# Patient Record
Sex: MALE | Race: OTHER | HISPANIC OR LATINO | URBAN - METROPOLITAN AREA
[De-identification: names, ages, dates, MRNs, and addresses within clinical notes are randomized per-mention and may not be internally consistent; named-entity substitution may affect disease eponyms.]

---

## 2023-12-12 ENCOUNTER — INPATIENT (INPATIENT)
Facility: HOSPITAL | Age: 53
LOS: 9 days | Discharge: HOME | DRG: 287 | End: 2023-12-22
Attending: HOSPITALIST | Admitting: STUDENT IN AN ORGANIZED HEALTH CARE EDUCATION/TRAINING PROGRAM
Payer: MEDICAID

## 2023-12-12 VITALS
OXYGEN SATURATION: 98 % | WEIGHT: 175.27 LBS | HEIGHT: 60 IN | RESPIRATION RATE: 18 BRPM | HEART RATE: 106 BPM | SYSTOLIC BLOOD PRESSURE: 128 MMHG | DIASTOLIC BLOOD PRESSURE: 80 MMHG | TEMPERATURE: 98 F

## 2023-12-12 DIAGNOSIS — I50.9 HEART FAILURE, UNSPECIFIED: ICD-10-CM

## 2023-12-12 LAB
ALBUMIN SERPL ELPH-MCNC: 3.7 G/DL — SIGNIFICANT CHANGE UP (ref 3.3–5.2)
ALBUMIN SERPL ELPH-MCNC: 3.7 G/DL — SIGNIFICANT CHANGE UP (ref 3.3–5.2)
ALP SERPL-CCNC: 106 U/L — SIGNIFICANT CHANGE UP (ref 40–120)
ALP SERPL-CCNC: 106 U/L — SIGNIFICANT CHANGE UP (ref 40–120)
ALT FLD-CCNC: 21 U/L — SIGNIFICANT CHANGE UP
ALT FLD-CCNC: 21 U/L — SIGNIFICANT CHANGE UP
ANION GAP SERPL CALC-SCNC: 13 MMOL/L — SIGNIFICANT CHANGE UP (ref 5–17)
ANION GAP SERPL CALC-SCNC: 13 MMOL/L — SIGNIFICANT CHANGE UP (ref 5–17)
AST SERPL-CCNC: 27 U/L — SIGNIFICANT CHANGE UP
AST SERPL-CCNC: 27 U/L — SIGNIFICANT CHANGE UP
BASOPHILS # BLD AUTO: 0.04 K/UL — SIGNIFICANT CHANGE UP (ref 0–0.2)
BASOPHILS # BLD AUTO: 0.04 K/UL — SIGNIFICANT CHANGE UP (ref 0–0.2)
BASOPHILS NFR BLD AUTO: 0.8 % — SIGNIFICANT CHANGE UP (ref 0–2)
BASOPHILS NFR BLD AUTO: 0.8 % — SIGNIFICANT CHANGE UP (ref 0–2)
BILIRUB SERPL-MCNC: 0.6 MG/DL — SIGNIFICANT CHANGE UP (ref 0.4–2)
BILIRUB SERPL-MCNC: 0.6 MG/DL — SIGNIFICANT CHANGE UP (ref 0.4–2)
BUN SERPL-MCNC: 14.2 MG/DL — SIGNIFICANT CHANGE UP (ref 8–20)
BUN SERPL-MCNC: 14.2 MG/DL — SIGNIFICANT CHANGE UP (ref 8–20)
CALCIUM SERPL-MCNC: 8.5 MG/DL — SIGNIFICANT CHANGE UP (ref 8.4–10.5)
CALCIUM SERPL-MCNC: 8.5 MG/DL — SIGNIFICANT CHANGE UP (ref 8.4–10.5)
CHLORIDE SERPL-SCNC: 103 MMOL/L — SIGNIFICANT CHANGE UP (ref 96–108)
CHLORIDE SERPL-SCNC: 103 MMOL/L — SIGNIFICANT CHANGE UP (ref 96–108)
CO2 SERPL-SCNC: 24 MMOL/L — SIGNIFICANT CHANGE UP (ref 22–29)
CO2 SERPL-SCNC: 24 MMOL/L — SIGNIFICANT CHANGE UP (ref 22–29)
CREAT SERPL-MCNC: 0.89 MG/DL — SIGNIFICANT CHANGE UP (ref 0.5–1.3)
CREAT SERPL-MCNC: 0.89 MG/DL — SIGNIFICANT CHANGE UP (ref 0.5–1.3)
EGFR: 102 ML/MIN/1.73M2 — SIGNIFICANT CHANGE UP
EGFR: 102 ML/MIN/1.73M2 — SIGNIFICANT CHANGE UP
EOSINOPHIL # BLD AUTO: 0.04 K/UL — SIGNIFICANT CHANGE UP (ref 0–0.5)
EOSINOPHIL # BLD AUTO: 0.04 K/UL — SIGNIFICANT CHANGE UP (ref 0–0.5)
EOSINOPHIL NFR BLD AUTO: 0.8 % — SIGNIFICANT CHANGE UP (ref 0–6)
EOSINOPHIL NFR BLD AUTO: 0.8 % — SIGNIFICANT CHANGE UP (ref 0–6)
GLUCOSE SERPL-MCNC: 115 MG/DL — HIGH (ref 70–99)
GLUCOSE SERPL-MCNC: 115 MG/DL — HIGH (ref 70–99)
HCT VFR BLD CALC: 42 % — SIGNIFICANT CHANGE UP (ref 39–50)
HCT VFR BLD CALC: 42 % — SIGNIFICANT CHANGE UP (ref 39–50)
HGB BLD-MCNC: 13.9 G/DL — SIGNIFICANT CHANGE UP (ref 13–17)
HGB BLD-MCNC: 13.9 G/DL — SIGNIFICANT CHANGE UP (ref 13–17)
IMM GRANULOCYTES NFR BLD AUTO: 0.2 % — SIGNIFICANT CHANGE UP (ref 0–0.9)
IMM GRANULOCYTES NFR BLD AUTO: 0.2 % — SIGNIFICANT CHANGE UP (ref 0–0.9)
LYMPHOCYTES # BLD AUTO: 0.98 K/UL — LOW (ref 1–3.3)
LYMPHOCYTES # BLD AUTO: 0.98 K/UL — LOW (ref 1–3.3)
LYMPHOCYTES # BLD AUTO: 18.4 % — SIGNIFICANT CHANGE UP (ref 13–44)
LYMPHOCYTES # BLD AUTO: 18.4 % — SIGNIFICANT CHANGE UP (ref 13–44)
MAGNESIUM SERPL-MCNC: 1.8 MG/DL — SIGNIFICANT CHANGE UP (ref 1.6–2.6)
MAGNESIUM SERPL-MCNC: 1.8 MG/DL — SIGNIFICANT CHANGE UP (ref 1.6–2.6)
MCHC RBC-ENTMCNC: 31.2 PG — SIGNIFICANT CHANGE UP (ref 27–34)
MCHC RBC-ENTMCNC: 31.2 PG — SIGNIFICANT CHANGE UP (ref 27–34)
MCHC RBC-ENTMCNC: 33.1 GM/DL — SIGNIFICANT CHANGE UP (ref 32–36)
MCHC RBC-ENTMCNC: 33.1 GM/DL — SIGNIFICANT CHANGE UP (ref 32–36)
MCV RBC AUTO: 94.4 FL — SIGNIFICANT CHANGE UP (ref 80–100)
MCV RBC AUTO: 94.4 FL — SIGNIFICANT CHANGE UP (ref 80–100)
MONOCYTES # BLD AUTO: 0.51 K/UL — SIGNIFICANT CHANGE UP (ref 0–0.9)
MONOCYTES # BLD AUTO: 0.51 K/UL — SIGNIFICANT CHANGE UP (ref 0–0.9)
MONOCYTES NFR BLD AUTO: 9.6 % — SIGNIFICANT CHANGE UP (ref 2–14)
MONOCYTES NFR BLD AUTO: 9.6 % — SIGNIFICANT CHANGE UP (ref 2–14)
NEUTROPHILS # BLD AUTO: 3.75 K/UL — SIGNIFICANT CHANGE UP (ref 1.8–7.4)
NEUTROPHILS # BLD AUTO: 3.75 K/UL — SIGNIFICANT CHANGE UP (ref 1.8–7.4)
NEUTROPHILS NFR BLD AUTO: 70.2 % — SIGNIFICANT CHANGE UP (ref 43–77)
NEUTROPHILS NFR BLD AUTO: 70.2 % — SIGNIFICANT CHANGE UP (ref 43–77)
NT-PROBNP SERPL-SCNC: 7690 PG/ML — HIGH (ref 0–300)
NT-PROBNP SERPL-SCNC: 7690 PG/ML — HIGH (ref 0–300)
PLATELET # BLD AUTO: 181 K/UL — SIGNIFICANT CHANGE UP (ref 150–400)
PLATELET # BLD AUTO: 181 K/UL — SIGNIFICANT CHANGE UP (ref 150–400)
POTASSIUM SERPL-MCNC: 4.8 MMOL/L — SIGNIFICANT CHANGE UP (ref 3.5–5.3)
POTASSIUM SERPL-MCNC: 4.8 MMOL/L — SIGNIFICANT CHANGE UP (ref 3.5–5.3)
POTASSIUM SERPL-SCNC: 4.8 MMOL/L — SIGNIFICANT CHANGE UP (ref 3.5–5.3)
POTASSIUM SERPL-SCNC: 4.8 MMOL/L — SIGNIFICANT CHANGE UP (ref 3.5–5.3)
PROT SERPL-MCNC: 6.2 G/DL — LOW (ref 6.6–8.7)
PROT SERPL-MCNC: 6.2 G/DL — LOW (ref 6.6–8.7)
RAPID RVP RESULT: DETECTED
RAPID RVP RESULT: DETECTED
RBC # BLD: 4.45 M/UL — SIGNIFICANT CHANGE UP (ref 4.2–5.8)
RBC # BLD: 4.45 M/UL — SIGNIFICANT CHANGE UP (ref 4.2–5.8)
RBC # FLD: 13.3 % — SIGNIFICANT CHANGE UP (ref 10.3–14.5)
RBC # FLD: 13.3 % — SIGNIFICANT CHANGE UP (ref 10.3–14.5)
RSV RNA SPEC QL NAA+PROBE: DETECTED
RSV RNA SPEC QL NAA+PROBE: DETECTED
SARS-COV-2 RNA SPEC QL NAA+PROBE: SIGNIFICANT CHANGE UP
SARS-COV-2 RNA SPEC QL NAA+PROBE: SIGNIFICANT CHANGE UP
SODIUM SERPL-SCNC: 139 MMOL/L — SIGNIFICANT CHANGE UP (ref 135–145)
SODIUM SERPL-SCNC: 139 MMOL/L — SIGNIFICANT CHANGE UP (ref 135–145)
TROPONIN T, HIGH SENSITIVITY RESULT: 37 NG/L — SIGNIFICANT CHANGE UP (ref 0–51)
TROPONIN T, HIGH SENSITIVITY RESULT: 37 NG/L — SIGNIFICANT CHANGE UP (ref 0–51)
TROPONIN T, HIGH SENSITIVITY RESULT: 38 NG/L — SIGNIFICANT CHANGE UP (ref 0–51)
TROPONIN T, HIGH SENSITIVITY RESULT: 38 NG/L — SIGNIFICANT CHANGE UP (ref 0–51)
WBC # BLD: 5.33 K/UL — SIGNIFICANT CHANGE UP (ref 3.8–10.5)
WBC # BLD: 5.33 K/UL — SIGNIFICANT CHANGE UP (ref 3.8–10.5)
WBC # FLD AUTO: 5.33 K/UL — SIGNIFICANT CHANGE UP (ref 3.8–10.5)
WBC # FLD AUTO: 5.33 K/UL — SIGNIFICANT CHANGE UP (ref 3.8–10.5)

## 2023-12-12 PROCEDURE — 99285 EMERGENCY DEPT VISIT HI MDM: CPT

## 2023-12-12 PROCEDURE — 99223 1ST HOSP IP/OBS HIGH 75: CPT

## 2023-12-12 PROCEDURE — 93010 ELECTROCARDIOGRAM REPORT: CPT

## 2023-12-12 PROCEDURE — 71045 X-RAY EXAM CHEST 1 VIEW: CPT | Mod: 26

## 2023-12-12 RX ORDER — FOLIC ACID 0.8 MG
1 TABLET ORAL DAILY
Refills: 0 | Status: DISCONTINUED | OUTPATIENT
Start: 2023-12-12 | End: 2023-12-18

## 2023-12-12 RX ORDER — LANOLIN ALCOHOL/MO/W.PET/CERES
3 CREAM (GRAM) TOPICAL AT BEDTIME
Refills: 0 | Status: DISCONTINUED | OUTPATIENT
Start: 2023-12-12 | End: 2023-12-22

## 2023-12-12 RX ORDER — FUROSEMIDE 40 MG
40 TABLET ORAL ONCE
Refills: 0 | Status: COMPLETED | OUTPATIENT
Start: 2023-12-12 | End: 2023-12-12

## 2023-12-12 RX ORDER — ENOXAPARIN SODIUM 100 MG/ML
40 INJECTION SUBCUTANEOUS EVERY 12 HOURS
Refills: 0 | Status: DISCONTINUED | OUTPATIENT
Start: 2023-12-12 | End: 2023-12-22

## 2023-12-12 RX ORDER — THIAMINE MONONITRATE (VIT B1) 100 MG
100 TABLET ORAL ONCE
Refills: 0 | Status: COMPLETED | OUTPATIENT
Start: 2023-12-12 | End: 2023-12-13

## 2023-12-12 RX ORDER — ACETAMINOPHEN 500 MG
650 TABLET ORAL EVERY 6 HOURS
Refills: 0 | Status: DISCONTINUED | OUTPATIENT
Start: 2023-12-12 | End: 2023-12-22

## 2023-12-12 RX ORDER — ONDANSETRON 8 MG/1
4 TABLET, FILM COATED ORAL EVERY 8 HOURS
Refills: 0 | Status: DISCONTINUED | OUTPATIENT
Start: 2023-12-12 | End: 2023-12-22

## 2023-12-12 RX ADMIN — Medication 40 MILLIGRAM(S): at 19:23

## 2023-12-12 NOTE — H&P ADULT - ATTENDING COMMENTS
53/M, alcohol use disorder (2-3 rum shots)/ night, last drink 12/10, no other significant pmhx presented to the ED for complaints of dyspnea on exertion which began approximately 1 week ago. BNP elevated, but no JVD, no b/l LE edema, no crackles noted on exam. RSV positive. Admitted for exertional dyspnea 2/2 to RSV versus new onset CHF. ECHO ordered, telemetry monitoring. Pt also chronic ETOH user. Started on CIWA with taper and symptomatic benzo administration.

## 2023-12-12 NOTE — ED ADULT NURSE NOTE - OBJECTIVE STATEMENT
Aox4. Pt presenting to Emergency Department complaining of SOB w/ excretion x 8 days. Pt reports coming to Emergency Department today due to increased SOB at work. Pt observed to be tachypneic. Pt placed on cardiac monitor in sinus tach w/ PVCs 101 HR and  99% RA. Respirations even and unlabored. Skin warm to touch.

## 2023-12-12 NOTE — ED ADULT TRIAGE NOTE - CHIEF COMPLAINT QUOTE
pt states " he has difficulty breathing", pt was at work  A&Ox3, resp wnl, pt having tremors, states he drinks on weekends but drank last night

## 2023-12-12 NOTE — ED PROVIDER NOTE - OBJECTIVE STATEMENT
52 y/o male no relevant PMH Alcohol abuse comes to ED c/o  BARCENAS started 8 days ago, he drinks about 3 rum shots every day with dinner. 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 Pt denies fever, chest pain, nausea, vomits, abdominal pain, headache, UTI symptoms or leg edema. Has dry cough for 2 days. Crackles b/l lower lung bases.

## 2023-12-12 NOTE — CONSULT NOTE ADULT - SUBJECTIVE AND OBJECTIVE BOX
[facility]                                              CARDIOLOGY AT [place] [address]                                             Telephone: [phone]. Fax:[phone]      CARDIOLOGY CONSULTATION NOTE                                                                                             History obtained by: Patient and medical record  Community Cardiologist: No   obtained:  No   Reason for Consultation: CHF    Chief complaint:   Patient is a 53y old  Male who presents with a chief complaint of BARCENAS.    HPI: 52 y/o obese male with PMHx of alcohol abuse (2 shots of tequila daily) presented to ED c/o  BARCENAS and PND x  8 days ago. 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 Pt also co cough since yesterday. Pt denies fever, chest pain, nausea, vomits, abdominal pain, headache, or UTI.     CARDIAC TESTING     PAST MEDICAL HISTORY  Alcohol abuse    PAST SURGICAL HISTORY  Denies    SOCIAL HISTORY: + 2 shots of tequila daily. + former smoker (quit 12 years ago). Denies drugs    FAMILY HISTORY:  Denies    Family History of Cardiovascular Disease:  No   Coronary Artery Disease in first degree relative: No   Sudden Cardiac Death in First degree relative: No     HOME MEDICATIONS:   None    CURRENT OTHER MEDICATIONS:   acetaminophen     Tablet .. 650 milliGRAM(s) Oral every 6 hours PRN Temp greater or equal to 38C (100.4F), Mild Pain (1 - 3)  chlordiazePOXIDE   Oral   chlordiazePOXIDE 50 milliGRAM(s) Oral every 6 hours, Stop order after: 4 Doses  LORazepam   Injectable 2 milliGRAM(s) IV Push every 1 hour PRN Symptom-triggered: each CIWA -Ar score 8 or GREATER  melatonin 3 milliGRAM(s) Oral at bedtime PRN Insomnia  ondansetron Injectable 4 milliGRAM(s) IV Push every 8 hours PRN Nausea and/or Vomiting  aluminum hydroxide/magnesium hydroxide/simethicone Suspension 30 milliLiter(s) Oral every 4 hours PRN Dyspepsia  enoxaparin Injectable 40 milliGRAM(s) SubCutaneous every 12 hours  folic acid 1 milliGRAM(s) Oral daily  multivitamin 1 Tablet(s) Oral daily  thiamine Injectable 100 milliGRAM(s) IntraMuscular once, Stop order after: 1 Doses    ALLERGIES:   No Known Allergies    REVIEW OF SYMPTOMS:   CONSTITUTIONAL: No fever, no chills, no weight loss, no weight gain, no fatigue   ENMT:  No vertigo; No sinus or throat pain  NECK: No pain or stiffness  CARDIOVASCULAR: No chest pain, + BARCENAS, no syncope/presyncope, no fatigue, no palpitations, no dizziness, no Orthopnea, + Paroxsymal nocturnal dyspnea  RESPIRATORY: + BARCENAS, + cough  : No dysuria, no hematuria   GI: no nausea, no diarrhea, no constipation, no abdominal pain  NEURO: No headache, no slurred speech   MUSCULOSKELETAL: No joint pain or swelling; No muscle, back, or extremity pain  PSYCH: No agitation, no anxiety.    ALL OTHER REVIEW OF SYSTEMS ARE NEGATIVE.    VITAL SIGNS:   T(C): 36.6 (12-12-23 @ 14:13), Max: 36.6 (12-12-23 @ 14:13)  T(F): 97.9 (12-12-23 @ 14:13), Max: 97.9 (12-12-23 @ 14:13)  HR: 106 (12-12-23 @ 14:13) (106 - 106)  BP: 128/80 (12-12-23 @ 14:13) (128/80 - 128/80)  RR: 18 (12-12-23 @ 14:13) (18 - 18)  SpO2: 98% (12-12-23 @ 14:13) (98% - 98%)    PHYSICAL EXAM:   Constitutional: Comfortable . No acute distress.   HEENT: Atraumatic and normocephalic , neck is supple . no JVD.   CNS: A&Ox3. No focal deficits.   Respiratory:  unlabored. + wheezing b/l, No rhonchi. No crackles   Cardiovascular: RRR normal s1 s2. No murmur. No rubs or gallop.  Gastrointestinal: Soft, non-tender. +Bowel sounds.   Extremities: 2+ Peripheral Pulses, + 1 pitting edema sp Lasix x 1  Psychiatric: Calm . no agitation.   Skin: Warm and dry    LABS:                        13.9   5.33  )-----------( 181      ( 12 Dec 2023 17:19 )             42.0     12-12    139  |  103  |  14.2  ----------------------------<  115<H>  4.8   |  24.0  |  0.89    Ca    8.5      12 Dec 2023 17:19  Mg     1.8     12-12    TPro  6.2<L>  /  Alb  3.7  /  TBili  0.6  /  DBili  x   /  AST  27  /  ALT  21  /  AlkPhos  106  12-12      ECG: Sinus tachycardia with PVCs. RBBB. EKG poor quality. New stat EKG ordered   Prior ECG: No     RADIOLOGY & ADDITIONAL STUDIES:       Preliminary evaluation, please await official recommendations     Assessment and recommendations are final when note is signed by the attending.                                      [facility]                                              CARDIOLOGY AT [place] [address]                                             Telephone: [phone]. Fax:[phone]      CARDIOLOGY CONSULTATION NOTE                                                                                             History obtained by: Patient and medical record  Community Cardiologist: No   obtained:  No   Reason for Consultation: CHF    Chief complaint:   Patient is a 53y old  Male who presents with a chief complaint of BARCENAS.    HPI: 54 y/o obese male with PMHx of alcohol abuse (2 shots of tequila daily) presented to ED c/o  BARCENAS and PND x  8 days ago. 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 Pt also co cough since yesterday. Pt denies fever, chest pain, nausea, vomits, abdominal pain, headache, or UTI.     CARDIAC TESTING     PAST MEDICAL HISTORY  Alcohol abuse    PAST SURGICAL HISTORY  Denies    SOCIAL HISTORY: + 2 shots of tequila daily. + former smoker (quit 12 years ago). Denies drugs    FAMILY HISTORY:  Denies    Family History of Cardiovascular Disease:  No   Coronary Artery Disease in first degree relative: No   Sudden Cardiac Death in First degree relative: No     HOME MEDICATIONS:   None    CURRENT OTHER MEDICATIONS:   acetaminophen     Tablet .. 650 milliGRAM(s) Oral every 6 hours PRN Temp greater or equal to 38C (100.4F), Mild Pain (1 - 3)  chlordiazePOXIDE   Oral   chlordiazePOXIDE 50 milliGRAM(s) Oral every 6 hours, Stop order after: 4 Doses  LORazepam   Injectable 2 milliGRAM(s) IV Push every 1 hour PRN Symptom-triggered: each CIWA -Ar score 8 or GREATER  melatonin 3 milliGRAM(s) Oral at bedtime PRN Insomnia  ondansetron Injectable 4 milliGRAM(s) IV Push every 8 hours PRN Nausea and/or Vomiting  aluminum hydroxide/magnesium hydroxide/simethicone Suspension 30 milliLiter(s) Oral every 4 hours PRN Dyspepsia  enoxaparin Injectable 40 milliGRAM(s) SubCutaneous every 12 hours  folic acid 1 milliGRAM(s) Oral daily  multivitamin 1 Tablet(s) Oral daily  thiamine Injectable 100 milliGRAM(s) IntraMuscular once, Stop order after: 1 Doses    ALLERGIES:   No Known Allergies    REVIEW OF SYMPTOMS:   CONSTITUTIONAL: No fever, no chills, no weight loss, no weight gain, no fatigue   ENMT:  No vertigo; No sinus or throat pain  NECK: No pain or stiffness  CARDIOVASCULAR: No chest pain, + BARCENAS, no syncope/presyncope, no fatigue, no palpitations, no dizziness, no Orthopnea, + Paroxsymal nocturnal dyspnea  RESPIRATORY: + BARCENAS, + cough  : No dysuria, no hematuria   GI: no nausea, no diarrhea, no constipation, no abdominal pain  NEURO: No headache, no slurred speech   MUSCULOSKELETAL: No joint pain or swelling; No muscle, back, or extremity pain  PSYCH: No agitation, no anxiety.    ALL OTHER REVIEW OF SYSTEMS ARE NEGATIVE.    VITAL SIGNS:   T(C): 36.6 (12-12-23 @ 14:13), Max: 36.6 (12-12-23 @ 14:13)  T(F): 97.9 (12-12-23 @ 14:13), Max: 97.9 (12-12-23 @ 14:13)  HR: 106 (12-12-23 @ 14:13) (106 - 106)  BP: 128/80 (12-12-23 @ 14:13) (128/80 - 128/80)  RR: 18 (12-12-23 @ 14:13) (18 - 18)  SpO2: 98% (12-12-23 @ 14:13) (98% - 98%)    PHYSICAL EXAM:   Constitutional: Comfortable . No acute distress.   HEENT: Atraumatic and normocephalic , neck is supple . no JVD.   CNS: A&Ox3. No focal deficits.   Respiratory:  unlabored. + wheezing b/l, No rhonchi. No crackles   Cardiovascular: RRR normal s1 s2. No murmur. No rubs or gallop.  Gastrointestinal: Soft, non-tender. +Bowel sounds.   Extremities: 2+ Peripheral Pulses, + 1 pitting edema sp Lasix x 1  Psychiatric: Calm . no agitation.   Skin: Warm and dry    LABS:                        13.9   5.33  )-----------( 181      ( 12 Dec 2023 17:19 )             42.0     12-12    139  |  103  |  14.2  ----------------------------<  115<H>  4.8   |  24.0  |  0.89    Ca    8.5      12 Dec 2023 17:19  Mg     1.8     12-12    TPro  6.2<L>  /  Alb  3.7  /  TBili  0.6  /  DBili  x   /  AST  27  /  ALT  21  /  AlkPhos  106  12-12      ECG: Sinus tachycardia with PVCs. RBBB. EKG poor quality. New stat EKG ordered   Prior ECG: No     RADIOLOGY & ADDITIONAL STUDIES:       Preliminary evaluation, please await official recommendations     Assessment and recommendations are final when note is signed by the attending.

## 2023-12-12 NOTE — H&P ADULT - NSHPPHYSICALEXAM_GEN_ALL_CORE
T(C): 36.9 (12-12-23 @ 23:52), Max: 36.9 (12-12-23 @ 23:52)  HR: 86 (12-12-23 @ 23:52) (86 - 106)  BP: 119/84 (12-12-23 @ 23:52) (119/84 - 128/80)  RR: 18 (12-12-23 @ 23:52) (18 - 18)  SpO2: 94% (12-12-23 @ 23:52) (94% - 98%)    CONSTITUTIONAL: No apparent distress  EYES: PERRLA and symmetric,  No conjunctival or scleral injection, non-icteric  ENMT: Oral mucosa with moist membranes. Normal dentition; no pharyngeal injection or exudates  NECK: Supple, symmetric and without tracheal deviation   RESP: No respiratory distress, no use of accessory muscles; CTA b/l rhonchi  CV: RRR, +S1S2, no JVD; no peripheral edema  GI: Soft, NT, ND, no rebound, no guarding; no palpable masses  MSK: Normal gait  SKIN: Healed scar over L. thigh  NEURO: CN II-XII intact; normal power, sensation intact in upper and lower extremities b/l to light touch   PSYCH: Appropriate insight/judgment; A+O x 3, mood and affect appropriate, recent/remote memory intact

## 2023-12-12 NOTE — H&P ADULT - HISTORY OF PRESENT ILLNESS
53/M, alcohol use disorder 53/M, alcohol use disorder (2-3 rum shots)/ night, last drink 12/10, no other significant pmhx presented to the ED for complaints of dyspnea on exertion which began approximately 1 week ago. Reports that he started noticing some dyspnea around a week back which progressively worsened in the past 2-3 days and was associated with cough. Reports some vague pain in the LUQ below his ribs without any hx of inciting injury or relation to food intake which began appx the same time. Pain is sporadic and is apparently better today. On direct questioning denies orthopnea, chest pain, fever or chills, leg swelling or dizziness.  53/M, alcohol use disorder (2-3 rum shots)/ night, last drink 12/10, no other significant pmhx presented to the ED for complaints of dyspnea on exertion which began approximately 1 week ago. Reports that he started noticing some dyspnea around a week back which progressively worsened in the past 2-3 days and was associated with cough. Reports some vague pain in the RUQ below his ribs without any hx of inciting injury or relation to food intake which began appx the same time. Pain is sporadic and is apparently better today. On direct questioning denies orthopnea, chest pain, fever or chills, leg swelling or dizziness.

## 2023-12-12 NOTE — ED PROVIDER NOTE - PROGRESS NOTE DETAILS
BNP increased, started pt on Lasix 40mg -> admit for new onset CHF, cardiology consult pending, Echo pending.

## 2023-12-12 NOTE — ED PROVIDER NOTE - NS ED ROS FT
Gen: No fever,tired  ENT: No congestion, no rhinorrhea  Resp: dry cough, SOB  Cardiovascular: No chest pain, no palpitation. + BARCENAS  Gastrointestinal: No nausea, no vomiting, no diarrhea  :  No change in urine output; no dysuria, no hematuria  MS: No joint or muscle pain  Skin: No rashes  Neuro: No headache; no abnormal movements  Remainder negative, except as per the HPI

## 2023-12-12 NOTE — ED PROVIDER NOTE - CLINICAL SUMMARY MEDICAL DECISION MAKING FREE TEXT BOX
54 y/o male no relevant PMH Alcohol abuse comes to ED c/o  BARCENAS started 8 days ago, he drinks about 3 rum shots every day with dinner. 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 Pt denies fever, chest pain, nausea, vomits, abdominal pain, headache, UTI symptoms or leg edema. Has dry cough for 2 days.     Crackles b/l lower lung bases.     A/P   1. LAbs  2. Echo  3. Trop + EKG  4, Xray  5. Cardiology consult and tx pending results 52 y/o male no relevant PMH Alcohol abuse comes to ED c/o  BARCENAS started 8 days ago, he drinks about 3 rum shots every day with dinner. 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 Pt denies fever, chest pain, nausea, vomits, abdominal pain, headache, UTI symptoms or leg edema. Has dry cough for 2 days.     Crackles b/l lower lung bases.     A/P   1. LAbs -> elevated bnp -> lasix  2. Echo  3. Trop + EKG  4, Xray  5. Cardiology consult and tx pending results    Dispo admit 54 y/o male no relevant PMH Alcohol abuse comes to ED c/o  BARCENAS started 8 days ago, he drinks about 3 rum shots every day with dinner. 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 Pt denies fever, chest pain, nausea, vomits, abdominal pain, headache, UTI symptoms or leg edema. Has dry cough for 2 days.     Crackles b/l lower lung bases.     A/P   1. LAbs -> elevated bnp -> lasix  2. Echo  3. Trop + EKG  4, Xray  5. Cardiology consult and tx pending results    Dispo admit

## 2023-12-12 NOTE — CONSULT NOTE ADULT - PROBLEM SELECTOR RECOMMENDATION 9
Pt obese (BMI 47) with PMHx of alcohol abuse (2 shots of tequila daily) presented to ED c/o  BARCENAS and PND x  8 days ago.   Pt volume overload on exam with elevated p-BNP  (7690)  ECG: Sinus tachycardia with PVCs. RBBB. EKG poor quality. New stat EKG ordered. No prior EKG to compare   Trop x 2 negative (38-37)  Telemetry  Trend p-BNP   Start Lasix 40 mg IVP BID. Monitor kidney fx  O2 NC PRN   Maintain K+>4 and mag >2  Trend trops x 3. Serial EKGs  Obtain A1C, lipid panel fasting, TFTs, sed rate to ro comorbidities   DASH diet  Daily weight monitor. Strict I&Os Monitor  Obtain TTE in AM to assess functional and structural status  Elevate swollen leg  Venous Doppler lower extremities r/o DVT  Avoid alcohol   Encourage daily exercise as tolerated and optimal weight management    Further recommendations base on above findings New onset CHF  Pt obese (BMI 47) with PMHx of alcohol abuse (2 shots of tequila daily) presented to ED c/o  BARCENAS and PND x 8 days.  Pt volume overload on exam with elevated p-BNP  (7690)  ECG: Sinus tachycardia with PVCs. RBBB. EKG poor quality. New stat EKG ordered. No prior EKG to compare   Trop x 2 negative (38-37)  Telemetry  Trend p-BNP   Start Lasix 40 mg IVP BID. Monitor kidney fx  O2 NC PRN   Maintain K+>4 and mag >2  Trend trops x 3. Serial EKGs  Obtain A1C, lipid panel fasting, TFTs, sed rate to ro comorbidities   DASH diet. Daily weight monitor. Strict I&Os Monitor  Obtain TTE in AM to assess functional and structural status  Elevate swollen leg  Venous Doppler lower extremities r/o DVT  Avoid alcohol   Encourage daily exercise as tolerated and optimal weight management    Further recommendations base on above findings New onset CHF  Pt obese (BMI 47) with PMHx of alcohol abuse (2 shots of tequila daily) presented to ED c/o  BARCENAS and PND x 8 days.  Pt volume overload on exam with elevated p-BNP  (7690)  ECG: Sinus tachycardia with PVCs. RBBB. EKG poor quality. New stat EKG ordered. No prior EKG to compare   Trop x 2 negative (38-37)  Telemetry  Trend p-BNP   Start Lasix 40 mg IVP BID. Monitor kidney fx  O2 NC PRN   Maintain K+>4 and mag >2  Trend trops x 3. Serial EKGs  Obtain A1C, lipid panel fasting, TFTs, sed rate to ro comorbidities   DASH diet. Daily weight monitor. Strict I&Os Monitor  Obtain TTE in AM to assess functional and structural status  Elevate swollen leg  Venous Doppler lower extremities r/o DVT  Avoid alcohol   Encourage daily exercise as tolerated and optimal weight management    Further recommendations base on above findings. Case discussed with Dr Díaz

## 2023-12-12 NOTE — H&P ADULT - NSHPLABSRESULTS_GEN_ALL_CORE
Troponin T, High Sensitivity (12.12.23 @ 19:22) Respiratory Viral Panel with COVID-19 by BILL (12.12.23 @ 19:22)   Rapid RVP Result: Detected  SARS-CoV-2: NotDetec: This Respiratory Panel uses polymerase chain reaction (PCR) to detect for   adenovirus; coronavirus (HKU1, NL63, 229E, OC43); human metapneumovirus   (hMPV); human enterovirus/rhinovirus (Entero/RV); influenza A; influenza   A/H1; influenza A/H3; influenza A/H1-2009; influenza B; parainfluenza   viruses 1, 2, 3, 4; respiratory syncytial virus; Mycoplasma pneumoniae;   Chlamydophila pneumoniae; and SARS-CoV-2.  Resp Syncytial Virus (RapRVP): DetectedPro-Brain Natriuretic Peptide (12.12.23 @ 17:19)   Pro-Brain Natriuretic Peptide: 7690: NT-proBNP Interpretive comments: Complete Blood Count + Automated Diff (12.12.23 @ 17:19)   WBC Count: 5.33 K/uL  RBC Count: 4.45 M/uL  Hemoglobin: 13.9 g/dL  Hematocrit: 42.0 %  Mean Cell Volume: 94.4 fl  Mean Cell Hemoglobin: 31.2 pg  Mean Cell Hemoglobin Conc: 33.1 gm/dL  Red Cell Distrib Width: 13.3 %  Platelet Count - Automated: 181 K/uL  Comprehensive Metabolic Panel (12.12.23 @ 17:19)   Sodium: 139 mmol/L  Potassium: 4.8 mmol/L  Chloride: 103: Chloride reference range changed from ..10/26/2022 mmol/L  Carbon Dioxide: 24.0 mmol/L  Anion Gap: 13 mmol/L  Blood Urea Nitrogen: 14.2 mg/dL  Creatinine: 0.89 mg/dL  Glucose: 115 mg/dL  Calcium: 8.5 mg/dL  Protein Total: 6.2 g/dL  Albumin: 3.7 g/dL  Bilirubin Total: 0.6 mg/dL  Alkaline Phosphatase: 106 U/L  Aspartate Aminotransferase (AST/SGOT): 27 U/L  Alanine Aminotransferase (ALT/SGPT): 21 U/L

## 2023-12-12 NOTE — H&P ADULT - ASSESSMENT
53/M, alcohol use disorder (2-3 rum shots)/ night, last drink 12/10, no other significant pmhx presented to the ED for complaints of dyspnea on exertion which began approximately 1 week ago. RVP- RSV +    Acute Onset Dyspnea on Exertion  - No respiratory distress, saturating well on RA   - Viral LRTI vs Acute onset CHF   - RSV +ve on RVP  - Pro BNP in 7000's, no orthopnea, pedal edema or other clinical features   - S/p 40mg IVP Lasix in ED   - Continue  - Cxray with some questionable congestion, final read awaited  - CBC, CMP, Trop T WNL   - TTE ordered  - Cardiology consulted     Alcohol use disorder  - Last day 12/10  - CIWA protocol initiated   - On Librium taper  - Resources offered for detox    LUQ pain  - Sporadic in nature  - CMP WNL   - USG LUQ     VTE prophylaxis- Lovenox  Diet- Regular     53/M, alcohol use disorder (2-3 rum shots)/ night, last drink 12/10, no other significant pmhx presented to the ED for complaints of dyspnea on exertion which began approximately 1 week ago. RVP- RSV +    Acute Onset Dyspnea on Exertion  - No respiratory distress, saturating well on RA   - Viral LRTI vs Acute onset CHF   - RSV +ve on RVP  - Pro BNP in 7000's, no orthopnea, pedal edema or other clinical features   - S/p 40mg IVP Lasix in ED   - Continue  - Cxray with some questionable congestion, final read awaited  - CBC, CMP, Trop T WNL   - TTE ordered  - Cardiology consulted     Alcohol use disorder  - Last day 12/10  - CIWA protocol initiated   - On Librium taper  - IM thiamine 100mg x1  - Folic acid, MVI   - Resources offered for detox    LUQ pain  - Sporadic in nature  - CMP WNL   - USG LUQ     VTE prophylaxis- Lovenox  Diet- Regular     53/M, alcohol use disorder (2-3 rum shots)/ night, last drink 12/10, no other significant pmhx presented to the ED for complaints of dyspnea on exertion which began approximately 1 week ago. RVP- RSV +    Acute Onset Dyspnea on Exertion  - No respiratory distress, saturating well on RA   - Viral LRTI vs Acute onset CHF   - RSV +ve on RVP  - Pro BNP in 7000's, no orthopnea, pedal edema or other clinical features    - S/p 40mg IVP Lasix x1 in ED   - Therapy with Lasix held as pt appears to be euvolemic  - Cxray with some questionable congestion, final read awaited  - CBC, CMP, Trop T WNL   - TTE ordered  - Cardiology consulted   - Follow AM labs    Alcohol use disorder  - Last day of alcohol use 12/10  - CIWA protocol initiated   - On Librium taper, with Ativan PRN  - IM thiamine 100mg x1  - IV Thiamine 100mg q24x 3 days ordered  - Folic acid, MVI   - Resources offered for detox    RUQ pain  - Sporadic in nature  - CMP WNL   - USG RUQ to r/o fatty liver given alcohol use and overweight status    VTE prophylaxis- Lovenox  Diet- Regular

## 2023-12-12 NOTE — ED PROVIDER NOTE - ATTENDING CONTRIBUTION TO CARE
I personally saw the patient with the resident, and completed the key components of the history and physical exam. I then discussed the management plan with the resident.    52 y/o M with no PMH, but daily EtOH use presents for BARCENAS x 8 days. Denies chest pain, nausea, vomiting.    PE - mild respiratory distress, tachypneic, bibasilar rales, RRR, abd soft NT/ND, 2+ LE edema, 2+ symmetrical pulses    Patient with new onset CHF - will diurese, labs, CXR, cardiology consultation, requires admission.

## 2023-12-12 NOTE — H&P ADULT - NSHPREVIEWOFSYSTEMS_GEN_ALL_CORE
REVIEW OF SYSTEMS  CONSTITUTIONAL: No weakness  EYES/ENT: No visual changes;  No throat pain   NECK: No pain or stiffness  RESPIRATORY: +Cough, wheezing; +Shortness of breath  CARDIOVASCULAR: No chest pain or palpitations  GASTROINTESTINAL: +LUQ abdominal  pain. No nausea, vomiting, or hematemesis, diarrhea or constipation  GENITOURINARY: No dysuria, frequency or hematuria  NEUROLOGICAL: No stroke like symptoms  SKIN: No rashes REVIEW OF SYSTEMS  CONSTITUTIONAL: No weakness  EYES/ENT: No visual changes;  No throat pain   NECK: No pain or stiffness  RESPIRATORY: +Cough, wheezing; +Shortness of breath  CARDIOVASCULAR: No chest pain or palpitations  GASTROINTESTINAL: +RUQ abdominal  pain. No nausea, vomiting, or hematemesis, diarrhea or constipation  GENITOURINARY: No dysuria, frequency or hematuria  NEUROLOGICAL: No stroke like symptoms  SKIN: No rashes

## 2023-12-12 NOTE — CONSULT NOTE ADULT - NS ATTEND AMEND GEN_ALL_CORE FT
patient seen and examined on 12/13/23, [place]  [id number]    53M Azeri-speaking history significant for chronic alcohol abuse >30yrs, last drank 2 days ago, works as , only family is a sister here, presents with worsening dyspnea/orthopnea for few days, lab work significant for pBNP 7600s and +RVP, EKG with RBBB + LAFB, Troponin negative, admitted 12/12/23 for ADHF with IV Lasix 40mg BID and on CIWA protocol for alcohol withdrawal  -patient AAOx3 conversive with  phone, exam warm, SBP 90s, no strict I/O recorded, POCUS Echo done by myself with dilated cardiomyopathy with LV EF 10-15% with RV dysfunction, no pericardial effusion but R-pleural effusion noted   -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 tomorrow, but chronic alcohol abuse, lack of social support and health insurance status limiting advanced therapies options  -plan for R/LHC on Friday if no acute alcohol withdrawal  -need workup to exclude cirrhosis, abdominal US pending         Dallas Díaz DO, [facility]  Faculty Non-Invasive Cardiologist  [phone] patient seen and examined on 12/13/23, [place]  [id number]    53M Wolof-speaking history significant for chronic alcohol abuse >30yrs, last drank 2 days ago, works as , only family is a sister here, presents with worsening dyspnea/orthopnea for few days, lab work significant for pBNP 7600s and +RVP, EKG with RBBB + LAFB, Troponin negative, admitted 12/12/23 for ADHF with IV Lasix 40mg BID and on CIWA protocol for alcohol withdrawal  -patient AAOx3 conversive with  phone, exam warm, SBP 90s, no strict I/O recorded, POCUS Echo done by myself with dilated cardiomyopathy with LV EF 10-15% with RV dysfunction, no pericardial effusion but R-pleural effusion noted   -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 tomorrow, but chronic alcohol abuse, lack of social support and health insurance status limiting advanced therapies options  -plan for R/LHC on Friday if no acute alcohol withdrawal  -need workup to exclude cirrhosis, abdominal US pending         Dallas Díaz DO, [facility]  Faculty Non-Invasive Cardiologist  [phone]

## 2023-12-12 NOTE — ED PROVIDER NOTE - PHYSICAL EXAMINATION
Gen: well appearing, + acute distress  Head: normocephalic, atraumatic  EENT: EOMI, PERRLA, moist mucous membranes  Lung: + increased work of breathing, b/l lung base crackles   CV: regular rate, regular rhythm, normal s1/s2, 2+ radial pulses bilaterally  Abd: soft, non-tender, non-distended, no rebound tenderness or guarding; no CVA tenderness  MSK: No edema, no visible deformities, full range of motion in all 4 extremities  Neuro: Awake, alert, no focal neurologic deficits  Skin: No obvious rash, no jaundice  Psych: normal affect, normal speech

## 2023-12-13 DIAGNOSIS — Z98.890 OTHER SPECIFIED POSTPROCEDURAL STATES: Chronic | ICD-10-CM

## 2023-12-13 LAB
A1C WITH ESTIMATED AVERAGE GLUCOSE RESULT: 6.4 % — HIGH (ref 4–5.6)
A1C WITH ESTIMATED AVERAGE GLUCOSE RESULT: 6.4 % — HIGH (ref 4–5.6)
ALBUMIN SERPL ELPH-MCNC: 3.4 G/DL — SIGNIFICANT CHANGE UP (ref 3.3–5.2)
ALBUMIN SERPL ELPH-MCNC: 3.4 G/DL — SIGNIFICANT CHANGE UP (ref 3.3–5.2)
ALP SERPL-CCNC: 93 U/L — SIGNIFICANT CHANGE UP (ref 40–120)
ALP SERPL-CCNC: 93 U/L — SIGNIFICANT CHANGE UP (ref 40–120)
ALT FLD-CCNC: 19 U/L — SIGNIFICANT CHANGE UP
ALT FLD-CCNC: 19 U/L — SIGNIFICANT CHANGE UP
ANION GAP SERPL CALC-SCNC: 12 MMOL/L — SIGNIFICANT CHANGE UP (ref 5–17)
ANION GAP SERPL CALC-SCNC: 12 MMOL/L — SIGNIFICANT CHANGE UP (ref 5–17)
APTT BLD: 30.4 SEC — SIGNIFICANT CHANGE UP (ref 24.5–35.6)
APTT BLD: 30.4 SEC — SIGNIFICANT CHANGE UP (ref 24.5–35.6)
AST SERPL-CCNC: 23 U/L — SIGNIFICANT CHANGE UP
AST SERPL-CCNC: 23 U/L — SIGNIFICANT CHANGE UP
BASOPHILS # BLD AUTO: 0.04 K/UL — SIGNIFICANT CHANGE UP (ref 0–0.2)
BASOPHILS # BLD AUTO: 0.04 K/UL — SIGNIFICANT CHANGE UP (ref 0–0.2)
BASOPHILS NFR BLD AUTO: 0.8 % — SIGNIFICANT CHANGE UP (ref 0–2)
BASOPHILS NFR BLD AUTO: 0.8 % — SIGNIFICANT CHANGE UP (ref 0–2)
BILIRUB SERPL-MCNC: 1.2 MG/DL — SIGNIFICANT CHANGE UP (ref 0.4–2)
BILIRUB SERPL-MCNC: 1.2 MG/DL — SIGNIFICANT CHANGE UP (ref 0.4–2)
BUN SERPL-MCNC: 16 MG/DL — SIGNIFICANT CHANGE UP (ref 8–20)
BUN SERPL-MCNC: 16 MG/DL — SIGNIFICANT CHANGE UP (ref 8–20)
CALCIUM SERPL-MCNC: 8.7 MG/DL — SIGNIFICANT CHANGE UP (ref 8.4–10.5)
CALCIUM SERPL-MCNC: 8.7 MG/DL — SIGNIFICANT CHANGE UP (ref 8.4–10.5)
CHLORIDE SERPL-SCNC: 99 MMOL/L — SIGNIFICANT CHANGE UP (ref 96–108)
CHLORIDE SERPL-SCNC: 99 MMOL/L — SIGNIFICANT CHANGE UP (ref 96–108)
CHOLEST SERPL-MCNC: 89 MG/DL — SIGNIFICANT CHANGE UP
CHOLEST SERPL-MCNC: 89 MG/DL — SIGNIFICANT CHANGE UP
CO2 SERPL-SCNC: 27 MMOL/L — SIGNIFICANT CHANGE UP (ref 22–29)
CO2 SERPL-SCNC: 27 MMOL/L — SIGNIFICANT CHANGE UP (ref 22–29)
CREAT SERPL-MCNC: 0.94 MG/DL — SIGNIFICANT CHANGE UP (ref 0.5–1.3)
CREAT SERPL-MCNC: 0.94 MG/DL — SIGNIFICANT CHANGE UP (ref 0.5–1.3)
EGFR: 96 ML/MIN/1.73M2 — SIGNIFICANT CHANGE UP
EGFR: 96 ML/MIN/1.73M2 — SIGNIFICANT CHANGE UP
EOSINOPHIL # BLD AUTO: 0.03 K/UL — SIGNIFICANT CHANGE UP (ref 0–0.5)
EOSINOPHIL # BLD AUTO: 0.03 K/UL — SIGNIFICANT CHANGE UP (ref 0–0.5)
EOSINOPHIL NFR BLD AUTO: 0.6 % — SIGNIFICANT CHANGE UP (ref 0–6)
EOSINOPHIL NFR BLD AUTO: 0.6 % — SIGNIFICANT CHANGE UP (ref 0–6)
ERYTHROCYTE [SEDIMENTATION RATE] IN BLOOD: 3 MM/HR — SIGNIFICANT CHANGE UP (ref 0–15)
ERYTHROCYTE [SEDIMENTATION RATE] IN BLOOD: 3 MM/HR — SIGNIFICANT CHANGE UP (ref 0–15)
ESTIMATED AVERAGE GLUCOSE: 137 MG/DL — HIGH (ref 68–114)
ESTIMATED AVERAGE GLUCOSE: 137 MG/DL — HIGH (ref 68–114)
GLUCOSE SERPL-MCNC: 101 MG/DL — HIGH (ref 70–99)
GLUCOSE SERPL-MCNC: 101 MG/DL — HIGH (ref 70–99)
HCT VFR BLD CALC: 40.9 % — SIGNIFICANT CHANGE UP (ref 39–50)
HCT VFR BLD CALC: 40.9 % — SIGNIFICANT CHANGE UP (ref 39–50)
HDLC SERPL-MCNC: 39 MG/DL — LOW
HDLC SERPL-MCNC: 39 MG/DL — LOW
HGB BLD-MCNC: 13.9 G/DL — SIGNIFICANT CHANGE UP (ref 13–17)
HGB BLD-MCNC: 13.9 G/DL — SIGNIFICANT CHANGE UP (ref 13–17)
IMM GRANULOCYTES NFR BLD AUTO: 0.2 % — SIGNIFICANT CHANGE UP (ref 0–0.9)
IMM GRANULOCYTES NFR BLD AUTO: 0.2 % — SIGNIFICANT CHANGE UP (ref 0–0.9)
INR BLD: 1.2 RATIO — HIGH (ref 0.85–1.18)
INR BLD: 1.2 RATIO — HIGH (ref 0.85–1.18)
LIPID PNL WITH DIRECT LDL SERPL: 36 MG/DL — SIGNIFICANT CHANGE UP
LIPID PNL WITH DIRECT LDL SERPL: 36 MG/DL — SIGNIFICANT CHANGE UP
LYMPHOCYTES # BLD AUTO: 1.37 K/UL — SIGNIFICANT CHANGE UP (ref 1–3.3)
LYMPHOCYTES # BLD AUTO: 1.37 K/UL — SIGNIFICANT CHANGE UP (ref 1–3.3)
LYMPHOCYTES # BLD AUTO: 26 % — SIGNIFICANT CHANGE UP (ref 13–44)
LYMPHOCYTES # BLD AUTO: 26 % — SIGNIFICANT CHANGE UP (ref 13–44)
MCHC RBC-ENTMCNC: 31.9 PG — SIGNIFICANT CHANGE UP (ref 27–34)
MCHC RBC-ENTMCNC: 31.9 PG — SIGNIFICANT CHANGE UP (ref 27–34)
MCHC RBC-ENTMCNC: 34 GM/DL — SIGNIFICANT CHANGE UP (ref 32–36)
MCHC RBC-ENTMCNC: 34 GM/DL — SIGNIFICANT CHANGE UP (ref 32–36)
MCV RBC AUTO: 93.8 FL — SIGNIFICANT CHANGE UP (ref 80–100)
MCV RBC AUTO: 93.8 FL — SIGNIFICANT CHANGE UP (ref 80–100)
MONOCYTES # BLD AUTO: 0.59 K/UL — SIGNIFICANT CHANGE UP (ref 0–0.9)
MONOCYTES # BLD AUTO: 0.59 K/UL — SIGNIFICANT CHANGE UP (ref 0–0.9)
MONOCYTES NFR BLD AUTO: 11.2 % — SIGNIFICANT CHANGE UP (ref 2–14)
MONOCYTES NFR BLD AUTO: 11.2 % — SIGNIFICANT CHANGE UP (ref 2–14)
NEUTROPHILS # BLD AUTO: 3.23 K/UL — SIGNIFICANT CHANGE UP (ref 1.8–7.4)
NEUTROPHILS # BLD AUTO: 3.23 K/UL — SIGNIFICANT CHANGE UP (ref 1.8–7.4)
NEUTROPHILS NFR BLD AUTO: 61.2 % — SIGNIFICANT CHANGE UP (ref 43–77)
NEUTROPHILS NFR BLD AUTO: 61.2 % — SIGNIFICANT CHANGE UP (ref 43–77)
NON HDL CHOLESTEROL: 50 MG/DL — SIGNIFICANT CHANGE UP
NON HDL CHOLESTEROL: 50 MG/DL — SIGNIFICANT CHANGE UP
PLATELET # BLD AUTO: 167 K/UL — SIGNIFICANT CHANGE UP (ref 150–400)
PLATELET # BLD AUTO: 167 K/UL — SIGNIFICANT CHANGE UP (ref 150–400)
POTASSIUM SERPL-MCNC: 4.5 MMOL/L — SIGNIFICANT CHANGE UP (ref 3.5–5.3)
POTASSIUM SERPL-MCNC: 4.5 MMOL/L — SIGNIFICANT CHANGE UP (ref 3.5–5.3)
POTASSIUM SERPL-SCNC: 4.5 MMOL/L — SIGNIFICANT CHANGE UP (ref 3.5–5.3)
POTASSIUM SERPL-SCNC: 4.5 MMOL/L — SIGNIFICANT CHANGE UP (ref 3.5–5.3)
PROCALCITONIN SERPL-MCNC: 0.09 NG/ML — SIGNIFICANT CHANGE UP (ref 0.02–0.1)
PROCALCITONIN SERPL-MCNC: 0.09 NG/ML — SIGNIFICANT CHANGE UP (ref 0.02–0.1)
PROT SERPL-MCNC: 5.9 G/DL — LOW (ref 6.6–8.7)
PROT SERPL-MCNC: 5.9 G/DL — LOW (ref 6.6–8.7)
PROTHROM AB SERPL-ACNC: 13.2 SEC — HIGH (ref 9.5–13)
PROTHROM AB SERPL-ACNC: 13.2 SEC — HIGH (ref 9.5–13)
RBC # BLD: 4.36 M/UL — SIGNIFICANT CHANGE UP (ref 4.2–5.8)
RBC # BLD: 4.36 M/UL — SIGNIFICANT CHANGE UP (ref 4.2–5.8)
RBC # FLD: 13.2 % — SIGNIFICANT CHANGE UP (ref 10.3–14.5)
RBC # FLD: 13.2 % — SIGNIFICANT CHANGE UP (ref 10.3–14.5)
SODIUM SERPL-SCNC: 138 MMOL/L — SIGNIFICANT CHANGE UP (ref 135–145)
SODIUM SERPL-SCNC: 138 MMOL/L — SIGNIFICANT CHANGE UP (ref 135–145)
TRIGL SERPL-MCNC: 68 MG/DL — SIGNIFICANT CHANGE UP
TRIGL SERPL-MCNC: 68 MG/DL — SIGNIFICANT CHANGE UP
TROPONIN T, HIGH SENSITIVITY RESULT: 37 NG/L — SIGNIFICANT CHANGE UP (ref 0–51)
TROPONIN T, HIGH SENSITIVITY RESULT: 37 NG/L — SIGNIFICANT CHANGE UP (ref 0–51)
TSH SERPL-MCNC: 0.56 UIU/ML — SIGNIFICANT CHANGE UP (ref 0.27–4.2)
TSH SERPL-MCNC: 0.56 UIU/ML — SIGNIFICANT CHANGE UP (ref 0.27–4.2)
WBC # BLD: 5.27 K/UL — SIGNIFICANT CHANGE UP (ref 3.8–10.5)
WBC # BLD: 5.27 K/UL — SIGNIFICANT CHANGE UP (ref 3.8–10.5)
WBC # FLD AUTO: 5.27 K/UL — SIGNIFICANT CHANGE UP (ref 3.8–10.5)
WBC # FLD AUTO: 5.27 K/UL — SIGNIFICANT CHANGE UP (ref 3.8–10.5)

## 2023-12-13 PROCEDURE — 76705 ECHO EXAM OF ABDOMEN: CPT | Mod: 26

## 2023-12-13 PROCEDURE — 99232 SBSQ HOSP IP/OBS MODERATE 35: CPT

## 2023-12-13 PROCEDURE — 99255 IP/OBS CONSLTJ NEW/EST HI 80: CPT

## 2023-12-13 PROCEDURE — 93010 ELECTROCARDIOGRAM REPORT: CPT

## 2023-12-13 PROCEDURE — 93321 DOPPLER ECHO F-UP/LMTD STD: CPT | Mod: 26

## 2023-12-13 PROCEDURE — 93308 TTE F-UP OR LMTD: CPT | Mod: 26

## 2023-12-13 RX ORDER — THIAMINE MONONITRATE (VIT B1) 100 MG
100 TABLET ORAL DAILY
Refills: 0 | Status: COMPLETED | OUTPATIENT
Start: 2023-12-13 | End: 2023-12-15

## 2023-12-13 RX ORDER — IPRATROPIUM/ALBUTEROL SULFATE 18-103MCG
3 AEROSOL WITH ADAPTER (GRAM) INHALATION EVERY 6 HOURS
Refills: 0 | Status: DISCONTINUED | OUTPATIENT
Start: 2023-12-13 | End: 2023-12-13

## 2023-12-13 RX ORDER — ALBUTEROL 90 UG/1
2 AEROSOL, METERED ORAL EVERY 6 HOURS
Refills: 0 | Status: DISCONTINUED | OUTPATIENT
Start: 2023-12-13 | End: 2023-12-22

## 2023-12-13 RX ORDER — GUAIFENESIN/DEXTROMETHORPHAN 600MG-30MG
20 TABLET, EXTENDED RELEASE 12 HR ORAL
Refills: 0 | Status: DISCONTINUED | OUTPATIENT
Start: 2023-12-13 | End: 2023-12-22

## 2023-12-13 RX ORDER — FUROSEMIDE 40 MG
40 TABLET ORAL DAILY
Refills: 0 | Status: DISCONTINUED | OUTPATIENT
Start: 2023-12-13 | End: 2023-12-13

## 2023-12-13 RX ORDER — FUROSEMIDE 40 MG
40 TABLET ORAL
Refills: 0 | Status: DISCONTINUED | OUTPATIENT
Start: 2023-12-13 | End: 2023-12-14

## 2023-12-13 RX ADMIN — ENOXAPARIN SODIUM 40 MILLIGRAM(S): 100 INJECTION SUBCUTANEOUS at 05:27

## 2023-12-13 RX ADMIN — Medication 50 MILLIGRAM(S): at 22:35

## 2023-12-13 RX ADMIN — Medication 1 TABLET(S): at 12:35

## 2023-12-13 RX ADMIN — Medication 3 MILLILITER(S): at 14:22

## 2023-12-13 RX ADMIN — Medication 50 MILLIGRAM(S): at 07:06

## 2023-12-13 RX ADMIN — Medication 1 MILLIGRAM(S): at 12:35

## 2023-12-13 RX ADMIN — Medication 100 MILLIGRAM(S): at 05:27

## 2023-12-13 RX ADMIN — Medication 50 MILLIGRAM(S): at 12:35

## 2023-12-13 RX ADMIN — Medication 50 MILLIGRAM(S): at 01:25

## 2023-12-13 RX ADMIN — Medication 100 MILLIGRAM(S): at 12:35

## 2023-12-13 NOTE — ED ADULT NURSE REASSESSMENT NOTE - NS ED NURSE REASSESS COMMENT FT1
Patient A&Ox4, denies sob/chest pain, in NAD.  Pt returned from echo/MRI, placed back on cardiac monitor as ordered.  CIWA reassessment is ongoing, scoring as charted. Safety maintained with bed locked, in lowest position.
Patient A&Ox4, resting on stretcher, in NAD.  Pt admitted to medicine, awaiting room placement.  Safety maintained with bed locked in lowest position.
Pt a& ox4;ambulating to bathroom with steady gait; breathing equal and unlabored. pt on cardiac monitor reading 94 bpm in sinus rhythm. Pt  at 100% on room air. pt bed is locked and in lowest position.
Pt a&o x4; breathing equal and unlabored resting comfortably in stretcher. pt awaiting for bed in CDU. pt aware of plan of care. pt on cardiac  monitor reading 86 bpm in bundle branch block; MD made aware and states the new EKG is similar to previous one; . pt bed is locked and in lowest position.
Received patient resting in stretcher in NAD,  A&Ox4, denies sob/chest pain.  Pt is admitted to medicine with RSV,  awaiting room placement.
Report received from RN; pt is a&o x4 and ambulating with steady gait. pt on cardiac monitor reading 88 bpm in sinus rhythm and  at 100% on room air. pt breathing equal and unlabored; bed is locked and in lowest position.
assumed care of patient from S.R. at 1930. patient resting in stretcher without complaint. no s/s of acute distress noted. patient breathing unlabored and equal. patient safety maintained.
report given to [employer] PILAR Mckenzie. patient a/ox3 sitting in stretcher on continuous cardiac monitor NSR. patient vitals recorded in EMR. patient showing no acute signs of distress. patient makes no complaints at this time. IV intact and working well. patient safety maintained.
patient resting in stretcher on continuous cardiac monitor. patient shows no acute signs of distress. patient makes no complaints at this time. safety maintained.

## 2023-12-13 NOTE — PROGRESS NOTE ADULT - SUBJECTIVE AND OBJECTIVE BOX
Bandar Chan M.D.    Patient is a 53y old  Male who presents with a chief complaint of Dyspnea on exertion (12 Dec 2023 23:38)      SUBJECTIVE / OVERNIGHT EVENTS: admitted overnight.     Patient denies chest pain, SOB, abd pain, N/V, fever, chills, dysuria or any other complaints. All remainder ROS negative.     MEDICATIONS  (STANDING):  albuterol/ipratropium for Nebulization 3 milliLiter(s) Nebulizer every 6 hours  chlordiazePOXIDE   Oral   chlordiazePOXIDE 50 milliGRAM(s) Oral every 8 hours  enoxaparin Injectable 40 milliGRAM(s) SubCutaneous every 12 hours  folic acid 1 milliGRAM(s) Oral daily  multivitamin 1 Tablet(s) Oral daily  thiamine Injectable 100 milliGRAM(s) IV Push daily    MEDICATIONS  (PRN):  acetaminophen     Tablet .. 650 milliGRAM(s) Oral every 6 hours PRN Temp greater or equal to 38C (100.4F), Mild Pain (1 - 3)  aluminum hydroxide/magnesium hydroxide/simethicone Suspension 30 milliLiter(s) Oral every 4 hours PRN Dyspepsia  guaifenesin/dextromethorphan Oral Liquid 20 milliLiter(s) Oral four times a day PRN Cough  LORazepam   Injectable 2 milliGRAM(s) IV Push every 1 hour PRN Symptom-triggered: each CIWA -Ar score 8 or GREATER  melatonin 3 milliGRAM(s) Oral at bedtime PRN Insomnia  ondansetron Injectable 4 milliGRAM(s) IV Push every 8 hours PRN Nausea and/or Vomiting      I&O's Summary      PHYSICAL EXAM:  Vital Signs Last 24 Hrs  T(C): 36.7 (13 Dec 2023 11:41), Max: 36.9 (12 Dec 2023 23:52)  T(F): 98 (13 Dec 2023 11:41), Max: 98.5 (12 Dec 2023 23:52)  HR: 93 (13 Dec 2023 11:41) (86 - 106)  BP: 113/82 (13 Dec 2023 11:41) (107/75 - 128/80)  BP(mean): --  RR: 18 (13 Dec 2023 11:41) (18 - 18)  SpO2: 94% (13 Dec 2023 11:41) (94% - 98%)    Parameters below as of 13 Dec 2023 11:41  Patient On (Oxygen Delivery Method): room air      CONSTITUTIONAL: NAD, well-groomed  RESPIRATORY: Normal respiratory effort; b/l expiratory wheezes noted  CARDIOVASCULAR: Regular rate and rhythm; No lower extremity edema  ABDOMEN: Nontender to palpation, normoactive bowel sounds  PSYCH: A+O x3; affect appropriate  NEUROLOGY: CN 2-12 are intact and symmetric; no gross sensory deficits;   SKIN: No rashes; no palpable lesions    LABS:                        13.9   5.27  )-----------( 167      ( 13 Dec 2023 07:18 )             40.9     12-13    138  |  99  |  16.0  ----------------------------<  101<H>  4.5   |  27.0  |  0.94    Ca    8.7      13 Dec 2023 07:18  Mg     1.8     12-12    TPro  5.9<L>  /  Alb  3.4  /  TBili  1.2  /  DBili  x   /  AST  23  /  ALT  19  /  AlkPhos  93  12-13    PT/INR - ( 13 Dec 2023 07:18 )   PT: 13.2 sec;   INR: 1.20 ratio         PTT - ( 13 Dec 2023 07:18 )  PTT:30.4 sec      Urinalysis Basic - ( 13 Dec 2023 07:18 )    Color: x / Appearance: x / SG: x / pH: x  Gluc: 101 mg/dL / Ketone: x  / Bili: x / Urobili: x   Blood: x / Protein: x / Nitrite: x   Leuk Esterase: x / RBC: x / WBC x   Sq Epi: x / Non Sq Epi: x / Bacteria: x        CAPILLARY BLOOD GLUCOSE          RADIOLOGY & ADDITIONAL TESTS:  Results Reviewed:   Imaging Personally Reviewed:  Electrocardiogram Personally Reviewed: Bandar hCan M.D.    Patient is a 53y old  Male who presents with a chief complaint of Dyspnea on exertion (12 Dec 2023 23:38)      SUBJECTIVE / OVERNIGHT EVENTS: admitted overnight.     Patient denies chest pain, SOB, abd pain, N/V, fever, chills, dysuria or any other complaints. All remainder ROS negative.     MEDICATIONS  (STANDING):  albuterol/ipratropium for Nebulization 3 milliLiter(s) Nebulizer every 6 hours  chlordiazePOXIDE   Oral   chlordiazePOXIDE 50 milliGRAM(s) Oral every 8 hours  enoxaparin Injectable 40 milliGRAM(s) SubCutaneous every 12 hours  folic acid 1 milliGRAM(s) Oral daily  multivitamin 1 Tablet(s) Oral daily  thiamine Injectable 100 milliGRAM(s) IV Push daily    MEDICATIONS  (PRN):  acetaminophen     Tablet .. 650 milliGRAM(s) Oral every 6 hours PRN Temp greater or equal to 38C (100.4F), Mild Pain (1 - 3)  aluminum hydroxide/magnesium hydroxide/simethicone Suspension 30 milliLiter(s) Oral every 4 hours PRN Dyspepsia  guaifenesin/dextromethorphan Oral Liquid 20 milliLiter(s) Oral four times a day PRN Cough  LORazepam   Injectable 2 milliGRAM(s) IV Push every 1 hour PRN Symptom-triggered: each CIWA -Ar score 8 or GREATER  melatonin 3 milliGRAM(s) Oral at bedtime PRN Insomnia  ondansetron Injectable 4 milliGRAM(s) IV Push every 8 hours PRN Nausea and/or Vomiting      I&O's Summary      PHYSICAL EXAM:  Vital Signs Last 24 Hrs  T(C): 36.7 (13 Dec 2023 11:41), Max: 36.9 (12 Dec 2023 23:52)  T(F): 98 (13 Dec 2023 11:41), Max: 98.5 (12 Dec 2023 23:52)  HR: 93 (13 Dec 2023 11:41) (86 - 106)  BP: 113/82 (13 Dec 2023 11:41) (107/75 - 128/80)  BP(mean): --  RR: 18 (13 Dec 2023 11:41) (18 - 18)  SpO2: 94% (13 Dec 2023 11:41) (94% - 98%)    Parameters below as of 13 Dec 2023 11:41  Patient On (Oxygen Delivery Method): room air      CONSTITUTIONAL: NAD, well-groomed  RESPIRATORY: Normal respiratory effort; b/l expiratory wheezes noted  CARDIOVASCULAR: Regular rate and rhythm; No lower extremity edema  ABDOMEN: Nontender to palpation, normoactive bowel sounds  PSYCH: A+O x3; affect appropriate  NEUROLOGY: CN 2-12 are intact and symmetric; no gross sensory deficits;   SKIN: No rashes; no palpable lesions    LABS:                        13.9   5.27  )-----------( 167      ( 13 Dec 2023 07:18 )             40.9     12-13    138  |  99  |  16.0  ----------------------------<  101<H>  4.5   |  27.0  |  0.94    Ca    8.7      13 Dec 2023 07:18  Mg     1.8     12-12    TPro  5.9<L>  /  Alb  3.4  /  TBili  1.2  /  DBili  x   /  AST  23  /  ALT  19  /  AlkPhos  93  12-13    PT/INR - ( 13 Dec 2023 07:18 )   PT: 13.2 sec;   INR: 1.20 ratio         PTT - ( 13 Dec 2023 07:18 )  PTT:30.4 sec      Urinalysis Basic - ( 13 Dec 2023 07:18 )    Color: x / Appearance: x / SG: x / pH: x  Gluc: 101 mg/dL / Ketone: x  / Bili: x / Urobili: x   Blood: x / Protein: x / Nitrite: x   Leuk Esterase: x / RBC: x / WBC x   Sq Epi: x / Non Sq Epi: x / Bacteria: x        CAPILLARY BLOOD GLUCOSE          RADIOLOGY & ADDITIONAL TESTS:  Results Reviewed:   Imaging Personally Reviewed:  Electrocardiogram Personally Reviewed:

## 2023-12-13 NOTE — PROGRESS NOTE ADULT - ASSESSMENT
53/M, alcohol use disorder (2-3 rum shots)/ night, last drink 12/10, no other significant pmhx presented to the ED for complaints of dyspnea on exertion which began approximately 1 week ago. RVP- RSV +    Acute Onset Dyspnea on Exertion  - No respiratory distress, saturating well on RA   - Viral LRTI vs Acute onset CHF   - RSV +ve on RVP  - Pro BNP in 7000's, no orthopnea, pedal edema or other clinical features    - S/p 40mg IVP Lasix x1 in ED, cxr mildly congested  - start lasix QD, likely change to PO in 1-2 days  - TTE pending  - Cardiology consulted, nayla recs  - h/o smoking, and wheezes on exam, Duoneb added  - LE doppler pending    Alcohol use disorder  - Last day of alcohol use 12/10  - CIWA protocol initiated   - On Librium taper, with Ativan PRN  - IM thiamine 100mg x1  - IV Thiamine 100mg q24x 3 days ordered  - Folic acid, MVI   - Resources offered for detox    RUQ pain  - Sporadic in nature  - CMP WNL   - USG RUQ to r/o fatty liver given alcohol use and overweight status    VTE prophylaxis- Lovenox  Diet- Regular     53/M, alcohol use disorder (2-3 rum shots)/ night, last drink 12/10, no other significant pmhx presented to the ED for complaints of dyspnea on exertion which began approximately 1 week ago. RVP- RSV +    Acute Onset Dyspnea on Exertion  - No respiratory distress, saturating well on RA   - Viral LRTI vs Acute onset CHF   - RSV +ve on RVP  - Pro BNP in 7000's  - S/p 40mg IVP Lasix x1 in ED, cxr mildly congested  - start lasix IV, diuresis per cards  -TTE pending  - Cardiology consulted, nayla recs  - h/o smoking, and wheezes on exam, Duoneb added  - LE doppler pending    Alcohol use disorder  - Last day of alcohol use 12/10  - CIWA protocol initiated   - On Librium taper, with Ativan PRN  - IM thiamine 100mg x1  - IV Thiamine 100mg q24x 3 days ordered  - Folic acid, MVI   - Resources offered for detox    RUQ pain  - Sporadic in nature  - CMP WNL   - USG RUQ to r/o fatty liver given alcohol use and overweight status    VTE prophylaxis- Lovenox  Diet- Regular

## 2023-12-14 DIAGNOSIS — I47.29 OTHER VENTRICULAR TACHYCARDIA: ICD-10-CM

## 2023-12-14 DIAGNOSIS — I50.21 ACUTE SYSTOLIC (CONGESTIVE) HEART FAILURE: ICD-10-CM

## 2023-12-14 DIAGNOSIS — F10.10 ALCOHOL ABUSE, UNCOMPLICATED: ICD-10-CM

## 2023-12-14 DIAGNOSIS — B33.8 OTHER SPECIFIED VIRAL DISEASES: ICD-10-CM

## 2023-12-14 LAB
ANION GAP SERPL CALC-SCNC: 11 MMOL/L — SIGNIFICANT CHANGE UP (ref 5–17)
ANION GAP SERPL CALC-SCNC: 11 MMOL/L — SIGNIFICANT CHANGE UP (ref 5–17)
BUN SERPL-MCNC: 21.4 MG/DL — HIGH (ref 8–20)
BUN SERPL-MCNC: 21.4 MG/DL — HIGH (ref 8–20)
CALCIUM SERPL-MCNC: 8 MG/DL — LOW (ref 8.4–10.5)
CALCIUM SERPL-MCNC: 8 MG/DL — LOW (ref 8.4–10.5)
CHLORIDE SERPL-SCNC: 99 MMOL/L — SIGNIFICANT CHANGE UP (ref 96–108)
CHLORIDE SERPL-SCNC: 99 MMOL/L — SIGNIFICANT CHANGE UP (ref 96–108)
CO2 SERPL-SCNC: 26 MMOL/L — SIGNIFICANT CHANGE UP (ref 22–29)
CO2 SERPL-SCNC: 26 MMOL/L — SIGNIFICANT CHANGE UP (ref 22–29)
CREAT SERPL-MCNC: 1.02 MG/DL — SIGNIFICANT CHANGE UP (ref 0.5–1.3)
CREAT SERPL-MCNC: 1.02 MG/DL — SIGNIFICANT CHANGE UP (ref 0.5–1.3)
EGFR: 87 ML/MIN/1.73M2 — SIGNIFICANT CHANGE UP
EGFR: 87 ML/MIN/1.73M2 — SIGNIFICANT CHANGE UP
FERRITIN SERPL-MCNC: 102 NG/ML — SIGNIFICANT CHANGE UP (ref 30–400)
FERRITIN SERPL-MCNC: 102 NG/ML — SIGNIFICANT CHANGE UP (ref 30–400)
GLUCOSE SERPL-MCNC: 113 MG/DL — HIGH (ref 70–99)
GLUCOSE SERPL-MCNC: 113 MG/DL — HIGH (ref 70–99)
HBV SURFACE AB SER-ACNC: SIGNIFICANT CHANGE UP
HBV SURFACE AB SER-ACNC: SIGNIFICANT CHANGE UP
HCV AB S/CO SERPL IA: 0.12 S/CO — SIGNIFICANT CHANGE UP (ref 0–0.99)
HCV AB S/CO SERPL IA: 0.12 S/CO — SIGNIFICANT CHANGE UP (ref 0–0.99)
HCV AB SERPL-IMP: SIGNIFICANT CHANGE UP
HCV AB SERPL-IMP: SIGNIFICANT CHANGE UP
HIV 1 & 2 AB SERPL IA.RAPID: SIGNIFICANT CHANGE UP
HIV 1 & 2 AB SERPL IA.RAPID: SIGNIFICANT CHANGE UP
MAGNESIUM SERPL-MCNC: 1.6 MG/DL — SIGNIFICANT CHANGE UP (ref 1.6–2.6)
MAGNESIUM SERPL-MCNC: 1.6 MG/DL — SIGNIFICANT CHANGE UP (ref 1.6–2.6)
PHOSPHATE SERPL-MCNC: 4.2 MG/DL — SIGNIFICANT CHANGE UP (ref 2.4–4.7)
PHOSPHATE SERPL-MCNC: 4.2 MG/DL — SIGNIFICANT CHANGE UP (ref 2.4–4.7)
POTASSIUM SERPL-MCNC: 4.5 MMOL/L — SIGNIFICANT CHANGE UP (ref 3.5–5.3)
POTASSIUM SERPL-MCNC: 4.5 MMOL/L — SIGNIFICANT CHANGE UP (ref 3.5–5.3)
POTASSIUM SERPL-SCNC: 4.5 MMOL/L — SIGNIFICANT CHANGE UP (ref 3.5–5.3)
POTASSIUM SERPL-SCNC: 4.5 MMOL/L — SIGNIFICANT CHANGE UP (ref 3.5–5.3)
SODIUM SERPL-SCNC: 136 MMOL/L — SIGNIFICANT CHANGE UP (ref 135–145)
SODIUM SERPL-SCNC: 136 MMOL/L — SIGNIFICANT CHANGE UP (ref 135–145)

## 2023-12-14 PROCEDURE — 93970 EXTREMITY STUDY: CPT | Mod: 26

## 2023-12-14 PROCEDURE — 99233 SBSQ HOSP IP/OBS HIGH 50: CPT

## 2023-12-14 PROCEDURE — 99223 1ST HOSP IP/OBS HIGH 75: CPT

## 2023-12-14 RX ORDER — METOPROLOL TARTRATE 50 MG
25 TABLET ORAL DAILY
Refills: 0 | Status: DISCONTINUED | OUTPATIENT
Start: 2023-12-14 | End: 2023-12-14

## 2023-12-14 RX ORDER — LOSARTAN POTASSIUM 100 MG/1
25 TABLET, FILM COATED ORAL DAILY
Refills: 0 | Status: DISCONTINUED | OUTPATIENT
Start: 2023-12-14 | End: 2023-12-16

## 2023-12-14 RX ORDER — METOPROLOL TARTRATE 50 MG
12.5 TABLET ORAL EVERY 12 HOURS
Refills: 0 | Status: DISCONTINUED | OUTPATIENT
Start: 2023-12-14 | End: 2023-12-15

## 2023-12-14 RX ORDER — ALPRAZOLAM 0.25 MG
0.25 TABLET ORAL ONCE
Refills: 0 | Status: DISCONTINUED | OUTPATIENT
Start: 2023-12-14 | End: 2023-12-14

## 2023-12-14 RX ORDER — FUROSEMIDE 40 MG
40 TABLET ORAL DAILY
Refills: 0 | Status: DISCONTINUED | OUTPATIENT
Start: 2023-12-15 | End: 2023-12-15

## 2023-12-14 RX ADMIN — Medication 1 TABLET(S): at 13:01

## 2023-12-14 RX ADMIN — Medication 40 MILLIGRAM(S): at 06:19

## 2023-12-14 RX ADMIN — Medication 0.25 MILLIGRAM(S): at 02:30

## 2023-12-14 RX ADMIN — Medication 50 MILLIGRAM(S): at 13:01

## 2023-12-14 RX ADMIN — ENOXAPARIN SODIUM 40 MILLIGRAM(S): 100 INJECTION SUBCUTANEOUS at 17:18

## 2023-12-14 RX ADMIN — Medication 50 MILLIGRAM(S): at 06:19

## 2023-12-14 RX ADMIN — Medication 100 MILLIGRAM(S): at 13:02

## 2023-12-14 RX ADMIN — Medication 12.5 MILLIGRAM(S): at 17:18

## 2023-12-14 RX ADMIN — Medication 1 MILLIGRAM(S): at 13:01

## 2023-12-14 NOTE — PROGRESS NOTE ADULT - SUBJECTIVE AND OBJECTIVE BOX
Bandar Chan M.D.    Patient is a 53y old  Male who presents with a chief complaint of Dyspnea on exertion (14 Dec 2023 11:40)      SUBJECTIVE / OVERNIGHT EVENTS: aide translated at bedside. Denies any SOB. CIWA 0-1 overnight.     Patient denies chest pain, SOB, abd pain, N/V, fever, chills, dysuria or any other complaints. All remainder ROS negative.     MEDICATIONS  (STANDING):  albuterol    90 MICROgram(s) HFA Inhaler 2 Puff(s) Inhalation every 6 hours  chlordiazePOXIDE   Oral   chlordiazePOXIDE 50 milliGRAM(s) Oral every 8 hours  enoxaparin Injectable 40 milliGRAM(s) SubCutaneous every 12 hours  folic acid 1 milliGRAM(s) Oral daily  losartan 25 milliGRAM(s) Oral daily  metoprolol tartrate 12.5 milliGRAM(s) Oral every 12 hours  multivitamin 1 Tablet(s) Oral daily  thiamine Injectable 100 milliGRAM(s) IV Push daily    MEDICATIONS  (PRN):  acetaminophen     Tablet .. 650 milliGRAM(s) Oral every 6 hours PRN Temp greater or equal to 38C (100.4F), Mild Pain (1 - 3)  aluminum hydroxide/magnesium hydroxide/simethicone Suspension 30 milliLiter(s) Oral every 4 hours PRN Dyspepsia  guaifenesin/dextromethorphan Oral Liquid 20 milliLiter(s) Oral four times a day PRN Cough  LORazepam   Injectable 2 milliGRAM(s) IV Push every 1 hour PRN Symptom-triggered: each CIWA -Ar score 8 or GREATER  melatonin 3 milliGRAM(s) Oral at bedtime PRN Insomnia  ondansetron Injectable 4 milliGRAM(s) IV Push every 8 hours PRN Nausea and/or Vomiting      I&O's Summary    13 Dec 2023 07:01  -  14 Dec 2023 07:00  --------------------------------------------------------  IN: 340 mL / OUT: 0 mL / NET: 340 mL        PHYSICAL EXAM:  Vital Signs Last 24 Hrs  T(C): 36.6 (14 Dec 2023 08:24), Max: 37.2 (13 Dec 2023 15:31)  T(F): 97.8 (14 Dec 2023 08:24), Max: 99 (13 Dec 2023 15:31)  HR: 93 (14 Dec 2023 08:24) (93 - 103)  BP: 104/73 (14 Dec 2023 08:24) (92/68 - 122/85)  BP(mean): --  RR: 19 (14 Dec 2023 08:24) (16 - 27)  SpO2: 95% (14 Dec 2023 08:24) (92% - 96%)    Parameters below as of 14 Dec 2023 08:24  Patient On (Oxygen Delivery Method): room air    CONSTITUTIONAL: NAD, well-groomed  RESPIRATORY: Normal respiratory effort; b/l expiratory wheezes noted  CARDIOVASCULAR: Regular rate and rhythm; No lower extremity edema  ABDOMEN: Nontender to palpation, normoactive bowel sounds  PSYCH: A+O x3; affect appropriate  NEUROLOGY: CN 2-12 are intact and symmetric; no gross sensory deficits;   SKIN: No rashes; no palpable lesions    LABS:                        13.9   5.27  )-----------( 167      ( 13 Dec 2023 07:18 )             40.9     12-14    136  |  99  |  21.4<H>  ----------------------------<  113<H>  4.5   |  26.0  |  1.02    Ca    8.0<L>      14 Dec 2023 06:14  Phos  4.2     12-14  Mg     1.6     12-14    TPro  5.9<L>  /  Alb  3.4  /  TBili  1.2  /  DBili  x   /  AST  23  /  ALT  19  /  AlkPhos  93  12-13    PT/INR - ( 13 Dec 2023 07:18 )   PT: 13.2 sec;   INR: 1.20 ratio         PTT - ( 13 Dec 2023 07:18 )  PTT:30.4 sec      Urinalysis Basic - ( 14 Dec 2023 06:14 )    Color: x / Appearance: x / SG: x / pH: x  Gluc: 113 mg/dL / Ketone: x  / Bili: x / Urobili: x   Blood: x / Protein: x / Nitrite: x   Leuk Esterase: x / RBC: x / WBC x   Sq Epi: x / Non Sq Epi: x / Bacteria: x        CAPILLARY BLOOD GLUCOSE          RADIOLOGY & ADDITIONAL TESTS:  Results Reviewed:   Imaging Personally Reviewed:  Electrocardiogram Personally Reviewed:

## 2023-12-14 NOTE — CONSULT NOTE ADULT - TIME BILLING
Acute HFrEF, Dilated cardiomyopathy, Chronic alcohol abuse
- Generation of cardiovascular treatment plan.  - Coordination of care with primary team.

## 2023-12-14 NOTE — PROGRESS NOTE ADULT - NS ATTEND AMEND GEN_ALL_CORE FT
53M with PMH ETOH dependence admitted with acute HFrEF, newly diagnosed.  pBNP 7600s, EKG with bifascicular block. Troponin negative.  -LVEF 10-15% on POCUS  -Transition lasix to daily dosing  -LHC/RHC planned for tomorrow if patient is able to tolerate (on CIWA protocol/librium taper)  -Labs for causes of NICM in progress   -Advanced HF team following   -Started on losartan, beta blocker. GDMT will be limited as patient is uninsured

## 2023-12-14 NOTE — PROGRESS NOTE ADULT - ASSESSMENT
A/P: 52 y/o obese male with PMHx of alcohol abuse (2 shots of tequila daily) presented to ED c/o  BARCENAS and PND x  8 days ago. 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 Pt also co cough since yesterday. Pt denies fever, chest pain, nausea, vomits, abdominal pain, headache, or UTI.

## 2023-12-14 NOTE — CONSULT NOTE ADULT - ASSESSMENT
Initial HF Consult: Dr. Hall    52 y/o [place] speaking male originally from [facility] with history of Etoh use (2 shots of tequila/day) presented to ED 12/12 with c/o dyspnea for ~1week.   Tested positive for RSV, +cough. CXR revealed clear lungs and cardiomegaly. EKG with bifascicular block, possible old inferolateral infarct and frequent ventricular ectopy. Troponin negative x 3.   Bedside POCUS revealed dilated cardiomyopathy (LVEF 10-15%, LVIDd >6.5cm), BiV dysfunction, tethereed MV with MR and right sided pleural effusion.   ProBNP 7690. Treated with IV Lasix 40mg BID. Awaiting full TTE and LE duplex. TSH wdl, HIV neg. Hep and Chagas pending.    Initial HF Consult: Dr. Hall    52 y/o [place] speaking male originally from [facility] with history of Etoh use (2 shots of tequila/day) presented to ED 12/12 with c/o dyspnea for ~1week.   Tested positive for RSV, +cough. CXR revealed clear lungs and cardiomegaly. EKG with bifascicular block, possible old inferolateral infarct and frequent ventricular ectopy. Troponin negative x 3.   Bedside POCUS revealed dilated cardiomyopathy (LVEF 10-15%, LVIDd >6.5cm), BiV dysfunction, tethereed MV with MR and right sided pleural effusion.   ProBNP 7690. Treated with IV Lasix 40mg BID. Awaiting full TTE and LE duplex. TSH wdl, HIV neg. Hep and Chagas pending.

## 2023-12-14 NOTE — CONSULT NOTE ADULT - NS_MD_PANP_GEN_ALL_CORE
Attending and PA/NP shared services statement (NON-critical care):
Attending and PA/NP shared services statement (NON-critical care):
unable to assess; patient intubated

## 2023-12-14 NOTE — PROGRESS NOTE ADULT - SUBJECTIVE AND OBJECTIVE BOX
[facility]                                                         CARDIOLOGY AT [facility]                                                                  [address], [place] [address]                                                         Telephone: [phone]. Fax:[phone]                                                                             PROGRESS NOTE    Reason for follow up: HFrEF  Update: Spoke to patient via "[employer] ID [id number]. Patient states his breathing is better today, but patient still on CIWA. Patient with some ventricular ectopy overnight as well, and he is amenable to R/LHC tomorrow if mental status is stable.       Review of symptoms:   Cardiac:  No chest pain. No dyspnea. No palpitations.  Respiratory: no cough. No dyspnea  Gastrointestinal: No diarrhea. No abdominal pain. No bleeding.   Neuro: No focal neuro complaints.    Vitals:  T(C): 36.6 (12-14-23 @ 08:24), Max: 37.2 (12-13-23 @ 15:31)  HR: 93 (12-14-23 @ 08:24) (93 - 103)  BP: 104/73 (12-14-23 @ 08:24) (92/68 - 122/85)  RR: 19 (12-14-23 @ 08:24) (16 - 27)  SpO2: 95% (12-14-23 @ 08:24) (92% - 96%)  Wt(kg): --  I&O's Summary    13 Dec 2023 07:01  -  14 Dec 2023 07:00  --------------------------------------------------------  IN: 340 mL / OUT: 0 mL / NET: 340 mL      Weight (kg): 79.5 (12-12 @ 14:13)    PHYSICAL EXAM:  Appearance: Comfortable. No acute distress  HEENT:  Atraumatic. Normocephalic.  Normal oral mucosa  Neurologic: A & O x 3, no gross focal deficits.  Cardiovascular: RRR S1 S2, No murmur, no rubs/gallops. No JVD  Respiratory: Coarse breath sounds and wheezing bilaterally   Gastrointestinal:  Soft, Non-tender, + BS  Lower Extremities: 2+ Peripheral Pulses, No clubbing, cyanosis, trace LE edema  Psychiatry: Patient is calm. No agitation.   Skin: warm and dry.    CURRENT CARDIAC MEDICATIONS:  furosemide   Injectable 40 milliGRAM(s) IV Push two times a day      CURRENT OTHER MEDICATIONS:  albuterol    90 MICROgram(s) HFA Inhaler 2 Puff(s) Inhalation every 6 hours  guaifenesin/dextromethorphan Oral Liquid 20 milliLiter(s) Oral four times a day PRN Cough  acetaminophen     Tablet .. 650 milliGRAM(s) Oral every 6 hours PRN Temp greater or equal to 38C (100.4F), Mild Pain (1 - 3)  chlordiazePOXIDE   Oral   chlordiazePOXIDE 50 milliGRAM(s) Oral every 8 hours  LORazepam   Injectable 2 milliGRAM(s) IV Push every 1 hour PRN Symptom-triggered: each CIWA -Ar score 8 or GREATER  melatonin 3 milliGRAM(s) Oral at bedtime PRN Insomnia  ondansetron Injectable 4 milliGRAM(s) IV Push every 8 hours PRN Nausea and/or Vomiting  aluminum hydroxide/magnesium hydroxide/simethicone Suspension 30 milliLiter(s) Oral every 4 hours PRN Dyspepsia  enoxaparin Injectable 40 milliGRAM(s) SubCutaneous every 12 hours  folic acid 1 milliGRAM(s) Oral daily  multivitamin 1 Tablet(s) Oral daily  thiamine Injectable 100 milliGRAM(s) IV Push daily, Stop order after: 3 Days      LABS:	 	                            13.9   5.27  )-----------( 167      ( 13 Dec 2023 07:18 )             40.9     12-14    136  |  99  |  21.4<H>  ----------------------------<  113<H>  4.5   |  26.0  |  1.02    Ca    8.0<L>      14 Dec 2023 06:14  Phos  4.2     12-14  Mg     1.6     12-14    TPro  5.9<L>  /  Alb  3.4  /  TBili  1.2  /  DBili  x   /  AST  23  /  ALT  19  /  AlkPhos  93  12-13    PT/INR/PTT ( 13 Dec 2023 07:18 )                       :                       :      13.2         :       30.4                  .        .                   .              .           .       1.20        .                                       Lipid Profile: Date: 12-13 @ 07:18  Total cholesterol 89; Direct LDL: --; HDL: 39; Triglycerides:68    HgA1c:   TSH: Thyroid Stimulating Hormone, Serum: 0.56 uIU/mL      TELEMETRY: SR, PVCS, NSVT  ECG:    DIAGNOSTIC TESTING:  [ ] Echocardiogram:   < from: TTE Limited W or WO Ultrasound Enhancing Agent (12.13.23 @ 17:02) >  CONCLUSIONS:      1. The left atrium is severely dilated.   2. The left ventricular volumes are severely increased.   3. Left ventricular cavity is severely dilated. Left ventricular systolic function is severely decreased with an ejection fraction visually estimated at <10 %.   4. There is severe (grade 3) left ventricular diastolic dysfunction, with elevated filling pressure.   5. The right atrium is severely dilated in size.   6. Severely enlarged right ventricular cavity size and severely reduced systolic function.   7. Mild mitral regurgitation.   8. Tricuspid valve leaflets appear normal. Severe tricuspid regurgitation.   9. Estimated pulmonary artery systolic pressure is 46 mmHg, consistent with mild pulmonary hypertension.  10. Trace pericardial effusion.  11. Team aware of the findings.    ________________________________________________________________________________________    < end of copied text >    [ ]  Catheterization:  [ ] Stress Test:    OTHER: 	                                                                [facility]                                                         CARDIOLOGY AT [facility]                                                                  [address], [place] [address]                                                         Telephone: [phone]. Fax:[phone]                                                                             PROGRESS NOTE    Reason for follow up: HFrEF  Update: Spoke to patient via [employer] ID [id number]. Patient states his breathing is better today, but patient still on CIWA. Patient with some ventricular ectopy overnight as well, and he is amenable to R/LHC tomorrow if mental status is stable.       Review of symptoms:   Cardiac:  No chest pain. No dyspnea. No palpitations.  Respiratory: no cough. No dyspnea  Gastrointestinal: No diarrhea. No abdominal pain. No bleeding.   Neuro: No focal neuro complaints.    Vitals:  T(C): 36.6 (12-14-23 @ 08:24), Max: 37.2 (12-13-23 @ 15:31)  HR: 93 (12-14-23 @ 08:24) (93 - 103)  BP: 104/73 (12-14-23 @ 08:24) (92/68 - 122/85)  RR: 19 (12-14-23 @ 08:24) (16 - 27)  SpO2: 95% (12-14-23 @ 08:24) (92% - 96%)  Wt(kg): --  I&O's Summary    13 Dec 2023 07:01  -  14 Dec 2023 07:00  --------------------------------------------------------  IN: 340 mL / OUT: 0 mL / NET: 340 mL      Weight (kg): 79.5 (12-12 @ 14:13)    PHYSICAL EXAM:  Appearance: Comfortable. No acute distress  HEENT:  Atraumatic. Normocephalic.  Normal oral mucosa  Neurologic: A & O x 3, no gross focal deficits.  Cardiovascular: RRR S1 S2, No murmur, no rubs/gallops. No JVD  Respiratory: Coarse breath sounds and wheezing bilaterally   Gastrointestinal:  Soft, Non-tender, + BS  Lower Extremities: 2+ Peripheral Pulses, No clubbing, cyanosis, trace LE edema  Psychiatry: Patient is calm. No agitation.   Skin: warm and dry.    CURRENT CARDIAC MEDICATIONS:  furosemide   Injectable 40 milliGRAM(s) IV Push two times a day      CURRENT OTHER MEDICATIONS:  albuterol    90 MICROgram(s) HFA Inhaler 2 Puff(s) Inhalation every 6 hours  guaifenesin/dextromethorphan Oral Liquid 20 milliLiter(s) Oral four times a day PRN Cough  acetaminophen     Tablet .. 650 milliGRAM(s) Oral every 6 hours PRN Temp greater or equal to 38C (100.4F), Mild Pain (1 - 3)  chlordiazePOXIDE   Oral   chlordiazePOXIDE 50 milliGRAM(s) Oral every 8 hours  LORazepam   Injectable 2 milliGRAM(s) IV Push every 1 hour PRN Symptom-triggered: each CIWA -Ar score 8 or GREATER  melatonin 3 milliGRAM(s) Oral at bedtime PRN Insomnia  ondansetron Injectable 4 milliGRAM(s) IV Push every 8 hours PRN Nausea and/or Vomiting  aluminum hydroxide/magnesium hydroxide/simethicone Suspension 30 milliLiter(s) Oral every 4 hours PRN Dyspepsia  enoxaparin Injectable 40 milliGRAM(s) SubCutaneous every 12 hours  folic acid 1 milliGRAM(s) Oral daily  multivitamin 1 Tablet(s) Oral daily  thiamine Injectable 100 milliGRAM(s) IV Push daily, Stop order after: 3 Days      LABS:	 	                            13.9   5.27  )-----------( 167      ( 13 Dec 2023 07:18 )             40.9     12-14    136  |  99  |  21.4<H>  ----------------------------<  113<H>  4.5   |  26.0  |  1.02    Ca    8.0<L>      14 Dec 2023 06:14  Phos  4.2     12-14  Mg     1.6     12-14    TPro  5.9<L>  /  Alb  3.4  /  TBili  1.2  /  DBili  x   /  AST  23  /  ALT  19  /  AlkPhos  93  12-13    PT/INR/PTT ( 13 Dec 2023 07:18 )                       :                       :      13.2         :       30.4                  .        .                   .              .           .       1.20        .                                       Lipid Profile: Date: 12-13 @ 07:18  Total cholesterol 89; Direct LDL: --; HDL: 39; Triglycerides:68    HgA1c:   TSH: Thyroid Stimulating Hormone, Serum: 0.56 uIU/mL      TELEMETRY: SR, PVCS, NSVT  ECG:    DIAGNOSTIC TESTING:  [ ] Echocardiogram:   < from: TTE Limited W or WO Ultrasound Enhancing Agent (12.13.23 @ 17:02) >  CONCLUSIONS:      1. The left atrium is severely dilated.   2. The left ventricular volumes are severely increased.   3. Left ventricular cavity is severely dilated. Left ventricular systolic function is severely decreased with an ejection fraction visually estimated at <10 %.   4. There is severe (grade 3) left ventricular diastolic dysfunction, with elevated filling pressure.   5. The right atrium is severely dilated in size.   6. Severely enlarged right ventricular cavity size and severely reduced systolic function.   7. Mild mitral regurgitation.   8. Tricuspid valve leaflets appear normal. Severe tricuspid regurgitation.   9. Estimated pulmonary artery systolic pressure is 46 mmHg, consistent with mild pulmonary hypertension.  10. Trace pericardial effusion.  11. Team aware of the findings.    ________________________________________________________________________________________    < end of copied text >    [ ]  Catheterization:  [ ] Stress Test:    OTHER:

## 2023-12-14 NOTE — CONSULT NOTE ADULT - PROBLEM SELECTOR RECOMMENDATION 9
ACC/AHA Stage C, NYHA class II-III symptoms  Newly diagnosed dilated cardiomyopathy (LVEF 10-15%, LVIDd >6.5cm). Complete TTE result pending.  Possible etiologies include Etoh abuse, Chaga's (lab pending). Will need [facility] to rule out obstructive CAD-can tentatively plan for tomorrow .  Clinically euvolemic w/ warm extremities.  Preserved end organ function.  GDMT: Start losartan 25mg daily. Lopressor 12.5mg BID. Will plan to add MRA tomorrow. ARNi and SGLT2i will be cost prohibitive given the patient is uninsured.   Diuretics: Change IV lasix to 40mg PO daily  Low Na+, FR diet recommended ACC/AHA Stage C, NYHA class II-III symptoms  Newly diagnosed dilated cardiomyopathy (LVEF 10-15%, LVIDd >6.5cm). Complete TTE result pending.  Possible etiologies include Etoh abuse, Chaga's (lab pending). Will need [facility] to rule out obstructive CAD-can tentatively plan for tomorrow .  Clinically euvolemic w/ warm extremities.  Preserved end organ function.  GDMT: Start losartan 25mg daily. Lopressor 12.5mg BID. Will plan to add MRA tomorrow. ARNi and SGLT2i will be cost prohibitive given the patient is uninsured.   Diuretics: Change IV lasix to 40mg PO daily  Low Na+, FR diet recommended

## 2023-12-14 NOTE — PROGRESS NOTE ADULT - ASSESSMENT
53/M, alcohol use disorder (2-3 rum shots)/ night, last drink 12/10, no other significant pmhx presented to the ED for complaints of dyspnea on exertion which began approximately 1 week ago. RVP- RSV +    Acute systolic heart failure  - No respiratory distress, saturating well on RA   - Viral LRTI vs Acute onset CHF   - RSV +ve on RVP  - Pro BNP in 7000's  - S/p 40mg IVP Lasix x1 in ED, cxr mildly congested  - TTE with new EF < 10%  - HF on board, c/w Lasix per HF, losartan added  - h/o smoking, and wheezes on exam, Duoneb added  - LE doppler without DVT    Alcohol use disorder  - Last day of alcohol use 12/10  - CIWA protocol initiated   - On Librium taper, with Ativan PRN  - IM thiamine 100mg x1  - IV Thiamine 100mg q24x 3 days ordered  - Folic acid, MVI   - Resources offered for detox    RUQ pain  - Sporadic in nature  - CMP WNL   - RUQ without acute findings    VTE prophylaxis- Lovenox  Diet- Regular  Dispo: pending cardiac workup/optimization

## 2023-12-14 NOTE — CHART NOTE - NSCHARTNOTEFT_GEN_A_CORE
Called by RN after alerted by monitor tech of pt having non sustained SVT and 6 beats of V tach.  Pt is c/o anxiety.  Denies any other complaints.  /76  P 100  R 27 SpO2 96% on RA.  CIWA score of 1.  Xanax 0.25mg pox1 ordered for anxiety.  AM labs ordered.  Monitor and escalate prn.  Followed up w/ RN for pt's status, all vitals improved, pt is resting comfortably.

## 2023-12-14 NOTE — CONSULT NOTE ADULT - SUBJECTIVE AND OBJECTIVE BOX
[facility] Advanced Heart Failure - Initial Consult Note  [address] [place] [address]  Office Phone: [phone]/Fax: [phone]  Service/On Call Phone [phone]    HPI:  52 y/o Tajik speaking male originally from [facility] with history of Etoh use (2 shots of tequila/day) presented to ED 12/12 with c/o dyspnea for ~1week.     PAST MEDICAL & SURGICAL HISTORY:  Alcohol abuse  History of repair of laceration      REVIEW OF SYSTEMS:  14 point ROS negative in detail apart from as documented in HPI.    MEDICATIONS  (STANDING):  albuterol    90 MICROgram(s) HFA Inhaler 2 Puff(s) Inhalation every 6 hours  chlordiazePOXIDE   Oral   chlordiazePOXIDE 50 milliGRAM(s) Oral every 8 hours  enoxaparin Injectable 40 milliGRAM(s) SubCutaneous every 12 hours  folic acid 1 milliGRAM(s) Oral daily  furosemide   Injectable 40 milliGRAM(s) IV Push two times a day  metoprolol tartrate 12.5 milliGRAM(s) Oral every 12 hours  multivitamin 1 Tablet(s) Oral daily  thiamine Injectable 100 milliGRAM(s) IV Push daily    MEDICATIONS  (PRN):  acetaminophen     Tablet .. 650 milliGRAM(s) Oral every 6 hours PRN Temp greater or equal to 38C (100.4F), Mild Pain (1 - 3)  aluminum hydroxide/magnesium hydroxide/simethicone Suspension 30 milliLiter(s) Oral every 4 hours PRN Dyspepsia  guaifenesin/dextromethorphan Oral Liquid 20 milliLiter(s) Oral four times a day PRN Cough  LORazepam   Injectable 2 milliGRAM(s) IV Push every 1 hour PRN Symptom-triggered: each CIWA -Ar score 8 or GREATER  melatonin 3 milliGRAM(s) Oral at bedtime PRN Insomnia  ondansetron Injectable 4 milliGRAM(s) IV Push every 8 hours PRN Nausea and/or Vomiting    Home Medications:    Allergies:  No Known Allergies    Social History:  Reports drinking alcohol for many years, 1-2 shots of tequila most days.  Denies illicit drug use.  Denies smoking.    Family History:  No pertinent family history in first degree relatives.    Vital Signs Last 24 Hrs  T(C): 36.6, Max: 37.2 (12-13 @ 15:31)  HR: 93 (93 - 103)  BP: 104/73 (92/68 - 122/85)  RR: 19 (16 - 27)  SpO2: 95% (92% - 96%)    I&O's Summary Last 24 Hrs    IN: 340 mL / OUT: 0 mL / NET: 340 mL    Tele: SR, BBB, frequent multifocal PVCs/couplets/triplets     Physical Exam:  General: In no distress.   HEENT: EOMs intact.  Neck: Neck supple. JVP not elevated.   Chest: Anterior CTA.  CV: RRR. Normal S1 and S2. No murmurs, rub, or gallops. Warm peripherally.  PV: Trace B/L pretibial edema. Pulses full/equal in all four extremities.  Abdomen: Soft, non-distended, non-tender.  Skin: warm, dry, no rash.  Neurology: Alert and oriented times three. Sensation intact.  Psych: Appropriate affect.    Labs:    ( 12-13-23 @ 07:18 )               13.9   5.27  )--------( 167                  40.9     ( 12-14-23 @ 06:14 )     136  |  99  |  21.4  ---------------------<  113  4.5  |  26.0  |  1.02    Ca 8.0  Phos 4.2  Mg 1.6    ( 12-13-23 @ 07:18 )  TPro  5.9  /  Alb  3.4  /  TBili  1.2  /  DBili  x   /  AST  23  /  ALT  19  /  AlkPhos  93  ( 12-12-23 @ 17:19 )  TPro  6.2  /  Alb  3.7  /  TBili  0.6  /  DBili  x   /  AST  27  /  ALT  21  /  AlkPhos  106    PTT/PT/INR - ( 12-13-23 @ 07:18 )  PTT: 30.4 sec / PT: 13.2 sec / INR: 1.20 ratio    ( 12-13-23 @ 07:18 )  TropHS 37    / CK x     / CKMB x      ( 12-12-23 @ 19:22 )  TropHS 37    / CK x     / CKMB x                     [facility] Advanced Heart Failure - Initial Consult Note  [address] [place] [address]  Office Phone: [phone]/Fax: [phone]  Service/On Call Phone [phone]    HPI:  54 y/o Tajik speaking male originally from [facility] with history of Etoh use (2 shots of tequila/day) presented to ED 12/12 with c/o dyspnea for ~1week.     PAST MEDICAL & SURGICAL HISTORY:  Alcohol abuse  History of repair of laceration      REVIEW OF SYSTEMS:  14 point ROS negative in detail apart from as documented in HPI.    MEDICATIONS  (STANDING):  albuterol    90 MICROgram(s) HFA Inhaler 2 Puff(s) Inhalation every 6 hours  chlordiazePOXIDE   Oral   chlordiazePOXIDE 50 milliGRAM(s) Oral every 8 hours  enoxaparin Injectable 40 milliGRAM(s) SubCutaneous every 12 hours  folic acid 1 milliGRAM(s) Oral daily  furosemide   Injectable 40 milliGRAM(s) IV Push two times a day  metoprolol tartrate 12.5 milliGRAM(s) Oral every 12 hours  multivitamin 1 Tablet(s) Oral daily  thiamine Injectable 100 milliGRAM(s) IV Push daily    MEDICATIONS  (PRN):  acetaminophen     Tablet .. 650 milliGRAM(s) Oral every 6 hours PRN Temp greater or equal to 38C (100.4F), Mild Pain (1 - 3)  aluminum hydroxide/magnesium hydroxide/simethicone Suspension 30 milliLiter(s) Oral every 4 hours PRN Dyspepsia  guaifenesin/dextromethorphan Oral Liquid 20 milliLiter(s) Oral four times a day PRN Cough  LORazepam   Injectable 2 milliGRAM(s) IV Push every 1 hour PRN Symptom-triggered: each CIWA -Ar score 8 or GREATER  melatonin 3 milliGRAM(s) Oral at bedtime PRN Insomnia  ondansetron Injectable 4 milliGRAM(s) IV Push every 8 hours PRN Nausea and/or Vomiting    Home Medications:    Allergies:  No Known Allergies    Social History:  Reports drinking alcohol for many years, 1-2 shots of tequila most days.  Denies illicit drug use.  Denies smoking.    Family History:  No pertinent family history in first degree relatives.    Vital Signs Last 24 Hrs  T(C): 36.6, Max: 37.2 (12-13 @ 15:31)  HR: 93 (93 - 103)  BP: 104/73 (92/68 - 122/85)  RR: 19 (16 - 27)  SpO2: 95% (92% - 96%)    I&O's Summary Last 24 Hrs    IN: 340 mL / OUT: 0 mL / NET: 340 mL    Tele: SR, BBB, frequent multifocal PVCs/couplets/triplets     Physical Exam:  General: In no distress.   HEENT: EOMs intact.  Neck: Neck supple. JVP not elevated.   Chest: Anterior CTA.  CV: RRR. Normal S1 and S2. No murmurs, rub, or gallops. Warm peripherally.  PV: Trace B/L pretibial edema. Pulses full/equal in all four extremities.  Abdomen: Soft, non-distended, non-tender.  Skin: warm, dry, no rash.  Neurology: Alert and oriented times three. Sensation intact.  Psych: Appropriate affect.    Labs:    ( 12-13-23 @ 07:18 )               13.9   5.27  )--------( 167                  40.9     ( 12-14-23 @ 06:14 )     136  |  99  |  21.4  ---------------------<  113  4.5  |  26.0  |  1.02    Ca 8.0  Phos 4.2  Mg 1.6    ( 12-13-23 @ 07:18 )  TPro  5.9  /  Alb  3.4  /  TBili  1.2  /  DBili  x   /  AST  23  /  ALT  19  /  AlkPhos  93  ( 12-12-23 @ 17:19 )  TPro  6.2  /  Alb  3.7  /  TBili  0.6  /  DBili  x   /  AST  27  /  ALT  21  /  AlkPhos  106    PTT/PT/INR - ( 12-13-23 @ 07:18 )  PTT: 30.4 sec / PT: 13.2 sec / INR: 1.20 ratio    ( 12-13-23 @ 07:18 )  TropHS 37    / CK x     / CKMB x      ( 12-12-23 @ 19:22 )  TropHS 37    / CK x     / CKMB x

## 2023-12-14 NOTE — PROGRESS NOTE ADULT - PROBLEM SELECTOR PLAN 1
- Echocardiogram with EF <10%, grade III DD, severely reduced RV function, mild MR, severe TR, and mild pulmonary HTN.   - Advanced Heart Failure consult placed.   - GDMT for HFrEF  Beta Blocker: Due to ectopy, start metoprolol 12.5mg PO BID with holding parameters. Transition to Toprol XL   RAAS Inhibitor: Start losartan 25mg PO qday, unable to add Entresto due to lack of insurance.  MRA: Initiate prior to D/C.   Diuretic: Continue lasix 40mg IV BID.   SGLT2i: Unable to add at this time due to lack of insurance.   - NPO after midnight.   - Plan for R/LHC tomorrow if patient's respiratory and mental status allow.

## 2023-12-15 ENCOUNTER — TRANSCRIPTION ENCOUNTER (OUTPATIENT)
Age: 53
End: 2023-12-15

## 2023-12-15 LAB
ANION GAP SERPL CALC-SCNC: 11 MMOL/L — SIGNIFICANT CHANGE UP (ref 5–17)
ANION GAP SERPL CALC-SCNC: 11 MMOL/L — SIGNIFICANT CHANGE UP (ref 5–17)
BUN SERPL-MCNC: 21.6 MG/DL — HIGH (ref 8–20)
BUN SERPL-MCNC: 21.6 MG/DL — HIGH (ref 8–20)
CALCIUM SERPL-MCNC: 8.2 MG/DL — LOW (ref 8.4–10.5)
CALCIUM SERPL-MCNC: 8.2 MG/DL — LOW (ref 8.4–10.5)
CHLORIDE SERPL-SCNC: 101 MMOL/L — SIGNIFICANT CHANGE UP (ref 96–108)
CHLORIDE SERPL-SCNC: 101 MMOL/L — SIGNIFICANT CHANGE UP (ref 96–108)
CO2 SERPL-SCNC: 28 MMOL/L — SIGNIFICANT CHANGE UP (ref 22–29)
CO2 SERPL-SCNC: 28 MMOL/L — SIGNIFICANT CHANGE UP (ref 22–29)
CREAT SERPL-MCNC: 0.86 MG/DL — SIGNIFICANT CHANGE UP (ref 0.5–1.3)
CREAT SERPL-MCNC: 0.86 MG/DL — SIGNIFICANT CHANGE UP (ref 0.5–1.3)
EGFR: 103 ML/MIN/1.73M2 — SIGNIFICANT CHANGE UP
EGFR: 103 ML/MIN/1.73M2 — SIGNIFICANT CHANGE UP
GLUCOSE SERPL-MCNC: 92 MG/DL — SIGNIFICANT CHANGE UP (ref 70–99)
GLUCOSE SERPL-MCNC: 92 MG/DL — SIGNIFICANT CHANGE UP (ref 70–99)
MAGNESIUM SERPL-MCNC: 1.7 MG/DL — SIGNIFICANT CHANGE UP (ref 1.6–2.6)
MAGNESIUM SERPL-MCNC: 1.7 MG/DL — SIGNIFICANT CHANGE UP (ref 1.6–2.6)
PHOSPHATE SERPL-MCNC: 4.3 MG/DL — SIGNIFICANT CHANGE UP (ref 2.4–4.7)
PHOSPHATE SERPL-MCNC: 4.3 MG/DL — SIGNIFICANT CHANGE UP (ref 2.4–4.7)
POTASSIUM SERPL-MCNC: 4 MMOL/L — SIGNIFICANT CHANGE UP (ref 3.5–5.3)
POTASSIUM SERPL-MCNC: 4 MMOL/L — SIGNIFICANT CHANGE UP (ref 3.5–5.3)
POTASSIUM SERPL-SCNC: 4 MMOL/L — SIGNIFICANT CHANGE UP (ref 3.5–5.3)
POTASSIUM SERPL-SCNC: 4 MMOL/L — SIGNIFICANT CHANGE UP (ref 3.5–5.3)
SODIUM SERPL-SCNC: 140 MMOL/L — SIGNIFICANT CHANGE UP (ref 135–145)
SODIUM SERPL-SCNC: 140 MMOL/L — SIGNIFICANT CHANGE UP (ref 135–145)

## 2023-12-15 PROCEDURE — 93567 NJX CAR CTH SPRVLV AORTGRPHY: CPT

## 2023-12-15 PROCEDURE — 93460 R&L HRT ART/VENTRICLE ANGIO: CPT | Mod: 26

## 2023-12-15 PROCEDURE — 93010 ELECTROCARDIOGRAM REPORT: CPT

## 2023-12-15 PROCEDURE — 99232 SBSQ HOSP IP/OBS MODERATE 35: CPT

## 2023-12-15 PROCEDURE — 99233 SBSQ HOSP IP/OBS HIGH 50: CPT

## 2023-12-15 RX ORDER — ASPIRIN/CALCIUM CARB/MAGNESIUM 324 MG
81 TABLET ORAL ONCE
Refills: 0 | Status: COMPLETED | OUTPATIENT
Start: 2023-12-15 | End: 2023-12-15

## 2023-12-15 RX ORDER — FUROSEMIDE 40 MG
40 TABLET ORAL
Refills: 0 | Status: DISCONTINUED | OUTPATIENT
Start: 2023-12-15 | End: 2023-12-16

## 2023-12-15 RX ORDER — MAGNESIUM SULFATE 500 MG/ML
1 VIAL (ML) INJECTION ONCE
Refills: 0 | Status: COMPLETED | OUTPATIENT
Start: 2023-12-15 | End: 2023-12-15

## 2023-12-15 RX ORDER — METOPROLOL TARTRATE 50 MG
25 TABLET ORAL AT BEDTIME
Refills: 0 | Status: DISCONTINUED | OUTPATIENT
Start: 2023-12-15 | End: 2023-12-21

## 2023-12-15 RX ORDER — SPIRONOLACTONE 25 MG/1
25 TABLET, FILM COATED ORAL DAILY
Refills: 0 | Status: DISCONTINUED | OUTPATIENT
Start: 2023-12-16 | End: 2023-12-17

## 2023-12-15 RX ADMIN — Medication 81 MILLIGRAM(S): at 11:37

## 2023-12-15 RX ADMIN — Medication 100 GRAM(S): at 08:35

## 2023-12-15 RX ADMIN — ENOXAPARIN SODIUM 40 MILLIGRAM(S): 100 INJECTION SUBCUTANEOUS at 17:49

## 2023-12-15 RX ADMIN — Medication 25 MILLIGRAM(S): at 21:20

## 2023-12-15 RX ADMIN — Medication 100 MILLIGRAM(S): at 17:48

## 2023-12-15 RX ADMIN — Medication 100 GRAM(S): at 10:40

## 2023-12-15 RX ADMIN — Medication 50 MILLIGRAM(S): at 06:13

## 2023-12-15 RX ADMIN — LOSARTAN POTASSIUM 25 MILLIGRAM(S): 100 TABLET, FILM COATED ORAL at 06:12

## 2023-12-15 RX ADMIN — Medication 50 MILLIGRAM(S): at 17:49

## 2023-12-15 RX ADMIN — Medication 40 MILLIGRAM(S): at 06:13

## 2023-12-15 RX ADMIN — Medication 12.5 MILLIGRAM(S): at 06:13

## 2023-12-15 NOTE — DISCHARGE NOTE PROVIDER - NSDCCPCAREPLAN_GEN_ALL_CORE_FT
PRINCIPAL DISCHARGE DIAGNOSIS  Diagnosis: Congestive heart failure (CHF)  Assessment and Plan of Treatment: limit fluids to 1500 ml/day and no added salt in food and don't drink alcohol.  follow up with cardiology as scheduled.

## 2023-12-15 NOTE — PROGRESS NOTE ADULT - NS ATTEND AMEND GEN_ALL_CORE FT
53M with PMH ETOH dependence admitted with acute HFrEF, newly diagnosed.  pBNP 7600s, EKG with bifascicular block. Troponin negative.  -LVEF <10%, severe biventricular failure,   -Transitioned to PO lasix  -LHC/RHC planned for today  -HF likely related to ETOH use, however labs for causes of NICM in progress   -Advanced HF team following   -Started on losartan, beta blocker. GDMT will be limited as patient is uninsured

## 2023-12-15 NOTE — PROGRESS NOTE ADULT - ASSESSMENT
53/M, alcohol use disorder (2-3 rum shots)/ night, last drink 12/10, no other significant pmhx presented to the ED for complaints of dyspnea on exertion which began approximately 1 week ago. RVP- RSV +    Acute systolic heart failure  - No respiratory distress, saturating well on RA   - Viral LRTI vs Acute onset CHF   - RSV +ve on RVP  - Pro BNP in 7000's  - S/p 40mg IVP Lasix x1 in ED, cxr mildly congested  - TTE with new EF < 10%  - HF on board, c/w Lasix PO, losartan added  - pending cardiac cath 12/15  - h/o smoking, and wheezes on exam, Duoneb added  - LE doppler without DVT    Alcohol use disorder  - Last day of alcohol use 12/10  - CIWA protocol initiated   - On Librium taper, with Ativan PRN  - IM thiamine 100mg x1  - IV Thiamine 100mg q24x 3 days ordered  - Folic acid, MVI   - Resources offered for detox    RUQ pain  - Sporadic in nature  - CMP WNL   - RUQ without acute findings    VTE prophylaxis- Lovenox  Diet- Regular  Dispo: pending cardiac workup/optimization    53/M, alcohol use disorder (2-3 rum shots)/ night, last drink 12/10, no other significant pmhx presented to the ED for complaints of dyspnea on exertion which began approximately 1 week ago. RVP- RSV +. Also found to have acute systolic heart failure, pending cardiac cath.     Acute systolic heart failure  - No respiratory distress, saturating well on RA   - Viral LRTI vs Acute onset CHF   - RSV +ve on RVP  - Pro BNP in 7000's  - S/p 40mg IVP Lasix x1 in ED, cxr mildly congested  - TTE with new EF < 10%  - HF on board, c/w Lasix PO, losartan added  - pending cardiac cath 12/15  - h/o smoking, and wheezes on exam, Duoneb added  - LE doppler without DVT    Alcohol use disorder  - Last day of alcohol use 12/10  - CIWA protocol initiated   - On Librium taper, with Ativan PRN  - IM thiamine 100mg x1  - IV Thiamine 100mg q24x 3 days ordered  - Folic acid, MVI   - Resources offered for detox    RUQ pain  - Sporadic in nature  - CMP WNL   - RUQ without acute findings    VTE prophylaxis- Lovenox  Diet- Regular  Dispo: pending cardiac workup/optimization

## 2023-12-15 NOTE — PROGRESS NOTE ADULT - SUBJECTIVE AND OBJECTIVE BOX
ADVANCED HEART FAILURE - PROGRESS NOTE  [address] [place] [address]  Office Phone: [phone]/Fax: [phone]  Service/On Call Phone [phone]  _______________________________________________________________________________________________________    Subjective:  - s/p R/LHC this morning    Medications:  acetaminophen     Tablet .. 650 milliGRAM(s) Oral every 6 hours PRN  albuterol    90 MICROgram(s) HFA Inhaler 2 Puff(s) Inhalation every 6 hours  aluminum hydroxide/magnesium hydroxide/simethicone Suspension 30 milliLiter(s) Oral every 4 hours PRN  chlordiazePOXIDE   Oral   chlordiazePOXIDE 50 milliGRAM(s) Oral every 12 hours  enoxaparin Injectable 40 milliGRAM(s) SubCutaneous every 12 hours  folic acid 1 milliGRAM(s) Oral daily  furosemide    Tablet 40 milliGRAM(s) Oral daily  guaifenesin/dextromethorphan Oral Liquid 20 milliLiter(s) Oral four times a day PRN  LORazepam   Injectable 2 milliGRAM(s) IV Push every 1 hour PRN  losartan 25 milliGRAM(s) Oral daily  melatonin 3 milliGRAM(s) Oral at bedtime PRN  metoprolol tartrate 12.5 milliGRAM(s) Oral every 12 hours  multivitamin 1 Tablet(s) Oral daily  ondansetron Injectable 4 milliGRAM(s) IV Push every 8 hours PRN  thiamine Injectable 100 milliGRAM(s) IV Push daily      ICU Vital Signs Last 24 Hrs  T(C): 36.7, Max: 37 (12-14 @ 16:29)  HR: 87 (80 - 99)  BP: 102/76 (88/66 - 126/82)  BP(mean): 85 (67 - 89)  ABP: --  ABP(mean): --  RR: 15 (14 - 28)  SpO2: 93% (93% - 98%)        I&O's Summary Last 24 Hrs    Tele: SR 80s    Physical Exam:    General: No distress. Comfortable.  Neck: JVP not elevated.  Respiratory: Clear to auscultation bilaterally  CV: RRR. Normal S1 and S2. No murmurs, rub, or gallops. Radial pulses normal.  Abdomen: Soft, non-distended, non-tender  Extremities: Warm, no edema  Neurology: Non-focal  Psych: Affect normal    Labs:      ( 12-15-23 @ 07:03 )     140  |  101  |  21.6  ---------------------<  92  4.0  |  28.0  |  0.86    Ca 8.2  Phos 4.3  Mg 1.7    ( 12-13-23 @ 07:18 )  TPro  5.9  /  Alb  3.4  /  TBili  1.2  /  DBili  x   /  AST  23  /  ALT  19  /  AlkPhos  93  ( 12-12-23 @ 17:19 )  TPro  6.2  /  Alb  3.7  /  TBili  0.6  /  DBili  x   /  AST  27  /  ALT  21  /  AlkPhos  106    ( 12-13-23 @ 07:18 )  TropHS 37    / CK x     / CKMB x      ( 12-12-23 @ 19:22 )  TropHS 37    / CK x     / CKMB x       ADVANCED HEART FAILURE - PROGRESS NOTE  [address] [place] [address]  Office Phone: [phone]/Fax: [phone]  Service/On Call Phone [phone]  _______________________________________________________________________________________________________    Subjective:  - s/p R/LHC this morning    Medications:  acetaminophen     Tablet .. 650 milliGRAM(s) Oral every 6 hours PRN  albuterol    90 MICROgram(s) HFA Inhaler 2 Puff(s) Inhalation every 6 hours  aluminum hydroxide/magnesium hydroxide/simethicone Suspension 30 milliLiter(s) Oral every 4 hours PRN  chlordiazePOXIDE   Oral   chlordiazePOXIDE 50 milliGRAM(s) Oral every 12 hours  enoxaparin Injectable 40 milliGRAM(s) SubCutaneous every 12 hours  folic acid 1 milliGRAM(s) Oral daily  furosemide    Tablet 40 milliGRAM(s) Oral daily  guaifenesin/dextromethorphan Oral Liquid 20 milliLiter(s) Oral four times a day PRN  LORazepam   Injectable 2 milliGRAM(s) IV Push every 1 hour PRN  losartan 25 milliGRAM(s) Oral daily  melatonin 3 milliGRAM(s) Oral at bedtime PRN  metoprolol tartrate 12.5 milliGRAM(s) Oral every 12 hours  multivitamin 1 Tablet(s) Oral daily  ondansetron Injectable 4 milliGRAM(s) IV Push every 8 hours PRN  thiamine Injectable 100 milliGRAM(s) IV Push daily      ICU Vital Signs Last 24 Hrs  T(C): 36.7, Max: 37 (12-14 @ 16:29)  HR: 87 (80 - 99)  BP: 102/76 (88/66 - 126/82)  BP(mean): 85 (67 - 89)  ABP: --  ABP(mean): --  RR: 15 (14 - 28)  SpO2: 93% (93% - 98%)        I&O's Summary Last 24 Hrs    Tele: SR 80s    Physical Exam:    General: No distress. Comfortable.  Neck: JVP not elevated.  Respiratory: Clear to auscultation bilaterally  CV: RRR. Normal S1 and S2. No murmurs, rub, or gallops. Radial pulses normal.  Abdomen: Soft, non-distended, non-tender  Extremities: Warm, no edema  Neurology: Non-focal  Psych: Affect normal    Labs:      ( 12-15-23 @ 07:03 )     140  |  101  |  21.6  ---------------------<  92  4.0  |  28.0  |  0.86    Ca 8.2  Phos 4.3  Mg 1.7    ( 12-13-23 @ 07:18 )  TPro  5.9  /  Alb  3.4  /  TBili  1.2  /  DBili  x   /  AST  23  /  ALT  19  /  AlkPhos  93  ( 12-12-23 @ 17:19 )  TPro  6.2  /  Alb  3.7  /  TBili  0.6  /  DBili  x   /  AST  27  /  ALT  21  /  AlkPhos  106    ( 12-13-23 @ 07:18 )  TropHS 37    / CK x     / CKMB x      ( 12-12-23 @ 19:22 )  TropHS 37    / CK x     / CKMB x

## 2023-12-15 NOTE — DISCHARGE NOTE PROVIDER - NSDCFUADDINST_GEN_ALL_CORE_FT
Restricted use with no heavy lifting of affected arm for 48 hours.  No submerging the arm in water for 48 hours.  You may start showering today.  Call your doctor for any bleeding, swelling, loss of sensation in the hand or fingers, or fingers turning blue.  If heavy bleeding or large lumps form, hold pressure at the spot and come to the Emergency Room.  Follow-up visit has been arranged at the [facility] in [place] on 1/3/24 with Dr. Odom however given significant cardiomyopathy he should also f/u in our HF clinic. An appointment has been arranged for 1/2/24 at 1:45pm with AJAY Johnson. He will need to apply on his own for sliding scale; contact # [phone].  Restricted use with no heavy lifting of affected arm for 48 hours.  No submerging the arm in water for 48 hours.  You may start showering today.  Call your doctor for any bleeding, swelling, loss of sensation in the hand or fingers, or fingers turning blue.  If heavy bleeding or large lumps form, hold pressure at the spot and come to the Emergency Room.  Follow-up visit has been arranged at the [facility] in [place] on 1/3/24 with Dr. Odom however given significant cardiomyopathy he should also f/u in our HF clinic. An appointment has been arranged for 1/2/24 at 1:45pm with AJAY Johnson. He will need to apply on his own for sliding scale; contact # [phone].  Restricted use with no heavy lifting of affected arm for 48 hours.  No submerging the arm in water for 48 hours.  You may start showering today.  Call your doctor for any bleeding, swelling, loss of sensation in the hand or fingers, or fingers turning blue.  If heavy bleeding or large lumps form, hold pressure at the spot and come to the Emergency Room.

## 2023-12-15 NOTE — DISCHARGE NOTE PROVIDER - NSDCFUSCHEDAPPT_GEN_ALL_CORE_FT
Sharon Johnson Physician [address]  Scheduled Appointment: 01/02/2024     Sharon Johnson Physician [address]  Scheduled Appointment: 01/02/2024

## 2023-12-15 NOTE — PROGRESS NOTE ADULT - ASSESSMENT
Initial HF Consult: Dr. Hall    54 y/o Citizen of [place] speaking male originally from [facility] with history of Etoh use (2 shots of tequila/day) presented to ED 12/12 with c/o dyspnea for ~1week.   Tested positive for RSV, +cough. CXR revealed clear lungs and cardiomegaly. EKG with bifascicular block, possible old inferolateral infarct and frequent ventricular ectopy. Troponin negative x 3.     Bedside POCUS revealed dilated cardiomyopathy (LVEF 10-15%, LVIDd >6.5cm), BiV dysfunction, tethereed MV with MR and right sided pleural effusion.   ProBNP 7690. Treated with IV Lasix 40mg BID. TSH wdl, HIV/Hep neg. Chagas pending.     He is s/p R/LHC this morning which showed normal coronary arteries, elevated left sided filling pressures, and CI 2.1. Will continue to optimize his GDMT and follow-up closely with him as an outpatient.    Cardiac Studies:  12/15/23 LHC: Normal coronary arteries. LVEDP 23 mmHg, Ao 101/82 (87).  12/15/23 RHC: RA 10, RV 36/11, PA 33/24/27, PCW 29 (v35), PA sat 64.6%, Christophe CO/CI 3.82/2.07.    12/13/23 TTE: LVIDd 6.7cm, LVEF <10%, grade III DD, severe RVE with severe RV dysfunction (TAPSE 1.0cm), severe RODNEY, mild MR, severe TR, mod-severe CA, est PASP 46 mmHg. Initial HF Consult: Dr. Hall    54 y/o [place] speaking male originally from [facility] with history of Etoh use (2 shots of tequila/day) presented to ED 12/12 with c/o dyspnea for ~1week.   Tested positive for RSV, +cough. CXR revealed clear lungs and cardiomegaly. EKG with bifascicular block, possible old inferolateral infarct and frequent ventricular ectopy. Troponin negative x 3.     Bedside POCUS revealed dilated cardiomyopathy (LVEF 10-15%, LVIDd >6.5cm), BiV dysfunction, tethereed MV with MR and right sided pleural effusion.   ProBNP 7690. Treated with IV Lasix 40mg BID. TSH wdl, HIV/Hep neg. Chagas pending.     He is s/p R/LHC this morning which showed normal coronary arteries, elevated left sided filling pressures, and CI 2.1. Will continue to optimize his GDMT and follow-up closely with him as an outpatient.    Cardiac Studies:  12/15/23 LHC: Normal coronary arteries. LVEDP 23 mmHg, Ao 101/82 (87).  12/15/23 RHC: RA 10, RV 36/11, PA 33/24/27, PCW 29 (v35), PA sat 64.6%, Christophe CO/CI 3.82/2.07.    12/13/23 TTE: LVIDd 6.7cm, LVEF <10%, grade III DD, severe RVE with severe RV dysfunction (TAPSE 1.0cm), severe RODNEY, mild MR, severe TR, mod-severe OH, est PASP 46 mmHg.

## 2023-12-15 NOTE — DISCHARGE NOTE PROVIDER - HOSPITAL COURSE
m m53/M, alcohol use disorder (2-3 rum shots)/ night, last drink 12/10, no other significant pmhx presented to the ED for complaints of dyspnea on exertion which began approximately 1 week ago. RVP- RSV +. Also found to have acute systolic heart failure. He was treated with IV diuretics with resolution of acute CHF. TTE with EF <10%. LHC showed normal coronaries. right heart cath with elevated pressures.  trypanosoma cruzi IgG Ab positive. no specific treatment indicated at this point. 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 Low BP limited GDMT and it took several days of playing around medications to find the best regimen he would tolerate. He is being discharged on losartan 12.5 mg and metoprolol XL 25 mg daily. He has also instructed to not drink alcohol as it may be contributing to cardiomyopathy. He has follow up appointment with cardiology. he also c/o dizziness for several days in the hospital. orthostatic hypotension was not detected and dizziness resolved on its own. RSV related symptoms resolved. he was treated for Alcohol withdrawal with librium taper.

## 2023-12-15 NOTE — DISCHARGE NOTE NURSING/CASE MANAGEMENT/SOCIAL WORK - PATIENT PORTAL LINK FT
You can access the FollowMyHealth Patient Portal offered by [facility] by registering at the following website: [email or web address]. By joining [employer]’s FollowMyHealth portal, you will also be able to view your health information using other applications (apps) compatible with our system. You can access the FollowMyHealth Patient Portal offered by [facility] by registering at the following website: [email or web address]. By joining [employer]’s FollowMyHealth portal, you will also be able to view your health information using other applications (apps) compatible with our system.

## 2023-12-15 NOTE — DISCHARGE NOTE NURSING/CASE MANAGEMENT/SOCIAL WORK - NSDCPEFALRISK_GEN_ALL_CORE
For information on Fall & Injury Prevention, visit: [email or web address] OR  [email or web address] OR  https://www.cdc.gov/steadi/patient.html For information on Fall & Injury Prevention, visit: [email or web address] OR  [email or web address] OR  https://www.cdc.gov/steadi/patient.html

## 2023-12-15 NOTE — DISCHARGE NOTE PROVIDER - ATTENDING DISCHARGE PHYSICAL EXAMINATION:
General: no acute distress  HEENT: normocephalic, atraumatic  Lungs: clear to auscultation  CVS: RRR. S1, S2. no added sounds  CNS: AAOx3. no focal deficit

## 2023-12-15 NOTE — DISCHARGE NOTE PROVIDER - NSDCADMDATE_GEN_ALL_CORE_FT
[address] [place] [address], [address] [facility] 
[phone] Patient: Amaury Hickman MRN: [id number] WFP:[birth year] Visit Information Date & Time Provider Department Dept. Phone Encounter #  
 6/6/2018  1:00 PM Iwona Momin, [address] [facility] [phone] [id number] Follow-up Instructions Return in about 2 weeks (around 6/20/2018) for follow up evaluation, staple removal. Upcoming Health Maintenance Date Due DTaP/Tdap/Td series (1 - Tdap) 8/28/2001 PAP AKA CERVICAL CYTOLOGY 8/28/2001 Influenza Age 5 to Adult 8/1/2018 Allergies as of 6/6/2018  Review Complete On: 6/6/2018 By: Iwona Momin PA-C Severity Noted Reaction Type Reactions Pcn [Penicillins] High 05/12/2017    Hives, Swelling, Other (comments) Throat closes Shellfish Derived High 05/12/2017    Hives, Swelling, Other (comments) Throat closes Current Immunizations  Never Reviewed No immunizations on file. Not reviewed this visit You Were Diagnosed With   
  
 Codes Comments Post-operative state    -  Primary ICD-10-CM: E72.241 ICD-9-CM: V45.89 Tendinitis of right peroneus brevis tendon     ICD-10-CM: M76.71 
ICD-9-CM: 727.06 Vitals BP Pulse Temp Resp Height(growth percentile) SpO2  
 121/88 (BP 1 Location: Right arm, BP Patient Position: Sitting) 75 97.8 °F (36.6 °C) (Oral) 16 5' 9\" (1.753 m) 98% OB Status Smoking Status IUD Never Smoker Preferred Pharmacy Pharmacy Name Phone [facility], [address] [place] [address] [phone] Your Updated Medication List  
  
   
This list is accurate as of 6/6/18  1:43 PM.  Always use your most recent med list.  
  
  
  
  
 cholecalciferol (VITAMIN D3) 5,000 unit Tab tablet Commonly known as:  VITAMIN D3 Take  by mouth every Wednesday. CLARITIN 10 mg tablet Generic drug:  loratadine Take 10 mg by mouth daily as needed for Allergies. HYDROcodone-acetaminophen 7.5-325 mg per tablet Commonly known as:  NORCO  
TAKE ONE TO TWO TABLETS BY MOUTH Q 4 - 6 HRS PRN PAIN **AFTER SURGERY**  Indications: Pain  
  
 multivitamin tablet Commonly known as:  ONE A DAY Take 1 Tab by mouth daily. naproxen 500 mg tablet Commonly known as:  NAPROSYN Take 1 Tab by mouth two (2) times daily (with meals). NASACORT 55 mcg nasal inhaler Generic drug:  triamcinolone 2 Sprays daily as needed. polyethylene glycol 17 gram packet Commonly known as:  Latisha Plane Take 1 Packet by mouth daily. Mix in 8 oz prune juice  
  
 promethazine 25 mg tablet Commonly known as:  PHENERGAN Take 1 Tab by mouth every six (6) hours as needed for Nausea. Prescriptions Printed Refills HYDROcodone-acetaminophen (NORCO) 7.5-325 mg per tablet 0 Sig: TAKE ONE TO TWO TABLETS BY MOUTH Q 4 - 6 HRS PRN PAIN **AFTER SURGERY**  Indications: Pain Class: Print  
 promethazine (PHENERGAN) 25 mg tablet 0 Sig: Take 1 Tab by mouth every six (6) hours as needed for Nausea. Class: Print Route: Oral  
  
Follow-up Instructions Return in about 2 weeks (around 6/20/2018) for follow up evaluation, staple removal.  
  
  
Patient Instructions · Dressing: changed in the office today. Patient placed in posterior splint · Keep the current dressings on and in place. You need to keep this incision clean and dry. If you have a splint or cast on please keep this clean and dry as well. · You should expect swelling and bruising in the area over the next several days. You may elevate the right extremity on 1 pillow. Place the pillow horizontal so that no pressure is on the back of your heel. You may apply an icepack on top of the dressing to help minimize the swelling. · Keep all pets away from  any wound present in order to prevent infection. · Continue Activity modification · Weight bearing status:  non weight bearing to the right lower extremity · No lifting, twisting, squatting, deep bending, driving or strenuous activity. PLEASE SEEK IMMEDIATE ASSESSMENT BY ER PHYSICIAN IF ANY OF THE FOLLOWING EXIST:  
 
Excessive pain, swelling, redness or odor of or around the surgical area Temperature over 100.5 Nausea and vomiting lasting longer than 4 hours or if unable to take medications Any signs of decreased circulation or nerve impairment to extremity: change in color, persistent numbness, tingling, coldness or increase pain If any calf pain, calf tightness, shortness of breath, chest pain Any difficulty breathing at rest or with ambulation, any chest tightness/soreness Severe intractable pain, persistent swelling or drainage, development of a wound, incisional redness, finger/toe swelling or color changes, or CALF PAIN 
 
· Perform ankle pumps with non-surgical/non-injured extremity to help reduce the risk of blood clots · Please follow up in 2 weeks · Continue taking Asprin as directed · Prescription for Norco 7.5/325 has been provided. Please take medication as directed Acute Pain After Surgery: Care Instructions Your Care Instructions It's common to have some pain after surgery. Pain doesn't mean that something is wrong or that the surgery didn't go well. But when the pain is severe, it's important to work with your doctor to manage it. It's also important to be aware of a few facts about pain and pain medicine. · You are the only person who knows what your pain feels like. So be sure to tell your doctor when you are in pain or when the pain changes. Then he or she will know how to adjust your medicines. · Pain is often easier to control right after it starts. So it may be better to take regular doses of pain medicine and not wait until the pain gets bad. · Medicine can help control pain. But this doesn't mean you'll have no pain. Medicine works to keep the pain at a level you can live with. With time, you will feel better. Follow-up care is a key part of your treatment and safety. Be sure to make and go to all appointments, and call your doctor if you are having problems. It's also a good idea to know your test results and keep a list of the medicines you take. How can you care for yourself at home? · Be safe with medicines. Read and follow all instructions on the label. ¨ If the doctor gave you a prescription medicine for pain, take it as prescribed. ¨ If you are not taking a prescription pain medicine, ask your doctor if you can take an over-the-counter medicine. · If you take an over-the-counter pain medicine, such as acetaminophen (Tylenol), ibuprofen (Advil, Motrin), or naproxen (Aleve), read and follow all instructions on the label. · Do not take two or more pain medicines at the same time unless the doctor told you to. · Do not drink alcohol while you are taking pain medicines. · Try to walk each day if your doctor recommends it. Start by walking a little more than you did the day before. Bit by bit, increase the amount you walk. Walking increases blood flow. It also helps prevent pneumonia and constipation. · To prevent constipation from opioid pain medicines: ¨ Talk to your doctor about a laxative. ¨ Include fruits, vegetables, beans, and whole grains in your diet each day. These foods are high in fiber. ¨ Drink plenty of fluids, enough so that your urine is light yellow or clear like water. Drink water, fruit juice, or other drinks that do not contain caffeine or alcohol. If you have kidney, heart, or liver disease and have to limit fluids, talk with your doctor before you increase the amount of fluids you drink.  
¨ Take a fiber supplement, such as Citrucel or Metamucil, every day if needed. Read and follow all instructions on the label. If you take pain medicine for more than a few days, talk to your doctor before you take fiber. When should you call for help? Call your doctor now or seek immediate medical care if: 
? · Your pain gets worse. ? · Your pain is not controlled by medicine. ? Watch closely for changes in your health, and be sure to contact your doctor if you have any problems. Where can you learn more? Go to http://petar-regis.info/. Enter (58) 215-565 in the search box to learn more about \"Acute Pain After Surgery: Care Instructions. \" Current as of: March 20, 2017 Content Version: 11.4 © 4579-0674 [employer]. Care instructions adapted under license by [employer] (which disclaims liability or warranty for this information). If you have questions about a medical condition or this instruction, always ask your healthcare professional. [place] [address] any warranty or liability for your use of this information. Introducing [facility]! Dear Radha Ojeda: 
Thank you for requesting a [employer] account. Our records indicate that you already have an active [employer] account. You can access your account anytime at [email or web address] Did you know that you can access your hospital and ER discharge instructions at any time in [employer]? You can also review all of your test results from your hospital stay or ER visit. Additional Information If you have questions, please visit the Frequently Asked Questions section of the [employer] website at [email or web address] Remember, [employer] is NOT to be used for urgent needs. For medical emergencies, dial 911. Now available from your iPhone and Android! Please provide this summary of care documentation to your next provider. Your primary care clinician is listed as Aleida Mireles.  If you have any questions after today's visit, please call [phone]. 12-Dec-2023 19:08

## 2023-12-15 NOTE — PROGRESS NOTE ADULT - SUBJECTIVE AND OBJECTIVE BOX
Bandar Chan M.D.    Patient is a 53y old  Male who presents with a chief complaint of Dyspnea on exertion (15 Dec 2023 10:26)      SUBJECTIVE / OVERNIGHT EVENTS: no concerns.     Patient denies chest pain, SOB, abd pain, N/V, fever, chills, dysuria or any other complaints. All remainder ROS negative.     MEDICATIONS  (STANDING):  albuterol    90 MICROgram(s) HFA Inhaler 2 Puff(s) Inhalation every 6 hours  chlordiazePOXIDE   Oral   chlordiazePOXIDE 50 milliGRAM(s) Oral every 12 hours  enoxaparin Injectable 40 milliGRAM(s) SubCutaneous every 12 hours  folic acid 1 milliGRAM(s) Oral daily  furosemide    Tablet 40 milliGRAM(s) Oral daily  losartan 25 milliGRAM(s) Oral daily  metoprolol tartrate 12.5 milliGRAM(s) Oral every 12 hours  multivitamin 1 Tablet(s) Oral daily  thiamine Injectable 100 milliGRAM(s) IV Push daily    MEDICATIONS  (PRN):  acetaminophen     Tablet .. 650 milliGRAM(s) Oral every 6 hours PRN Temp greater or equal to 38C (100.4F), Mild Pain (1 - 3)  aluminum hydroxide/magnesium hydroxide/simethicone Suspension 30 milliLiter(s) Oral every 4 hours PRN Dyspepsia  guaifenesin/dextromethorphan Oral Liquid 20 milliLiter(s) Oral four times a day PRN Cough  LORazepam   Injectable 2 milliGRAM(s) IV Push every 1 hour PRN Symptom-triggered: each CIWA -Ar score 8 or GREATER  melatonin 3 milliGRAM(s) Oral at bedtime PRN Insomnia  ondansetron Injectable 4 milliGRAM(s) IV Push every 8 hours PRN Nausea and/or Vomiting      I&O's Summary      PHYSICAL EXAM:  Vital Signs Last 24 Hrs  T(C): 36.7 (15 Dec 2023 09:01), Max: 37 (14 Dec 2023 16:29)  T(F): 98 (15 Dec 2023 09:01), Max: 98.6 (14 Dec 2023 16:29)  HR: 82 (15 Dec 2023 10:56) (82 - 99)  BP: 99/69 (15 Dec 2023 10:56) (99/69 - 126/82)  BP(mean): --  RR: 16 (15 Dec 2023 10:56) (16 - 19)  SpO2: 98% (15 Dec 2023 10:56) (94% - 98%)    Parameters below as of 15 Dec 2023 10:56  Patient On (Oxygen Delivery Method): room air    CONSTITUTIONAL: NAD, well-groomed  RESPIRATORY: Normal respiratory effort; b/l expiratory wheezes noted  CARDIOVASCULAR: Regular rate and rhythm; No lower extremity edema  ABDOMEN: Nontender to palpation, normoactive bowel sounds  PSYCH: A+O x3; affect appropriate  NEUROLOGY: CN 2-12 are intact and symmetric; no gross sensory deficits;   SKIN: No rashes; no palpable lesions    LABS:    12-15    140  |  101  |  21.6<H>  ----------------------------<  92  4.0   |  28.0  |  0.86    Ca    8.2<L>      15 Dec 2023 07:03  Phos  4.3     12-15  Mg     1.7     12-15            Urinalysis Basic - ( 15 Dec 2023 07:03 )    Color: x / Appearance: x / SG: x / pH: x  Gluc: 92 mg/dL / Ketone: x  / Bili: x / Urobili: x   Blood: x / Protein: x / Nitrite: x   Leuk Esterase: x / RBC: x / WBC x   Sq Epi: x / Non Sq Epi: x / Bacteria: x        CAPILLARY BLOOD GLUCOSE          RADIOLOGY & ADDITIONAL TESTS:  Results Reviewed:   Imaging Personally Reviewed:  Electrocardiogram Personally Reviewed:

## 2023-12-15 NOTE — PROGRESS NOTE ADULT - NS ATTEND AMEND GEN_ALL_CORE FT
Patient underwent left and right heart cath today. Cors were non obstructive. RHC showed  RA 10, RV 36/11, PA 33/24/27, PCW 29 (v35), PA sat 64.6%, Christophe CO/CI 3.82/2.07.  Patient's CI is low however his end organ function remains normal and he has some blood pressure room for GDMT ospitmization  Will increase lasix to 40mg POBID  Start aldactone 25mg daily tomorrow  Continue with losartan 25mg daily  Switch metoprolol tartrate to succinate  He is uninsured and will need to be plugged into the sliding scale clinic

## 2023-12-15 NOTE — PROGRESS NOTE ADULT - PROBLEM SELECTOR PLAN 1
ACC/AHA Stage C, NYHA class II-III symptoms  Newly diagnosed dilated cardiomyopathy (LVEF <10%, LVIDd 6.7cm, severe RV dysfunction)  [facility] with normal coronaries. Possible etiologies include Etoh abuse, Chaga's (lab pending).  Clinically euvolemic w/ warm extremities.  Preserved end organ function.  GDMT: Will start spironolactone 25 mg daily tomorrow and switch his Lopressor to Toprol XL 25 mg daily. Continue losartan 25mg daily. ARNi and SGLT2i will be cost prohibitive given the patient is uninsured.   Diuretics: Continue lasix 40mg PO daily  Low Na+, FR diet recommended.  Will arrange follow-up with our HF team in [place] prior to discharge (he should also be referred to apply for sliding scale since he has no insurance). ACC/AHA Stage C, NYHA class II-III symptoms  Newly diagnosed dilated cardiomyopathy (LVEF <10%, LVIDd 6.7cm, severe RV dysfunction)  [facility] with normal coronaries. Possible etiologies include Etoh abuse, Chaga's (lab pending).  Clinically euvolemic w/ warm extremities.  Preserved end organ function.  GDMT: Will start spironolactone 25 mg daily tomorrow and switch his Lopressor to Toprol XL 25 mg daily. Continue losartan 25mg daily. ARNi and SGLT2i will be cost prohibitive given the patient is uninsured.   Diuretics: Continue lasix 40mg PO daily  Low Na+, FR diet recommended.  Will arrange follow-up with our HF team in [place] prior to discharge (he should also be referred to apply for sliding scale since he has no insurance). ACC/AHA Stage C, NYHA class II-III symptoms  Newly diagnosed dilated cardiomyopathy (LVEF <10%, LVIDd 6.7cm, severe RV dysfunction)  [facility] with normal coronaries. Possible etiologies include Etoh abuse, Chaga's (lab pending).  Clinically euvolemic w/ warm extremities.  Preserved end organ function.  GDMT: Will start spironolactone 25 mg daily tomorrow and switch his Lopressor to Toprol XL 25 mg daily. Continue losartan 25mg daily. ARNi and SGLT2i will be cost prohibitive given the patient is uninsured.   Diuretics: Continue lasix 40mg PO BID  Low Na+, FR diet recommended.  Will arrange follow-up with our HF team in [place] prior to discharge (he should also be referred to apply for sliding scale since he has no insurance). ACC/AHA Stage C, NYHA class II-III symptoms  Newly diagnosed dilated cardiomyopathy (LVEF <10%, LVIDd 6.7cm, severe RV dysfunction)  [facility] with normal coronaries. Possible etiologies include Etoh abuse, Chaga's (lab pending).  Clinically euvolemic w/ warm extremities.  Preserved end organ function.  GDMT: Will start spironolactone 25 mg daily tomorrow and switch his Lopressor to Toprol XL 25 mg daily. Continue losartan 25mg daily. ARNi and SGLT2i will be cost prohibitive given the patient is uninsured.   Diuretics: Continue lasix 40mg PO BID  Low Na+, FR diet recommended.  Will arrange follow-up with our HF team in [place] prior to discharge (he should also be referred to apply for sliding scale since he has no insurance).

## 2023-12-15 NOTE — DISCHARGE NOTE PROVIDER - NSDCMRMEDTOKEN_GEN_ALL_CORE_FT
losartan 25 mg oral tablet: 0.5 tab(s) orally once a day  metoprolol succinate 25 mg oral tablet, extended release: 1 tab(s) orally once a day

## 2023-12-15 NOTE — PROGRESS NOTE ADULT - PROBLEM SELECTOR PLAN 1
- Echocardiogram with EF <10%, grade III DD, severely reduced RV function, mild MR, severe TR, and mild pulmonary HTN.   - Advanced Heart Failure following.  - GDMT for HFrEF  Beta Blocker: Due to ectopy, start metoprolol 12.5mg PO BID with holding parameters. Transition to Toprol XL   RAAS Inhibitor: Continue losartan 25mg PO qday, unable to add Entresto due to lack of insurance.  MRA: Initiate prior to D/C.   Diuretic: Transitioned to Lasix 40mg PO qday.  SGLT2i: Unable to add at this time due to lack of insurance.   - NPO after midnight.   - Plan for R/LHC today.

## 2023-12-15 NOTE — CONSULT NOTE ADULT - ASSESSMENT
[facility]                                              INTERVENTIONAL CARDIOLOGY AT [place] [address]                                             Telephone: [phone]. Fax:[phone]                                                       INTERVENTIONAL CARDIOLOGY CONSULTATION NOTE                                                                                             History obtained by: Patient and medical record  Community Cardiologist:   Reason for Consultation: Evaluation for cardiac catheterization  Available pt records reviewed: Yes [ x ] No [  ]    Chief complaint:    Patient is a 53y old  Male who presents with a chief complaint of Dyspnea on exertion (15 Dec 2023 09:38)      HPI:  53/M, alcohol use disorder (2-3 rum shots)/ night, last drink 12/10, no other significant pmhx presented to the ED for complaints of dyspnea on exertion which began approximately 1 week ago. Reports that he started noticing some dyspnea around a week back which progressively worsened in the past 2-3 days and was associated with cough. Reports some vague pain in the RUQ below his ribs without any hx of inciting injury or relation to food intake which began appx the same time. Pain is sporadic and is apparently better today. On direct questioning denies orthopnea, chest pain, fever or chills, leg swelling or dizziness.  (12 Dec 2023 23:38)      Anginal Class:        Angina (Class): II       Ischemic Symptoms: chest pain, BARCENAS    Heart Failure:        Systolic/Diastolic/Combined: combined       NYHA Class (within 2 weeks): III      PAST MEDICAL HISTORY  Alcohol abuse        Associated Risk Factors:        Frailty Assessment: (none/mild/mod/severe):       Cerebrovascular Disease: N/A       Chronic Lung Disease: N/A       Peripheral Arterial Disease: N/A       Chronic Kidney Disease (if yes, what is GFR): N/A       Uncontrolled Diabetes (if yes, what is HgbA1C or FBS): N/A       Poorly Controlled Hypertension (if yes, what is SBP): N/A       Morbid Obesity (if yes, what is BMI): N/A       History of Recent Ventricular Arrhythmia: N/A       Inability to Ambulate Safely: N/A       Need for Therapeutic Anticoagulation: N/A       Antiplatelet or Contrast Allergy: N/A      PAST SURGICAL HISTORY  No significant past surgical history    History of repair of laceration        SOCIAL HISTORY:  2-3 nightly drinks    FAMILY HISTORY:  No pertinent family history in first degree relatives      Family History of Premature Cardiovascular Disease:  Yes [  ] No [ x ]    HOME MEDICATIONS:      CURRENT CARDIAC MEDICATIONS:  furosemide    Tablet 40 milliGRAM(s) Oral daily  losartan 25 milliGRAM(s) Oral daily  metoprolol tartrate 12.5 milliGRAM(s) Oral every 12 hours      Antianginal Therapies:        Beta Blockers:  lopressor 12.5mg q12       Calcium Channel Blockers: NA       Long Acting Nitrates: NA       Ranexa: NA    ALLERGIES:   No Known Allergies      REVIEW OF SYMPTOMS:   CONSTITUTIONAL: o fever, no chills, no weight loss, no weight gain, no fatigue   CARDIOVASCULAR: RRR, no G/R/M, pos S1 and S2  RESPIRATORY: no Shortness of breath, no cough, no wheezing  : No dysuria, no hematuria   GI: No dark color stool, no nausea, no diarrhea, no constipation, no abdominal pain   NEURO: No headache, no slurred speech   ALL OTHER REVIEW OF SYSTEMS ARE NEGATIVE.    VITAL SIGNS:  T(C): 36.7 (12-15-23 @ 09:01), Max: 37 (12-14-23 @ 16:29)  T(F): 98 (12-15-23 @ 09:01), Max: 98.6 (12-14-23 @ 16:29)  HR: 84 (12-15-23 @ 09:01) (84 - 99)  BP: 105/69 (12-15-23 @ 09:01) (100/67 - 126/82)  RR: 19 (12-15-23 @ 09:01) (18 - 19)  SpO2: 97% (12-15-23 @ 09:01) (94% - 98%)    INTAKE AND OUTPUT:       PHYSICAL EXAM:  Constitutional: Comfortable . No acute distress.   HEENT: Atraumatic and normocephalic , neck is supple . no JVD. No carotid bruit.  CNS: A&Ox3. No focal deficits.   Respiratory: CTAB, unlabored   Cardiovascular: RRR normal s1 s2. No murmur. No rubs or gallop.  Gastrointestinal: Soft, non-tender. +Bowel sounds.   Extremities: 2+ Peripheral Pulses, No clubbing, cyanosis, or edema  Psychiatric: Calm . no agitation.   Skin: Warm and dry, no ulcers on extremities     LABS:        12-15    140  |  101  |  21.6<H>  ----------------------------<  92  4.0   |  28.0  |  0.86    Ca    8.2<L>      15 Dec 2023 07:03  Phos  4.3     12-15  Mg     1.7     12-15        Urinalysis Basic - ( 15 Dec 2023 07:03 )    Color: x / Appearance: x / SG: x / pH: x  Gluc: 92 mg/dL / Ketone: x  / Bili: x / Urobili: x   Blood: x / Protein: x / Nitrite: x   Leuk Esterase: x / RBC: x / WBC x   Sq Epi: x / Non Sq Epi: x / Bacteria: x      ECG: ST @101 BPM, RBBB, PVC  Prior ECG: Yes [  ] No [ x ]    CARDIAC TESTING   ECHO: 12/13/23:  1. The left atrium is severely dilated.   2. The left ventricular volumes are severely increased.   3. Left ventricular cavity is severely dilated. Left ventricular systolic function is severely decreased with an ejection fraction visually estimated at <10 %.   4. There is severe (grade 3) left ventricular diastolic dysfunction, with elevated filling pressure.   5. The right atrium is severely dilated in size.   6. Severely enlarged right ventricular cavity size and severely reduced systolic function.   7. Mild mitral regurgitation.   8. Tricuspid valve leaflets appear normal. Severe tricuspid regurgitation.   9. Estimated pulmonary artery systolic pressure is 46 mmHg, consistent with mild pulmonary hypertension.  10. Trace pericardial effusion      Cardiac Cath Risk Assessments:  ASA: III  Mallampati: II  Bleeding Risk:   Creatinine:  GFR:        Impression/Plan: ***yo *** with h/o ***  c/o ***  *** with ***    -plan for *** via ***  -patient seen and examined  -confirmed appropriate NPO duration  -ECG and Labs reviewed  -Aspirin 81mg po pre-cath  -NS 250mL IV bolus pre-cath***  -procedure discussed with patient; risks and benefits explained, questions answered  -consent obtained by attending IC                                                [facility]                                              INTERVENTIONAL CARDIOLOGY AT [place] [address]                                             Telephone: [phone]. Fax:[phone]                                                       INTERVENTIONAL CARDIOLOGY CONSULTATION NOTE                                                                                             History obtained by: Patient and medical record  Community Cardiologist:   Reason for Consultation: Evaluation for cardiac catheterization  Available pt records reviewed: Yes [ x ] No [  ]    Chief complaint:    Patient is a 53y old  Male who presents with a chief complaint of Dyspnea on exertion (15 Dec 2023 09:38)      HPI:  53/M, alcohol use disorder (2-3 rum shots)/ night, last drink 12/10, no other significant pmhx presented to the ED for complaints of dyspnea on exertion which began approximately 1 week ago. Reports that he started noticing some dyspnea around a week back which progressively worsened in the past 2-3 days and was associated with cough. Reports some vague pain in the RUQ below his ribs without any hx of inciting injury or relation to food intake which began appx the same time. Pain is sporadic and is apparently better today. On direct questioning denies orthopnea, chest pain, fever or chills, leg swelling or dizziness.  (12 Dec 2023 23:38)      Anginal Class:        Angina (Class): II       Ischemic Symptoms: chest pain, BARCENAS    Heart Failure:        Systolic/Diastolic/Combined: combined       NYHA Class (within 2 weeks): III      PAST MEDICAL HISTORY  Alcohol abuse        Associated Risk Factors:        Frailty Assessment: (none/mild/mod/severe):       Cerebrovascular Disease: N/A       Chronic Lung Disease: N/A       Peripheral Arterial Disease: N/A       Chronic Kidney Disease (if yes, what is GFR): N/A       Uncontrolled Diabetes (if yes, what is HgbA1C or FBS): N/A       Poorly Controlled Hypertension (if yes, what is SBP): N/A       Morbid Obesity (if yes, what is BMI): N/A       History of Recent Ventricular Arrhythmia: N/A       Inability to Ambulate Safely: N/A       Need for Therapeutic Anticoagulation: N/A       Antiplatelet or Contrast Allergy: N/A      PAST SURGICAL HISTORY  No significant past surgical history    History of repair of laceration        SOCIAL HISTORY:  2-3 nightly drinks    FAMILY HISTORY:  No pertinent family history in first degree relatives      Family History of Premature Cardiovascular Disease:  Yes [  ] No [ x ]    HOME MEDICATIONS:      CURRENT CARDIAC MEDICATIONS:  furosemide    Tablet 40 milliGRAM(s) Oral daily  losartan 25 milliGRAM(s) Oral daily  metoprolol tartrate 12.5 milliGRAM(s) Oral every 12 hours      Antianginal Therapies:        Beta Blockers:  lopressor 12.5mg q12       Calcium Channel Blockers: NA       Long Acting Nitrates: NA       Ranexa: NA    ALLERGIES:   No Known Allergies      REVIEW OF SYMPTOMS:   CONSTITUTIONAL: o fever, no chills, no weight loss, no weight gain, no fatigue   CARDIOVASCULAR: RRR, no G/R/M, pos S1 and S2  RESPIRATORY: no Shortness of breath, no cough, no wheezing  : No dysuria, no hematuria   GI: No dark color stool, no nausea, no diarrhea, no constipation, no abdominal pain   NEURO: No headache, no slurred speech   ALL OTHER REVIEW OF SYSTEMS ARE NEGATIVE.    VITAL SIGNS:  T(C): 36.7 (12-15-23 @ 09:01), Max: 37 (12-14-23 @ 16:29)  T(F): 98 (12-15-23 @ 09:01), Max: 98.6 (12-14-23 @ 16:29)  HR: 84 (12-15-23 @ 09:01) (84 - 99)  BP: 105/69 (12-15-23 @ 09:01) (100/67 - 126/82)  RR: 19 (12-15-23 @ 09:01) (18 - 19)  SpO2: 97% (12-15-23 @ 09:01) (94% - 98%)    INTAKE AND OUTPUT:       PHYSICAL EXAM:  Constitutional: Comfortable . No acute distress.   HEENT: Atraumatic and normocephalic , neck is supple . no JVD. No carotid bruit.  CNS: A&Ox3. No focal deficits.   Respiratory: CTAB, unlabored   Cardiovascular: RRR normal s1 s2. No murmur. No rubs or gallop.  Gastrointestinal: Soft, non-tender. +Bowel sounds.   Extremities: 2+ Peripheral Pulses, No clubbing, cyanosis, or edema  Psychiatric: Calm . no agitation.   Skin: Warm and dry, no ulcers on extremities     LABS:        12-15    140  |  101  |  21.6<H>  ----------------------------<  92  4.0   |  28.0  |  0.86    Ca    8.2<L>      15 Dec 2023 07:03  Phos  4.3     12-15  Mg     1.7     12-15        Urinalysis Basic - ( 15 Dec 2023 07:03 )    Color: x / Appearance: x / SG: x / pH: x  Gluc: 92 mg/dL / Ketone: x  / Bili: x / Urobili: x   Blood: x / Protein: x / Nitrite: x   Leuk Esterase: x / RBC: x / WBC x   Sq Epi: x / Non Sq Epi: x / Bacteria: x      ECG: ST @101 BPM, RBBB, PVC  Prior ECG: Yes [  ] No [ x ]    CARDIAC TESTING   ECHO: 12/13/23:  1. The left atrium is severely dilated.   2. The left ventricular volumes are severely increased.   3. Left ventricular cavity is severely dilated. Left ventricular systolic function is severely decreased with an ejection fraction visually estimated at <10 %.   4. There is severe (grade 3) left ventricular diastolic dysfunction, with elevated filling pressure.   5. The right atrium is severely dilated in size.   6. Severely enlarged right ventricular cavity size and severely reduced systolic function.   7. Mild mitral regurgitation.   8. Tricuspid valve leaflets appear normal. Severe tricuspid regurgitation.   9. Estimated pulmonary artery systolic pressure is 46 mmHg, consistent with mild pulmonary hypertension.  10. Trace pericardial effusion      Cardiac Cath Risk Assessments:  ASA: III  Mallampati: II  Bleeding Risk:   Creatinine:  GFR:        Impression/Plan: ***yo *** with h/o ***  c/o ***  *** with ***    -plan for *** via ***  -patient seen and examined  -confirmed appropriate NPO duration  -ECG and Labs reviewed  -Aspirin 81mg po pre-cath  -NS 250mL IV bolus pre-cath***  -procedure discussed with patient; risks and benefits explained, questions answered  -consent obtained by attending IC                                                [facility]                                              INTERVENTIONAL CARDIOLOGY AT [place] [address]                                             Telephone: [phone]. Fax:[phone]                                                       INTERVENTIONAL CARDIOLOGY CONSULTATION NOTE                                                                                             History obtained by: Patient and medical record  Community Cardiologist:   Reason for Consultation: Evaluation for cardiac catheterization  Available pt records reviewed: Yes [ x ] No [  ]    Chief complaint:    Patient is a 53y old  Male who presents with a chief complaint of Dyspnea on exertion (15 Dec 2023 09:38)      HPI:  53/M, alcohol use disorder (2-3 rum shots)/ night, last drink 12/10, no other significant pmhx presented to the ED for complaints of dyspnea on exertion which began approximately 1 week ago. Reports that he started noticing some dyspnea around a week back which progressively worsened in the past 2-3 days and was associated with cough. Reports some vague pain in the RUQ below his ribs without any hx of inciting injury or relation to food intake which began appx the same time. Pain is sporadic and is apparently better today. On direct questioning denies orthopnea, chest pain, fever or chills, leg swelling or dizziness.  (12 Dec 2023 23:38)      Anginal Class:        Angina (Class): II       Ischemic Symptoms: chest pain, BARCENAS    Heart Failure:        Systolic/Diastolic/Combined: combined       NYHA Class (within 2 weeks): III      PAST MEDICAL HISTORY  Alcohol abuse        Associated Risk Factors:        Frailty Assessment: (none/mild/mod/severe):       Cerebrovascular Disease: N/A       Chronic Lung Disease: N/A       Peripheral Arterial Disease: N/A       Chronic Kidney Disease (if yes, what is GFR): N/A       Uncontrolled Diabetes (if yes, what is HgbA1C or FBS): N/A       Poorly Controlled Hypertension (if yes, what is SBP): N/A       Morbid Obesity (if yes, what is BMI): N/A       History of Recent Ventricular Arrhythmia: N/A       Inability to Ambulate Safely: N/A       Need for Therapeutic Anticoagulation: N/A       Antiplatelet or Contrast Allergy: N/A      PAST SURGICAL HISTORY  No significant past surgical history    History of repair of laceration        SOCIAL HISTORY:  2-3 nightly drinks    FAMILY HISTORY:  No pertinent family history in first degree relatives      Family History of Premature Cardiovascular Disease:  Yes [  ] No [ x ]    HOME MEDICATIONS:      CURRENT CARDIAC MEDICATIONS:  furosemide    Tablet 40 milliGRAM(s) Oral daily  losartan 25 milliGRAM(s) Oral daily  metoprolol tartrate 12.5 milliGRAM(s) Oral every 12 hours      Antianginal Therapies:        Beta Blockers:  lopressor 12.5mg q12       Calcium Channel Blockers: NA       Long Acting Nitrates: NA       Ranexa: NA    ALLERGIES:   No Known Allergies      REVIEW OF SYMPTOMS:   CONSTITUTIONAL: o fever, no chills, no weight loss, no weight gain, no fatigue   CARDIOVASCULAR: RRR, no G/R/M, pos S1 and S2  RESPIRATORY: no Shortness of breath, no cough, no wheezing  : No dysuria, no hematuria   GI: No dark color stool, no nausea, no diarrhea, no constipation, no abdominal pain   NEURO: No headache, no slurred speech   ALL OTHER REVIEW OF SYSTEMS ARE NEGATIVE.    VITAL SIGNS:  T(C): 36.7 (12-15-23 @ 09:01), Max: 37 (12-14-23 @ 16:29)  T(F): 98 (12-15-23 @ 09:01), Max: 98.6 (12-14-23 @ 16:29)  HR: 84 (12-15-23 @ 09:01) (84 - 99)  BP: 105/69 (12-15-23 @ 09:01) (100/67 - 126/82)  RR: 19 (12-15-23 @ 09:01) (18 - 19)  SpO2: 97% (12-15-23 @ 09:01) (94% - 98%)    INTAKE AND OUTPUT:       PHYSICAL EXAM:  Constitutional: Comfortable . No acute distress.   HEENT: Atraumatic and normocephalic , neck is supple . no JVD. No carotid bruit.  CNS: A&Ox3. No focal deficits.   Respiratory: CTAB, unlabored   Cardiovascular: RRR normal s1 s2. No murmur. No rubs or gallop.  Gastrointestinal: Soft, non-tender. +Bowel sounds.   Extremities: 2+ Peripheral Pulses, No clubbing, cyanosis, or edema  Psychiatric: Calm . no agitation.   Skin: Warm and dry, no ulcers on extremities     LABS:        12-15    140  |  101  |  21.6<H>  ----------------------------<  92  4.0   |  28.0  |  0.86    Ca    8.2<L>      15 Dec 2023 07:03  Phos  4.3     12-15  Mg     1.7     12-15        Urinalysis Basic - ( 15 Dec 2023 07:03 )    Color: x / Appearance: x / SG: x / pH: x  Gluc: 92 mg/dL / Ketone: x  / Bili: x / Urobili: x   Blood: x / Protein: x / Nitrite: x   Leuk Esterase: x / RBC: x / WBC x   Sq Epi: x / Non Sq Epi: x / Bacteria: x      ECG: ST @101 BPM, RBBB, PVC  Prior ECG: Yes [  ] No [ x ]    CARDIAC TESTING   ECHO: 12/13/23:  1. The left atrium is severely dilated.   2. The left ventricular volumes are severely increased.   3. Left ventricular cavity is severely dilated. Left ventricular systolic function is severely decreased with an ejection fraction visually estimated at <10 %.   4. There is severe (grade 3) left ventricular diastolic dysfunction, with elevated filling pressure.   5. The right atrium is severely dilated in size.   6. Severely enlarged right ventricular cavity size and severely reduced systolic function.   7. Mild mitral regurgitation.   8. Tricuspid valve leaflets appear normal. Severe tricuspid regurgitation.   9. Estimated pulmonary artery systolic pressure is 46 mmHg, consistent with mild pulmonary hypertension.  10. Trace pericardial effusion      Cardiac Cath Risk Assessments:  ASA: III  Mallampati: II  Bleeding Risk: 0.7%  Creatinine: 0.86  GFR: 103        Impression/Plan: 53/M, alcohol use disorder (2-3 rum shots)/ night, last drink 12/10 presenting in acute newly diagnosed heart failure.     -plan for right and left heart cath via RRA and RBV  -patient received 1g mg earlier today however recommend additional 1g Mg IV as patient was at 1.7, in setting of wide RBBB and frequent ectopy would prefer serum Mg>2  -patient seen and examined  -confirmed appropriate NPO duration  -ECG and Labs reviewed  -Aspirin 81mg po pre-cath  -NS 250mL IV bolus pre-cath  -procedure discussed with patient; risks and benefits explained, questions answered  -consent obtained by attending IC                                                [facility]                                              INTERVENTIONAL CARDIOLOGY AT [place] [address]                                             Telephone: [phone]. Fax:[phone]                                                       INTERVENTIONAL CARDIOLOGY CONSULTATION NOTE                                                                                             History obtained by: Patient and medical record  Community Cardiologist:   Reason for Consultation: Evaluation for cardiac catheterization  Available pt records reviewed: Yes [ x ] No [  ]    Chief complaint:    Patient is a 53y old  Male who presents with a chief complaint of Dyspnea on exertion (15 Dec 2023 09:38)      HPI:  53/M, alcohol use disorder (2-3 rum shots)/ night, last drink 12/10, no other significant pmhx presented to the ED for complaints of dyspnea on exertion which began approximately 1 week ago. Reports that he started noticing some dyspnea around a week back which progressively worsened in the past 2-3 days and was associated with cough. Reports some vague pain in the RUQ below his ribs without any hx of inciting injury or relation to food intake which began appx the same time. Pain is sporadic and is apparently better today. On direct questioning denies orthopnea, chest pain, fever or chills, leg swelling or dizziness.  (12 Dec 2023 23:38)      Anginal Class:        Angina (Class): II       Ischemic Symptoms: chest pain, BARCENAS    Heart Failure:        Systolic/Diastolic/Combined: combined       NYHA Class (within 2 weeks): III      PAST MEDICAL HISTORY  Alcohol abuse        Associated Risk Factors:        Frailty Assessment: (none/mild/mod/severe):       Cerebrovascular Disease: N/A       Chronic Lung Disease: N/A       Peripheral Arterial Disease: N/A       Chronic Kidney Disease (if yes, what is GFR): N/A       Uncontrolled Diabetes (if yes, what is HgbA1C or FBS): N/A       Poorly Controlled Hypertension (if yes, what is SBP): N/A       Morbid Obesity (if yes, what is BMI): N/A       History of Recent Ventricular Arrhythmia: N/A       Inability to Ambulate Safely: N/A       Need for Therapeutic Anticoagulation: N/A       Antiplatelet or Contrast Allergy: N/A      PAST SURGICAL HISTORY  No significant past surgical history    History of repair of laceration        SOCIAL HISTORY:  2-3 nightly drinks    FAMILY HISTORY:  No pertinent family history in first degree relatives      Family History of Premature Cardiovascular Disease:  Yes [  ] No [ x ]    HOME MEDICATIONS:      CURRENT CARDIAC MEDICATIONS:  furosemide    Tablet 40 milliGRAM(s) Oral daily  losartan 25 milliGRAM(s) Oral daily  metoprolol tartrate 12.5 milliGRAM(s) Oral every 12 hours      Antianginal Therapies:        Beta Blockers:  lopressor 12.5mg q12       Calcium Channel Blockers: NA       Long Acting Nitrates: NA       Ranexa: NA    ALLERGIES:   No Known Allergies      REVIEW OF SYMPTOMS:   CONSTITUTIONAL: o fever, no chills, no weight loss, no weight gain, no fatigue   CARDIOVASCULAR: RRR, no G/R/M, pos S1 and S2  RESPIRATORY: no Shortness of breath, no cough, no wheezing  : No dysuria, no hematuria   GI: No dark color stool, no nausea, no diarrhea, no constipation, no abdominal pain   NEURO: No headache, no slurred speech   ALL OTHER REVIEW OF SYSTEMS ARE NEGATIVE.    VITAL SIGNS:  T(C): 36.7 (12-15-23 @ 09:01), Max: 37 (12-14-23 @ 16:29)  T(F): 98 (12-15-23 @ 09:01), Max: 98.6 (12-14-23 @ 16:29)  HR: 84 (12-15-23 @ 09:01) (84 - 99)  BP: 105/69 (12-15-23 @ 09:01) (100/67 - 126/82)  RR: 19 (12-15-23 @ 09:01) (18 - 19)  SpO2: 97% (12-15-23 @ 09:01) (94% - 98%)    INTAKE AND OUTPUT:       PHYSICAL EXAM:  Constitutional: Comfortable . No acute distress.   HEENT: Atraumatic and normocephalic , neck is supple . no JVD. No carotid bruit.  CNS: A&Ox3. No focal deficits.   Respiratory: CTAB, unlabored   Cardiovascular: RRR normal s1 s2. No murmur. No rubs or gallop.  Gastrointestinal: Soft, non-tender. +Bowel sounds.   Extremities: 2+ Peripheral Pulses, No clubbing, cyanosis, or edema  Psychiatric: Calm . no agitation.   Skin: Warm and dry, no ulcers on extremities     LABS:        12-15    140  |  101  |  21.6<H>  ----------------------------<  92  4.0   |  28.0  |  0.86    Ca    8.2<L>      15 Dec 2023 07:03  Phos  4.3     12-15  Mg     1.7     12-15        Urinalysis Basic - ( 15 Dec 2023 07:03 )    Color: x / Appearance: x / SG: x / pH: x  Gluc: 92 mg/dL / Ketone: x  / Bili: x / Urobili: x   Blood: x / Protein: x / Nitrite: x   Leuk Esterase: x / RBC: x / WBC x   Sq Epi: x / Non Sq Epi: x / Bacteria: x      ECG: ST @101 BPM, RBBB, PVC  Prior ECG: Yes [  ] No [ x ]    CARDIAC TESTING   ECHO: 12/13/23:  1. The left atrium is severely dilated.   2. The left ventricular volumes are severely increased.   3. Left ventricular cavity is severely dilated. Left ventricular systolic function is severely decreased with an ejection fraction visually estimated at <10 %.   4. There is severe (grade 3) left ventricular diastolic dysfunction, with elevated filling pressure.   5. The right atrium is severely dilated in size.   6. Severely enlarged right ventricular cavity size and severely reduced systolic function.   7. Mild mitral regurgitation.   8. Tricuspid valve leaflets appear normal. Severe tricuspid regurgitation.   9. Estimated pulmonary artery systolic pressure is 46 mmHg, consistent with mild pulmonary hypertension.  10. Trace pericardial effusion      Cardiac Cath Risk Assessments:  ASA: III  Mallampati: II  Bleeding Risk: 0.7%  Creatinine: 0.86  GFR: 103        Impression/Plan: 53/M, alcohol use disorder (2-3 rum shots)/ night, last drink 12/10 presenting in acute newly diagnosed heart failure.     -plan for right and left heart cath via RRA and RBV  -patient received 1g mg earlier today however recommend additional 1g Mg IV as patient was at 1.7, in setting of wide RBBB and frequent ectopy would prefer serum Mg>2  -patient seen and examined  -confirmed appropriate NPO duration  -ECG and Labs reviewed  -Aspirin 81mg po pre-cath  -NS 250mL IV bolus pre-cath  -procedure discussed with patient; risks and benefits explained, questions answered  -consent obtained by attending IC                                                [facility]                                              INTERVENTIONAL CARDIOLOGY AT [place] [address]                                             Telephone: [phone]. Fax:[phone]                                                       INTERVENTIONAL CARDIOLOGY CONSULTATION NOTE                                                                                             History obtained by: Patient and medical record  Community Cardiologist:   Reason for Consultation: Evaluation for cardiac catheterization  Available pt records reviewed: Yes [ x ] No [  ]    Chief complaint:    Patient is a 53y old  Male who presents with a chief complaint of Dyspnea on exertion (15 Dec 2023 09:38)      HPI:  53/M, alcohol use disorder (2-3 rum shots)/ night, last drink 12/10, no other significant pmhx presented to the ED for complaints of dyspnea on exertion which began approximately 1 week ago. Reports that he started noticing some dyspnea around a week back which progressively worsened in the past 2-3 days and was associated with cough. Reports some vague pain in the RUQ below his ribs without any hx of inciting injury or relation to food intake which began appx the same time. Pain is sporadic and is apparently better today. On direct questioning denies orthopnea, chest pain, fever or chills, leg swelling or dizziness.  (12 Dec 2023 23:38)      Anginal Class:        Angina (Class): II       Ischemic Symptoms: chest pain, BARCENAS    Heart Failure:        Systolic/Diastolic/Combined: combined       NYHA Class (within 2 weeks): III      PAST MEDICAL HISTORY  Alcohol abuse        Associated Risk Factors:        Frailty Assessment: (none/mild/mod/severe):       Cerebrovascular Disease: N/A       Chronic Lung Disease: N/A       Peripheral Arterial Disease: N/A       Chronic Kidney Disease (if yes, what is GFR): N/A       Uncontrolled Diabetes (if yes, what is HgbA1C or FBS): N/A       Poorly Controlled Hypertension (if yes, what is SBP): N/A       Morbid Obesity (if yes, what is BMI): N/A       History of Recent Ventricular Arrhythmia: N/A       Inability to Ambulate Safely: N/A       Need for Therapeutic Anticoagulation: N/A       Antiplatelet or Contrast Allergy: N/A      PAST SURGICAL HISTORY  No significant past surgical history    History of repair of laceration        SOCIAL HISTORY:  2-3 nightly drinks    FAMILY HISTORY:  No pertinent family history in first degree relatives      Family History of Premature Cardiovascular Disease:  Yes [  ] No [ x ]    HOME MEDICATIONS:      CURRENT CARDIAC MEDICATIONS:  furosemide    Tablet 40 milliGRAM(s) Oral daily  losartan 25 milliGRAM(s) Oral daily  metoprolol tartrate 12.5 milliGRAM(s) Oral every 12 hours      Antianginal Therapies:        Beta Blockers:  lopressor 12.5mg q12       Calcium Channel Blockers: NA       Long Acting Nitrates: NA       Ranexa: NA    ALLERGIES:   No Known Allergies      REVIEW OF SYMPTOMS:   CONSTITUTIONAL: o fever, no chills, no weight loss, no weight gain, no fatigue   CARDIOVASCULAR: +MILD CHEST PAIN, no palpitations  RESPIRATORY: no Shortness of breath, no cough, no wheezing  : No dysuria, no hematuria   GI: No dark color stool, no nausea, no diarrhea, no constipation, no abdominal pain   NEURO: No headache, no slurred speech   ALL OTHER REVIEW OF SYSTEMS ARE NEGATIVE.    VITAL SIGNS:  T(C): 36.7 (12-15-23 @ 09:01), Max: 37 (12-14-23 @ 16:29)  T(F): 98 (12-15-23 @ 09:01), Max: 98.6 (12-14-23 @ 16:29)  HR: 84 (12-15-23 @ 09:01) (84 - 99)  BP: 105/69 (12-15-23 @ 09:01) (100/67 - 126/82)  RR: 19 (12-15-23 @ 09:01) (18 - 19)  SpO2: 97% (12-15-23 @ 09:01) (94% - 98%)    INTAKE AND OUTPUT:       PHYSICAL EXAM:  Constitutional: Comfortable . No acute distress.   HEENT: Atraumatic and normocephalic , neck is supple . no JVD. No carotid bruit.  CNS: A&Ox3. No focal deficits.   Respiratory: CTAB, unlabored   Cardiovascular: RRR normal s1 s2. No murmur. No rubs or gallop.  Gastrointestinal: Soft, non-tender. +Bowel sounds.   Extremities: 2+ Peripheral Pulses, No clubbing, cyanosis, or edema  Psychiatric: Calm . no agitation.   Skin: Warm and dry, no ulcers on extremities     LABS:        12-15    140  |  101  |  21.6<H>  ----------------------------<  92  4.0   |  28.0  |  0.86    Ca    8.2<L>      15 Dec 2023 07:03  Phos  4.3     12-15  Mg     1.7     12-15        Urinalysis Basic - ( 15 Dec 2023 07:03 )    Color: x / Appearance: x / SG: x / pH: x  Gluc: 92 mg/dL / Ketone: x  / Bili: x / Urobili: x   Blood: x / Protein: x / Nitrite: x   Leuk Esterase: x / RBC: x / WBC x   Sq Epi: x / Non Sq Epi: x / Bacteria: x      ECG: ST @101 BPM, RBBB, PVC  Prior ECG: Yes [  ] No [ x ]    CARDIAC TESTING   ECHO: 12/13/23:  1. The left atrium is severely dilated.   2. The left ventricular volumes are severely increased.   3. Left ventricular cavity is severely dilated. Left ventricular systolic function is severely decreased with an ejection fraction visually estimated at <10 %.   4. There is severe (grade 3) left ventricular diastolic dysfunction, with elevated filling pressure.   5. The right atrium is severely dilated in size.   6. Severely enlarged right ventricular cavity size and severely reduced systolic function.   7. Mild mitral regurgitation.   8. Tricuspid valve leaflets appear normal. Severe tricuspid regurgitation.   9. Estimated pulmonary artery systolic pressure is 46 mmHg, consistent with mild pulmonary hypertension.  10. Trace pericardial effusion      Cardiac Cath Risk Assessments:  ASA: III  Mallampati: II  Bleeding Risk: 0.7%  Creatinine: 0.86  GFR: 103        Impression/Plan: 53/M, alcohol use disorder (2-3 rum shots)/ night, last drink 12/10 presenting in acute newly diagnosed heart failure.     -plan for right and left heart cath via RRA and RBV  -patient received 1g mg earlier today however recommend additional 1g Mg IV as patient was at 1.7, in setting of wide RBBB and frequent ectopy would prefer serum Mg>2  -patient seen and examined  -confirmed appropriate NPO duration  -ECG and Labs reviewed  -Aspirin 81mg po pre-cath  -NS 250mL IV bolus pre-cath  -procedure discussed with patient; risks and benefits explained, questions answered  -consent obtained by attending IC                                                [facility]                                              INTERVENTIONAL CARDIOLOGY AT [place] [address]                                             Telephone: [phone]. Fax:[phone]                                                       INTERVENTIONAL CARDIOLOGY CONSULTATION NOTE                                                                                             History obtained by: Patient and medical record  Community Cardiologist:   Reason for Consultation: Evaluation for cardiac catheterization  Available pt records reviewed: Yes [ x ] No [  ]    Chief complaint:    Patient is a 53y old  Male who presents with a chief complaint of Dyspnea on exertion (15 Dec 2023 09:38)      HPI:  53/M, alcohol use disorder (2-3 rum shots)/ night, last drink 12/10, no other significant pmhx presented to the ED for complaints of dyspnea on exertion which began approximately 1 week ago. Reports that he started noticing some dyspnea around a week back which progressively worsened in the past 2-3 days and was associated with cough. Reports some vague pain in the RUQ below his ribs without any hx of inciting injury or relation to food intake which began appx the same time. Pain is sporadic and is apparently better today. On direct questioning denies orthopnea, chest pain, fever or chills, leg swelling or dizziness.  (12 Dec 2023 23:38)      Anginal Class:        Angina (Class): II       Ischemic Symptoms: chest pain, BARCENAS    Heart Failure:        Systolic/Diastolic/Combined: combined       NYHA Class (within 2 weeks): III      PAST MEDICAL HISTORY  Alcohol abuse        Associated Risk Factors:        Frailty Assessment: (none/mild/mod/severe):       Cerebrovascular Disease: N/A       Chronic Lung Disease: N/A       Peripheral Arterial Disease: N/A       Chronic Kidney Disease (if yes, what is GFR): N/A       Uncontrolled Diabetes (if yes, what is HgbA1C or FBS): N/A       Poorly Controlled Hypertension (if yes, what is SBP): N/A       Morbid Obesity (if yes, what is BMI): N/A       History of Recent Ventricular Arrhythmia: N/A       Inability to Ambulate Safely: N/A       Need for Therapeutic Anticoagulation: N/A       Antiplatelet or Contrast Allergy: N/A      PAST SURGICAL HISTORY  No significant past surgical history    History of repair of laceration        SOCIAL HISTORY:  2-3 nightly drinks    FAMILY HISTORY:  No pertinent family history in first degree relatives      Family History of Premature Cardiovascular Disease:  Yes [  ] No [ x ]    HOME MEDICATIONS:      CURRENT CARDIAC MEDICATIONS:  furosemide    Tablet 40 milliGRAM(s) Oral daily  losartan 25 milliGRAM(s) Oral daily  metoprolol tartrate 12.5 milliGRAM(s) Oral every 12 hours      Antianginal Therapies:        Beta Blockers:  lopressor 12.5mg q12       Calcium Channel Blockers: NA       Long Acting Nitrates: NA       Ranexa: NA    ALLERGIES:   No Known Allergies      REVIEW OF SYMPTOMS:   CONSTITUTIONAL: o fever, no chills, no weight loss, no weight gain, no fatigue   CARDIOVASCULAR: +MILD CHEST PAIN, no palpitations  RESPIRATORY: no Shortness of breath, no cough, no wheezing  : No dysuria, no hematuria   GI: No dark color stool, no nausea, no diarrhea, no constipation, no abdominal pain   NEURO: No headache, no slurred speech   ALL OTHER REVIEW OF SYSTEMS ARE NEGATIVE.    VITAL SIGNS:  T(C): 36.7 (12-15-23 @ 09:01), Max: 37 (12-14-23 @ 16:29)  T(F): 98 (12-15-23 @ 09:01), Max: 98.6 (12-14-23 @ 16:29)  HR: 84 (12-15-23 @ 09:01) (84 - 99)  BP: 105/69 (12-15-23 @ 09:01) (100/67 - 126/82)  RR: 19 (12-15-23 @ 09:01) (18 - 19)  SpO2: 97% (12-15-23 @ 09:01) (94% - 98%)    INTAKE AND OUTPUT:       PHYSICAL EXAM:  Constitutional: Comfortable . No acute distress.   HEENT: Atraumatic and normocephalic , neck is supple . no JVD. No carotid bruit.  CNS: A&Ox3. No focal deficits.   Respiratory: CTAB, unlabored   Cardiovascular: RRR normal s1 s2. No murmur. No rubs or gallop.  Gastrointestinal: Soft, non-tender. +Bowel sounds.   Extremities: 2+ Peripheral Pulses, No clubbing, cyanosis, or edema  Psychiatric: Calm . no agitation.   Skin: Warm and dry, no ulcers on extremities     LABS:        12-15    140  |  101  |  21.6<H>  ----------------------------<  92  4.0   |  28.0  |  0.86    Ca    8.2<L>      15 Dec 2023 07:03  Phos  4.3     12-15  Mg     1.7     12-15        Urinalysis Basic - ( 15 Dec 2023 07:03 )    Color: x / Appearance: x / SG: x / pH: x  Gluc: 92 mg/dL / Ketone: x  / Bili: x / Urobili: x   Blood: x / Protein: x / Nitrite: x   Leuk Esterase: x / RBC: x / WBC x   Sq Epi: x / Non Sq Epi: x / Bacteria: x      ECG: ST @101 BPM, RBBB, PVC  Prior ECG: Yes [  ] No [ x ]    CARDIAC TESTING   ECHO: 12/13/23:  1. The left atrium is severely dilated.   2. The left ventricular volumes are severely increased.   3. Left ventricular cavity is severely dilated. Left ventricular systolic function is severely decreased with an ejection fraction visually estimated at <10 %.   4. There is severe (grade 3) left ventricular diastolic dysfunction, with elevated filling pressure.   5. The right atrium is severely dilated in size.   6. Severely enlarged right ventricular cavity size and severely reduced systolic function.   7. Mild mitral regurgitation.   8. Tricuspid valve leaflets appear normal. Severe tricuspid regurgitation.   9. Estimated pulmonary artery systolic pressure is 46 mmHg, consistent with mild pulmonary hypertension.  10. Trace pericardial effusion      Cardiac Cath Risk Assessments:  ASA: III  Mallampati: II  Bleeding Risk: 0.7%  Creatinine: 0.86  GFR: 103        Impression/Plan: 53/M, alcohol use disorder (2-3 rum shots)/ night, last drink 12/10 presenting in acute newly diagnosed heart failure.     -plan for right and left heart cath via RRA and RBV  -patient received 1g mg earlier today however recommend additional 1g Mg IV as patient was at 1.7, in setting of wide RBBB and frequent ectopy would prefer serum Mg>2  -patient seen and examined  -confirmed appropriate NPO duration  -ECG and Labs reviewed  -Aspirin 81mg po pre-cath  -NS 250mL IV bolus pre-cath  -procedure discussed with patient; risks and benefits explained, questions answered  -consent obtained by attending IC

## 2023-12-15 NOTE — PROGRESS NOTE ADULT - ASSESSMENT
A/P: A/P: 52 y/o obese male with PMHx of alcohol abuse (2 shots of tequila daily) presented to ED c/o  BARCENAS and PND x  8 days ago. 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 Pt also co cough since yesterday. Pt denies fever, chest pain, nausea, vomits, abdominal pain, headache, or UTI.  A/P: A/P: 54 y/o obese male with PMHx of alcohol abuse (2 shots of tequila daily) presented to ED c/o  BARCENAS and PND x  8 days ago. 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 Pt also co cough since yesterday. Pt denies fever, chest pain, nausea, vomits, abdominal pain, headache, or UTI.

## 2023-12-15 NOTE — DISCHARGE NOTE NURSING/CASE MANAGEMENT/SOCIAL WORK - NSDCFUADDAPPT_GEN_ALL_CORE_FT
[place] Appointment- Requested [place] Appointment- Requested [place] Appointment- 1/3/2024 10am at [address], [place] [id number] [place] Appointment- 1/3/2024 10am at [address], [place] [id number] [place] Appointment- 1/3/2024 10am at [address], [place] [address]     An appointment has been arranged for 1/2 at 1:45pm with AJAY Johnson. you will need to apply on his own for sliding scale;   contact # [phone].   [place] Appointment- 1/3/2024 10am at [address], [place] [address]     An appointment has been arranged for 1/2 at 1:45pm with AJAY Johnson. you will need to apply on his own for sliding scale;   contact # [phone].

## 2023-12-15 NOTE — PROGRESS NOTE ADULT - SUBJECTIVE AND OBJECTIVE BOX
[facility]                                                         CARDIOLOGY AT [address], [place] [address]                                                         Telephone: [phone]. Fax:[phone]                                                                             PROGRESS NOTE    Reason for follow up: HFrEF  Update: Spoke to the patient via [employer] [id number]. Patient states he is feeling well, no shortness of breath, A&O x 3, able to lay down flat.       Review of symptoms:   Cardiac:  No chest pain. No dyspnea. No palpitations.  Respiratory: no cough. No dyspnea  Gastrointestinal: No diarrhea. No abdominal pain. No bleeding.   Neuro: No focal neuro complaints.    Vitals:  T(C): 36.7 (12-15-23 @ 09:01), Max: 37 (12-14-23 @ 16:29)  HR: 84 (12-15-23 @ 09:01) (84 - 99)  BP: 105/69 (12-15-23 @ 09:01) (100/67 - 126/82)  RR: 19 (12-15-23 @ 09:01) (18 - 19)  SpO2: 97% (12-15-23 @ 09:01) (94% - 98%)  Wt(kg): --  I&O's Summary    Weight (kg): 79.5 (12-12 @ 14:13)    PHYSICAL EXAM:  Appearance: Comfortable. No acute distress  HEENT:  Atraumatic. Normocephalic.  Normal oral mucosa  Neurologic: A & O x 3, no gross focal deficits.  Cardiovascular: RRR S1 S2, No murmur, no rubs/gallops. No JVD  Respiratory: Trace wheezing bilaterally  Gastrointestinal:  Soft, Non-tender, + BS  Lower Extremities: 2+ Peripheral Pulses, No clubbing, cyanosis, trace LE edema  Psychiatry: Patient is calm. No agitation.   Skin: warm and dry.    CURRENT CARDIAC MEDICATIONS:  furosemide    Tablet 40 milliGRAM(s) Oral daily  losartan 25 milliGRAM(s) Oral daily  metoprolol tartrate 12.5 milliGRAM(s) Oral every 12 hours      CURRENT OTHER MEDICATIONS:  albuterol    90 MICROgram(s) HFA Inhaler 2 Puff(s) Inhalation every 6 hours  guaifenesin/dextromethorphan Oral Liquid 20 milliLiter(s) Oral four times a day PRN Cough  acetaminophen     Tablet .. 650 milliGRAM(s) Oral every 6 hours PRN Temp greater or equal to 38C (100.4F), Mild Pain (1 - 3)  chlordiazePOXIDE   Oral   chlordiazePOXIDE 50 milliGRAM(s) Oral every 12 hours  LORazepam   Injectable 2 milliGRAM(s) IV Push every 1 hour PRN Symptom-triggered: each CIWA -Ar score 8 or GREATER  melatonin 3 milliGRAM(s) Oral at bedtime PRN Insomnia  ondansetron Injectable 4 milliGRAM(s) IV Push every 8 hours PRN Nausea and/or Vomiting  aluminum hydroxide/magnesium hydroxide/simethicone Suspension 30 milliLiter(s) Oral every 4 hours PRN Dyspepsia  enoxaparin Injectable 40 milliGRAM(s) SubCutaneous every 12 hours  folic acid 1 milliGRAM(s) Oral daily  multivitamin 1 Tablet(s) Oral daily  thiamine Injectable 100 milliGRAM(s) IV Push daily, Stop order after: 3 Days      LABS:	 	        12-15    140  |  101  |  21.6<H>  ----------------------------<  92  4.0   |  28.0  |  0.86    Ca    8.2<L>      15 Dec 2023 07:03  Phos  4.3     12-15  Mg     1.7     12-15      PT/INR/PTT ( 13 Dec 2023 07:18 )                       :                       :      13.2         :       30.4                  .        .                   .              .           .       1.20        .                                       Lipid Profile: Date: 12-13 @ 07:18  Total cholesterol 89; Direct LDL: --; HDL: 39; Triglycerides:68    HgA1c:   TSH: Thyroid Stimulating Hormone, Serum: 0.56 uIU/mL      TELEMETRY: SR, PVCs, NSVT  ECG:    DIAGNOSTIC TESTING:  [ ] Echocardiogram:     < from: TTE Limited W or WO Ultrasound Enhancing Agent (12.13.23 @ 17:02) >  CONCLUSIONS:      1. The left atrium is severely dilated.   2. The left ventricular volumes are severely increased.   3. Left ventricular cavity is severely dilated. Left ventricular systolic function is severely decreased with an ejection fraction visually estimated at <10 %.   4. There is severe (grade 3) left ventricular diastolic dysfunction, with elevated filling pressure.   5. The right atrium is severely dilated in size.   6. Severely enlarged right ventricular cavity size and severely reduced systolic function.   7. Mild mitral regurgitation.   8. Tricuspid valve leaflets appear normal. Severe tricuspid regurgitation.   9. Estimated pulmonary artery systolic pressure is 46 mmHg, consistent with mild pulmonary hypertension.  10. Trace pericardial effusion.  11. Team aware of the findings.      < end of copied text >  [ ]  Catheterization:  [ ] Stress Test:    OTHER: 	                                                                [facility]                                                         CARDIOLOGY AT [address], [place] [address]                                                         Telephone: [phone]. Fax:[phone]                                                                             PROGRESS NOTE    Reason for follow up: HFrEF  Update: Spoke to the patient via [employer] [id number]. Patient states he is feeling well, no shortness of breath, A&O x 3, able to lay down flat.       Review of symptoms:   Cardiac:  No chest pain. No dyspnea. No palpitations.  Respiratory: no cough. No dyspnea  Gastrointestinal: No diarrhea. No abdominal pain. No bleeding.   Neuro: No focal neuro complaints.    Vitals:  T(C): 36.7 (12-15-23 @ 09:01), Max: 37 (12-14-23 @ 16:29)  HR: 84 (12-15-23 @ 09:01) (84 - 99)  BP: 105/69 (12-15-23 @ 09:01) (100/67 - 126/82)  RR: 19 (12-15-23 @ 09:01) (18 - 19)  SpO2: 97% (12-15-23 @ 09:01) (94% - 98%)  Wt(kg): --  I&O's Summary    Weight (kg): 79.5 (12-12 @ 14:13)    PHYSICAL EXAM:  Appearance: Comfortable. No acute distress  HEENT:  Atraumatic. Normocephalic.  Normal oral mucosa  Neurologic: A & O x 3, no gross focal deficits.  Cardiovascular: RRR S1 S2, No murmur, no rubs/gallops. No JVD  Respiratory: Trace wheezing bilaterally  Gastrointestinal:  Soft, Non-tender, + BS  Lower Extremities: 2+ Peripheral Pulses, No clubbing, cyanosis, trace LE edema  Psychiatry: Patient is calm. No agitation.   Skin: warm and dry.    CURRENT CARDIAC MEDICATIONS:  furosemide    Tablet 40 milliGRAM(s) Oral daily  losartan 25 milliGRAM(s) Oral daily  metoprolol tartrate 12.5 milliGRAM(s) Oral every 12 hours      CURRENT OTHER MEDICATIONS:  albuterol    90 MICROgram(s) HFA Inhaler 2 Puff(s) Inhalation every 6 hours  guaifenesin/dextromethorphan Oral Liquid 20 milliLiter(s) Oral four times a day PRN Cough  acetaminophen     Tablet .. 650 milliGRAM(s) Oral every 6 hours PRN Temp greater or equal to 38C (100.4F), Mild Pain (1 - 3)  chlordiazePOXIDE   Oral   chlordiazePOXIDE 50 milliGRAM(s) Oral every 12 hours  LORazepam   Injectable 2 milliGRAM(s) IV Push every 1 hour PRN Symptom-triggered: each CIWA -Ar score 8 or GREATER  melatonin 3 milliGRAM(s) Oral at bedtime PRN Insomnia  ondansetron Injectable 4 milliGRAM(s) IV Push every 8 hours PRN Nausea and/or Vomiting  aluminum hydroxide/magnesium hydroxide/simethicone Suspension 30 milliLiter(s) Oral every 4 hours PRN Dyspepsia  enoxaparin Injectable 40 milliGRAM(s) SubCutaneous every 12 hours  folic acid 1 milliGRAM(s) Oral daily  multivitamin 1 Tablet(s) Oral daily  thiamine Injectable 100 milliGRAM(s) IV Push daily, Stop order after: 3 Days      LABS:	 	        12-15    140  |  101  |  21.6<H>  ----------------------------<  92  4.0   |  28.0  |  0.86    Ca    8.2<L>      15 Dec 2023 07:03  Phos  4.3     12-15  Mg     1.7     12-15      PT/INR/PTT ( 13 Dec 2023 07:18 )                       :                       :      13.2         :       30.4                  .        .                   .              .           .       1.20        .                                       Lipid Profile: Date: 12-13 @ 07:18  Total cholesterol 89; Direct LDL: --; HDL: 39; Triglycerides:68    HgA1c:   TSH: Thyroid Stimulating Hormone, Serum: 0.56 uIU/mL      TELEMETRY: SR, PVCs, NSVT  ECG:    DIAGNOSTIC TESTING:  [ ] Echocardiogram:     < from: TTE Limited W or WO Ultrasound Enhancing Agent (12.13.23 @ 17:02) >  CONCLUSIONS:      1. The left atrium is severely dilated.   2. The left ventricular volumes are severely increased.   3. Left ventricular cavity is severely dilated. Left ventricular systolic function is severely decreased with an ejection fraction visually estimated at <10 %.   4. There is severe (grade 3) left ventricular diastolic dysfunction, with elevated filling pressure.   5. The right atrium is severely dilated in size.   6. Severely enlarged right ventricular cavity size and severely reduced systolic function.   7. Mild mitral regurgitation.   8. Tricuspid valve leaflets appear normal. Severe tricuspid regurgitation.   9. Estimated pulmonary artery systolic pressure is 46 mmHg, consistent with mild pulmonary hypertension.  10. Trace pericardial effusion.  11. Team aware of the findings.      < end of copied text >  [ ]  Catheterization:  [ ] Stress Test:    OTHER:

## 2023-12-16 LAB
ANION GAP SERPL CALC-SCNC: 12 MMOL/L — SIGNIFICANT CHANGE UP (ref 5–17)
ANION GAP SERPL CALC-SCNC: 12 MMOL/L — SIGNIFICANT CHANGE UP (ref 5–17)
BUN SERPL-MCNC: 25.3 MG/DL — HIGH (ref 8–20)
BUN SERPL-MCNC: 25.3 MG/DL — HIGH (ref 8–20)
CALCIUM SERPL-MCNC: 8.2 MG/DL — LOW (ref 8.4–10.5)
CALCIUM SERPL-MCNC: 8.2 MG/DL — LOW (ref 8.4–10.5)
CHLORIDE SERPL-SCNC: 100 MMOL/L — SIGNIFICANT CHANGE UP (ref 96–108)
CHLORIDE SERPL-SCNC: 100 MMOL/L — SIGNIFICANT CHANGE UP (ref 96–108)
CO2 SERPL-SCNC: 29 MMOL/L — SIGNIFICANT CHANGE UP (ref 22–29)
CO2 SERPL-SCNC: 29 MMOL/L — SIGNIFICANT CHANGE UP (ref 22–29)
CREAT SERPL-MCNC: 0.9 MG/DL — SIGNIFICANT CHANGE UP (ref 0.5–1.3)
CREAT SERPL-MCNC: 0.9 MG/DL — SIGNIFICANT CHANGE UP (ref 0.5–1.3)
EGFR: 101 ML/MIN/1.73M2 — SIGNIFICANT CHANGE UP
EGFR: 101 ML/MIN/1.73M2 — SIGNIFICANT CHANGE UP
GLUCOSE SERPL-MCNC: 97 MG/DL — SIGNIFICANT CHANGE UP (ref 70–99)
GLUCOSE SERPL-MCNC: 97 MG/DL — SIGNIFICANT CHANGE UP (ref 70–99)
HCT VFR BLD CALC: 47.4 % — SIGNIFICANT CHANGE UP (ref 39–50)
HCT VFR BLD CALC: 47.4 % — SIGNIFICANT CHANGE UP (ref 39–50)
HGB BLD-MCNC: 16.1 G/DL — SIGNIFICANT CHANGE UP (ref 13–17)
HGB BLD-MCNC: 16.1 G/DL — SIGNIFICANT CHANGE UP (ref 13–17)
MAGNESIUM SERPL-MCNC: 1.9 MG/DL — SIGNIFICANT CHANGE UP (ref 1.6–2.6)
MAGNESIUM SERPL-MCNC: 1.9 MG/DL — SIGNIFICANT CHANGE UP (ref 1.6–2.6)
MCHC RBC-ENTMCNC: 31.4 PG — SIGNIFICANT CHANGE UP (ref 27–34)
MCHC RBC-ENTMCNC: 31.4 PG — SIGNIFICANT CHANGE UP (ref 27–34)
MCHC RBC-ENTMCNC: 34 GM/DL — SIGNIFICANT CHANGE UP (ref 32–36)
MCHC RBC-ENTMCNC: 34 GM/DL — SIGNIFICANT CHANGE UP (ref 32–36)
MCV RBC AUTO: 92.4 FL — SIGNIFICANT CHANGE UP (ref 80–100)
MCV RBC AUTO: 92.4 FL — SIGNIFICANT CHANGE UP (ref 80–100)
PLATELET # BLD AUTO: 185 K/UL — SIGNIFICANT CHANGE UP (ref 150–400)
PLATELET # BLD AUTO: 185 K/UL — SIGNIFICANT CHANGE UP (ref 150–400)
POTASSIUM SERPL-MCNC: 4.5 MMOL/L — SIGNIFICANT CHANGE UP (ref 3.5–5.3)
POTASSIUM SERPL-MCNC: 4.5 MMOL/L — SIGNIFICANT CHANGE UP (ref 3.5–5.3)
POTASSIUM SERPL-SCNC: 4.5 MMOL/L — SIGNIFICANT CHANGE UP (ref 3.5–5.3)
POTASSIUM SERPL-SCNC: 4.5 MMOL/L — SIGNIFICANT CHANGE UP (ref 3.5–5.3)
RBC # BLD: 5.13 M/UL — SIGNIFICANT CHANGE UP (ref 4.2–5.8)
RBC # BLD: 5.13 M/UL — SIGNIFICANT CHANGE UP (ref 4.2–5.8)
RBC # FLD: 12.7 % — SIGNIFICANT CHANGE UP (ref 10.3–14.5)
RBC # FLD: 12.7 % — SIGNIFICANT CHANGE UP (ref 10.3–14.5)
SODIUM SERPL-SCNC: 141 MMOL/L — SIGNIFICANT CHANGE UP (ref 135–145)
SODIUM SERPL-SCNC: 141 MMOL/L — SIGNIFICANT CHANGE UP (ref 135–145)
T CRUZI AB SER-ACNC: SIGNIFICANT CHANGE UP
T CRUZI AB SER-ACNC: SIGNIFICANT CHANGE UP
WBC # BLD: 5.12 K/UL — SIGNIFICANT CHANGE UP (ref 3.8–10.5)
WBC # BLD: 5.12 K/UL — SIGNIFICANT CHANGE UP (ref 3.8–10.5)
WBC # FLD AUTO: 5.12 K/UL — SIGNIFICANT CHANGE UP (ref 3.8–10.5)
WBC # FLD AUTO: 5.12 K/UL — SIGNIFICANT CHANGE UP (ref 3.8–10.5)

## 2023-12-16 PROCEDURE — 99232 SBSQ HOSP IP/OBS MODERATE 35: CPT

## 2023-12-16 PROCEDURE — 99233 SBSQ HOSP IP/OBS HIGH 50: CPT

## 2023-12-16 RX ADMIN — Medication 1 TABLET(S): at 09:29

## 2023-12-16 RX ADMIN — Medication 25 MILLIGRAM(S): at 22:24

## 2023-12-16 RX ADMIN — ENOXAPARIN SODIUM 40 MILLIGRAM(S): 100 INJECTION SUBCUTANEOUS at 09:30

## 2023-12-16 RX ADMIN — Medication 650 MILLIGRAM(S): at 10:30

## 2023-12-16 RX ADMIN — Medication 50 MILLIGRAM(S): at 17:22

## 2023-12-16 RX ADMIN — Medication 1 MILLIGRAM(S): at 09:29

## 2023-12-16 RX ADMIN — Medication 650 MILLIGRAM(S): at 09:29

## 2023-12-16 RX ADMIN — ENOXAPARIN SODIUM 40 MILLIGRAM(S): 100 INJECTION SUBCUTANEOUS at 22:25

## 2023-12-16 NOTE — PROGRESS NOTE ADULT - ASSESSMENT
53/M, alcohol use disorder (2-3 rum shots)/ night, last drink 12/10, no other significant pmhx presented to the ED for complaints of dyspnea on exertion which began approximately 1 week ago. RVP- RSV +. Also found to have acute systolic heart failure, pending cardiac cath.     Chronic systolic heart failure  acute element has resolved. has not been tolerating cardiac meds due to low BP. he has not received lasix since the morning of 12/14/23. will dc it for now. losartan was also discontinued today due to low BP. started on aldactone and is on metoprolol XL. this is new diagnosis for him. TTE with EF <10%. LHC showed normal coronaries. right heart cath with elevated pressures.    RSV related URI. resolved.       Alcohol withdrawal  on CIWA and librium taper.    RUQ pain  resolved  - CMP WNL   - RUQ without acute findings    VTE prophylaxis- Lovenox    Dispo: Home pending cardiac meds optimization

## 2023-12-16 NOTE — PROGRESS NOTE ADULT - SUBJECTIVE AND OBJECTIVE BOX
[facility] Division of Hospital Medicine    Chief Complaint:      SUBJECTIVE / OVERNIGHT EVENTS:    Patient had dizziness this morning but denied any SOB.    MEDICATIONS  (STANDING):  albuterol    90 MICROgram(s) HFA Inhaler 2 Puff(s) Inhalation every 6 hours  chlordiazePOXIDE   Oral   chlordiazePOXIDE 50 milliGRAM(s) Oral every 24 hours  enoxaparin Injectable 40 milliGRAM(s) SubCutaneous every 12 hours  folic acid 1 milliGRAM(s) Oral daily  metoprolol succinate ER 25 milliGRAM(s) Oral at bedtime  multivitamin 1 Tablet(s) Oral daily  spironolactone 25 milliGRAM(s) Oral daily    MEDICATIONS  (PRN):  acetaminophen     Tablet .. 650 milliGRAM(s) Oral every 6 hours PRN Temp greater or equal to 38C (100.4F), Mild Pain (1 - 3)  aluminum hydroxide/magnesium hydroxide/simethicone Suspension 30 milliLiter(s) Oral every 4 hours PRN Dyspepsia  guaifenesin/dextromethorphan Oral Liquid 20 milliLiter(s) Oral four times a day PRN Cough  LORazepam   Injectable 2 milliGRAM(s) IV Push every 1 hour PRN Symptom-triggered: each CIWA -Ar score 8 or GREATER  melatonin 3 milliGRAM(s) Oral at bedtime PRN Insomnia  ondansetron Injectable 4 milliGRAM(s) IV Push every 8 hours PRN Nausea and/or Vomiting        I&O's Summary    16 Dec 2023 07:01  -  16 Dec 2023 15:56  --------------------------------------------------------  IN: 240 mL / OUT: 0 mL / NET: 240 mL        PHYSICAL EXAM:  Vital Signs Last 24 Hrs  T(C): 36.4 (16 Dec 2023 08:44), Max: 36.7 (15 Dec 2023 20:14)  T(F): 97.6 (16 Dec 2023 08:44), Max: 98.1 (15 Dec 2023 20:14)  HR: 87 (16 Dec 2023 12:57) (73 - 91)  BP: 96/64 (16 Dec 2023 12:57) (72/52 - 105/69)  BP(mean): --  RR: 18 (16 Dec 2023 12:57) (18 - 20)  SpO2: 94% (16 Dec 2023 12:57) (91% - 98%)    Parameters below as of 16 Dec 2023 12:57  Patient On (Oxygen Delivery Method): room air            CONSTITUTIONAL: NAD  ENMT: normocephalic, atraumatic  RESPIRATORY: Normal respiratory effort; lungs are clear to auscultation bilaterally  CARDIOVASCULAR: Regular rate and rhythm, normal S1 and S2, no murmur/rub/gallop.  ABDOMEN: Nontender to palpation, no rebound/guarding; No hepatosplenomegaly  MUSCLOSKELETAL:  No edema  PSYCH: A+O to person, place, and time; affect appropriate  NEUROLOGY: CN 2-12 are intact and symmetric; no gross deficits;   SKIN: No rashes; no palpable lesions    LABS:                        16.1   5.12  )-----------( 185      ( 16 Dec 2023 07:32 )             47.4     12-16    141  |  100  |  25.3<H>  ----------------------------<  97  4.5   |  29.0  |  0.90    Ca    8.2<L>      16 Dec 2023 07:32  Phos  4.3     12-15  Mg     1.9     12-16            Urinalysis Basic - ( 16 Dec 2023 07:32 )    Color: x / Appearance: x / SG: x / pH: x  Gluc: 97 mg/dL / Ketone: x  / Bili: x / Urobili: x   Blood: x / Protein: x / Nitrite: x   Leuk Esterase: x / RBC: x / WBC x   Sq Epi: x / Non Sq Epi: x / Bacteria: x        CAPILLARY BLOOD GLUCOSE            RADIOLOGY & ADDITIONAL TESTS:  Results Reviewed:   Imaging Personally Reviewed:  Electrocardiogram Personally Reviewed:                                           [facility] Division of Hospital Medicine    Chief Complaint:      SUBJECTIVE / OVERNIGHT EVENTS:    Patient had dizziness this morning but denied any SOB.    MEDICATIONS  (STANDING):  albuterol    90 MICROgram(s) HFA Inhaler 2 Puff(s) Inhalation every 6 hours  chlordiazePOXIDE   Oral   chlordiazePOXIDE 50 milliGRAM(s) Oral every 24 hours  enoxaparin Injectable 40 milliGRAM(s) SubCutaneous every 12 hours  folic acid 1 milliGRAM(s) Oral daily  metoprolol succinate ER 25 milliGRAM(s) Oral at bedtime  multivitamin 1 Tablet(s) Oral daily  spironolactone 25 milliGRAM(s) Oral daily    MEDICATIONS  (PRN):  acetaminophen     Tablet .. 650 milliGRAM(s) Oral every 6 hours PRN Temp greater or equal to 38C (100.4F), Mild Pain (1 - 3)  aluminum hydroxide/magnesium hydroxide/simethicone Suspension 30 milliLiter(s) Oral every 4 hours PRN Dyspepsia  guaifenesin/dextromethorphan Oral Liquid 20 milliLiter(s) Oral four times a day PRN Cough  LORazepam   Injectable 2 milliGRAM(s) IV Push every 1 hour PRN Symptom-triggered: each CIWA -Ar score 8 or GREATER  melatonin 3 milliGRAM(s) Oral at bedtime PRN Insomnia  ondansetron Injectable 4 milliGRAM(s) IV Push every 8 hours PRN Nausea and/or Vomiting        I&O's Summary    16 Dec 2023 07:01  -  16 Dec 2023 15:56  --------------------------------------------------------  IN: 240 mL / OUT: 0 mL / NET: 240 mL        PHYSICAL EXAM:  Vital Signs Last 24 Hrs  T(C): 36.4 (16 Dec 2023 08:44), Max: 36.7 (15 Dec 2023 20:14)  T(F): 97.6 (16 Dec 2023 08:44), Max: 98.1 (15 Dec 2023 20:14)  HR: 87 (16 Dec 2023 12:57) (73 - 91)  BP: 96/64 (16 Dec 2023 12:57) (72/52 - 105/69)  BP(mean): --  RR: 18 (16 Dec 2023 12:57) (18 - 20)  SpO2: 94% (16 Dec 2023 12:57) (91% - 98%)    Parameters below as of 16 Dec 2023 12:57  Patient On (Oxygen Delivery Method): room air            CONSTITUTIONAL: NAD  ENMT: normocephalic, atraumatic  RESPIRATORY: Normal respiratory effort; lungs are clear to auscultation bilaterally  CARDIOVASCULAR: Regular rate and rhythm, normal S1 and S2, no murmur/rub/gallop.  ABDOMEN: Nontender to palpation, no rebound/guarding; No hepatosplenomegaly  MUSCLOSKELETAL:  No edema  PSYCH: A+O to person, place, and time; affect appropriate  NEUROLOGY: CN 2-12 are intact and symmetric; no gross deficits;   SKIN: No rashes; no palpable lesions    LABS:                        16.1   5.12  )-----------( 185      ( 16 Dec 2023 07:32 )             47.4     12-16    141  |  100  |  25.3<H>  ----------------------------<  97  4.5   |  29.0  |  0.90    Ca    8.2<L>      16 Dec 2023 07:32  Phos  4.3     12-15  Mg     1.9     12-16            Urinalysis Basic - ( 16 Dec 2023 07:32 )    Color: x / Appearance: x / SG: x / pH: x  Gluc: 97 mg/dL / Ketone: x  / Bili: x / Urobili: x   Blood: x / Protein: x / Nitrite: x   Leuk Esterase: x / RBC: x / WBC x   Sq Epi: x / Non Sq Epi: x / Bacteria: x        CAPILLARY BLOOD GLUCOSE            RADIOLOGY & ADDITIONAL TESTS:  Results Reviewed:   Imaging Personally Reviewed:  Electrocardiogram Personally Reviewed:

## 2023-12-16 NOTE — PROGRESS NOTE ADULT - ASSESSMENT
53M PMHx of alcohol abuse (2 shots of tequila daily) presented to ED c/o  BARCENAS and PND x  8 days ago. 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 Pt also co cough since yesterday. Pt denies fever, chest pain, nausea, vomits, abdominal pain, headache, or UTI. trop x 3 negative; proBNP 7690; RVP+ RSV; CXR with clear lungs; LVEF <10%; severe biventricular failure; mild MR: severe TR; trace pericardial effusion. Heart failure following; GDMT started: Lasix 40mg PO BID; Aldactone 25mg PO daily; Losartan 25mg PO daily; Toprol 25mg PO daily. s/p R/LHC yesterday 12/15 revealing: normal coronary arteries; elevation of right heart pressures (RA=10 mmHg; RV=36/11 mmHg; PCWP=29 mmHg; PVR=0 RAMIREZ; CO/CI 3.8/2.1).         53M PMHx of alcohol abuse (2 shots of tequila daily) presented to ED c/o  BARCNEAS and PND x  8 days ago. 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 Pt also co cough since yesterday. Pt denies fever, chest pain, nausea, vomits, abdominal pain, headache, or UTI. trop x 3 negative; proBNP 7690; RVP+ RSV; CXR with clear lungs; LVEF <10%; severe biventricular failure; mild MR: severe TR; trace pericardial effusion. Heart failure following; GDMT started: Lasix 40mg PO BID; Aldactone 25mg PO daily; Losartan 25mg PO daily; Toprol 25mg PO daily. s/p R/LHC yesterday 12/15 revealing: normal coronary arteries; elevation of right heart pressures (RA=10 mmHg; RV=36/11 mmHg; PCWP=29 mmHg; PVR=0 RAMIREZ; CO/CI 3.8/2.1).

## 2023-12-16 NOTE — PROGRESS NOTE ADULT - PROBLEM SELECTOR PLAN 1
-Echocardiogram with EF <10%, grade III DD, severely reduced RV function, mild MR, severe TR, and mild pulmonary HTN.   -trop x3 negative. proBNP 7690  - Advanced Heart Failure following.  -s/p R/LHC yesterday 12/15 revealing:  revealing: normal coronary arteries; elevation of right heart pressures (RA=10 mmHg; RV=36/11 mmHg; PCWP=29 mmHg; PVR=0 RAMIREZ; CO/CI 3.8/2.1).   - GDMT for HFrEF  Beta Blocker: Toprol XL 25mg PO daily  RAAS Inhibitor: Losartan discontinued due to episode of hypotension and dizziness this AM.   MRA: Spironolactone 25mg PO daily  Diuretic: Lasix 40mg PO BID  SGLT2i: Unable to add at this time due to lack of insurance.   -chagas w/u pending (sent)

## 2023-12-16 NOTE — PROGRESS NOTE ADULT - NS ATTEND AMEND GEN_ALL_CORE FT
53M with PMH ETOH dependence admitted with acute HFrEF, newly diagnosed.  pBNP 7600s, EKG with bifascicular block. Troponin negative.  -LVEF <10%, severe biventricular failure  -S/p LHC/RHC with normal coronaries, elevated filling pressures, PCWP 29  -Lasix increased to 40mg po bid  -HF likely related to ETOH use, however labs for causes of NICM in progress. HIV/hepatitis negative, ferritin normal, trypanosoma pending  -Advanced HF team following   -Patient with hypotension overnight; losartan held. Continue beta blocker, aldactone.   -GDMT will be limited as patient is uninsured

## 2023-12-16 NOTE — PROGRESS NOTE ADULT - SUBJECTIVE AND OBJECTIVE BOX
[place] CARDIOLOGY-[facility]                                                               Office: [address] [address]                                                              Telephone: [phone]. Fax:[phone]                                                                             PROGRESS NOTE  Reason for follow up: HFrEF  Overnight: No new events.   Update: s/p R/HC yesterday 12/15 via RRA and RBC revealing: coronary arteries with no disease and mild elevation of Right Heart Pressures (RA=10mmHg; RV: 36/11; PCWP: 29; CO/CI: 3.8/2.1). Lasix, Losartan and Aldactone were held at 5 AM for SBP 94/61.  Around 8pm, patient was noted to be 72/52. repeat BP 90/63.     Subjective: " I felt dizzy this AM and i couldn't walk. My breathing is better since i came in." Language Lines  Nba ID [id number] used for Amharic interpretation.     Review of symptoms:   Cardiac:  No chest pain. No dyspnea. No palpitations.  Respiratory: +nonproductive cough, patient reports improved breathing since admission  Gastrointestinal: No diarrhea. No abdominal pain. No bleeding.   Neuro: No focal neuro complaints.      Vitals:  T(C): 36.4 (12-16-23 @ 08:44), Max: 36.7 (12-15-23 @ 20:14)  HR: 91 (12-16-23 @ 08:44) (73 - 91)  BP: 90/63 (12-16-23 @ 08:47) (72/52 - 105/69)  RR: 20 (12-16-23 @ 08:44) (14 - 28)  SpO2: 91% (12-16-23 @ 08:44) (91% - 98%)  Wt(kg): --  I&O's Summary    Weight (kg): 79.5 (12-12 @ 14:13)      PHYSICAL EXAM:  Appearance: Laying in bed, no apparent distress  HEENT:  Atraumatic. Normocephalic.  Normal oral mucosa, PERRL, Neck is supple. No carotid bruit.   Neurologic: A & O x 3, no focal deficits. EOMI. left eye with redness.   Cardiovascular: Normal S1 S2, No murmur, rubs/gallops. No JVD, trace b/l LE edema  Respiratory: Lungs clear to auscultation, unlabored   Gastrointestinal:  Soft, Non-tender, + BS  Lower Extremities: trace b/l LE edema  Psychiatry: Patient is calm. No agitation. Mood & affect appropriate  Skin: No rashes/ ecchymoses/cyanosis/ulcers visualized on the face, hands or feet.      CURRENT MEDICATIONS:  furosemide    Tablet 40 milliGRAM(s) Oral two times a day  losartan 25 milliGRAM(s) Oral daily  metoprolol succinate ER 25 milliGRAM(s) Oral at bedtime  spironolactone 25 milliGRAM(s) Oral daily    albuterol    90 MICROgram(s) HFA Inhaler  chlordiazePOXIDE  chlordiazePOXIDE  enoxaparin Injectable  folic acid  multivitamin      DIAGNOSTIC TESTING:  [X ] Echocardiogram: < from: TTE Limited W or WO Ultrasound Enhancing Agent (12.13.23 @ 17:02) >  CONCLUSIONS:      1. The left atrium is severely dilated.   2. The left ventricular volumes are severely increased.   3. Left ventricular cavity is severely dilated. Left ventricular systolic function is severely decreased with an ejection fraction visually estimated at <10 %.   4. There is severe (grade 3) left ventricular diastolic dysfunction, with elevated filling pressure.   5. The right atrium is severely dilated in size.   6. Severely enlarged right ventricular cavity size and severely reduced systolic function.   7. Mild mitral regurgitation.   8. Tricuspid valve leaflets appear normal. Severe tricuspid regurgitation.   9. Estimated pulmonary artery systolic pressure is 46 mmHg, consistent with mild pulmonary hypertension.  10. Trace pericardial effusion.  11. Team aware of the findings.    < end of copied text >    [X ]  Catheterization:  < from: Cardiac Catheterization (12.15.23 @ 12:13) >  Diagnostic Conclusions:     Normal Coronary Arteries   Severely Reduced LV Function (LVEF=10% and LVEDP=25 mmHg)   Mild Elevation of Right Heart Pressures:   RA=10 mmHg; RV=36/11 mmHg; PCWP=29 mmHg; PVR=0 RAMIREZ   Mild Isolated Post-capillary Pulmonary Hypertension=33/24=27 mmHg   Reduced CO=3.8 L/min and CI=2.1 L/min/m2   Reduced LEE=0.9   Reduced =0.7W     < end of copied text >  LABS:	 	                       16.1   5.12  )-----------( 185      ( 16 Dec 2023 07:32 )             47.4     12-16    141  |  100  |  25.3<H>  ----------------------------<  97  4.5   |  29.0  |  0.90    Ca    8.2<L>      16 Dec 2023 07:32  Phos  4.3     12-15  Mg     1.9     12-16      proBNP:   Lipid Profile: Date: 12-13 @ 07:18  Total cholesterol 89; Direct LDL: --; HDL: 39; Triglycerides:68    HgA1c:   TSH: Thyroid Stimulating Hormone, Serum: 0.56 uIU/mL        TELEMETRY: Reviewed  with multiforme PVCs  ECG:  Reviewed by me. 	                                                                      [place] CARDIOLOGY-[facility]                                                               Office: [address] [address]                                                              Telephone: [phone]. Fax:[phone]                                                                             PROGRESS NOTE  Reason for follow up: HFrEF  Overnight: No new events.   Update: s/p R/HC yesterday 12/15 via RRA and RBC revealing: coronary arteries with no disease and mild elevation of Right Heart Pressures (RA=10mmHg; RV: 36/11; PCWP: 29; CO/CI: 3.8/2.1). Lasix, Losartan and Aldactone were held at 5 AM for SBP 94/61.  Around 8pm, patient was noted to be 72/52. repeat BP 90/63.     Subjective: " I felt dizzy this AM and i couldn't walk. My breathing is better since i came in." Language Lines  Nba ID [id number] used for Yoruba interpretation.     Review of symptoms:   Cardiac:  No chest pain. No dyspnea. No palpitations.  Respiratory: +nonproductive cough, patient reports improved breathing since admission  Gastrointestinal: No diarrhea. No abdominal pain. No bleeding.   Neuro: No focal neuro complaints.      Vitals:  T(C): 36.4 (12-16-23 @ 08:44), Max: 36.7 (12-15-23 @ 20:14)  HR: 91 (12-16-23 @ 08:44) (73 - 91)  BP: 90/63 (12-16-23 @ 08:47) (72/52 - 105/69)  RR: 20 (12-16-23 @ 08:44) (14 - 28)  SpO2: 91% (12-16-23 @ 08:44) (91% - 98%)  Wt(kg): --  I&O's Summary    Weight (kg): 79.5 (12-12 @ 14:13)      PHYSICAL EXAM:  Appearance: Laying in bed, no apparent distress  HEENT:  Atraumatic. Normocephalic.  Normal oral mucosa, PERRL, Neck is supple. No carotid bruit.   Neurologic: A & O x 3, no focal deficits. EOMI. left eye with redness.   Cardiovascular: Normal S1 S2, No murmur, rubs/gallops. No JVD, trace b/l LE edema  Respiratory: Lungs clear to auscultation, unlabored   Gastrointestinal:  Soft, Non-tender, + BS  Lower Extremities: trace b/l LE edema  Psychiatry: Patient is calm. No agitation. Mood & affect appropriate  Skin: No rashes/ ecchymoses/cyanosis/ulcers visualized on the face, hands or feet.      CURRENT MEDICATIONS:  furosemide    Tablet 40 milliGRAM(s) Oral two times a day  losartan 25 milliGRAM(s) Oral daily  metoprolol succinate ER 25 milliGRAM(s) Oral at bedtime  spironolactone 25 milliGRAM(s) Oral daily    albuterol    90 MICROgram(s) HFA Inhaler  chlordiazePOXIDE  chlordiazePOXIDE  enoxaparin Injectable  folic acid  multivitamin      DIAGNOSTIC TESTING:  [X ] Echocardiogram: < from: TTE Limited W or WO Ultrasound Enhancing Agent (12.13.23 @ 17:02) >  CONCLUSIONS:      1. The left atrium is severely dilated.   2. The left ventricular volumes are severely increased.   3. Left ventricular cavity is severely dilated. Left ventricular systolic function is severely decreased with an ejection fraction visually estimated at <10 %.   4. There is severe (grade 3) left ventricular diastolic dysfunction, with elevated filling pressure.   5. The right atrium is severely dilated in size.   6. Severely enlarged right ventricular cavity size and severely reduced systolic function.   7. Mild mitral regurgitation.   8. Tricuspid valve leaflets appear normal. Severe tricuspid regurgitation.   9. Estimated pulmonary artery systolic pressure is 46 mmHg, consistent with mild pulmonary hypertension.  10. Trace pericardial effusion.  11. Team aware of the findings.    < end of copied text >    [X ]  Catheterization:  < from: Cardiac Catheterization (12.15.23 @ 12:13) >  Diagnostic Conclusions:     Normal Coronary Arteries   Severely Reduced LV Function (LVEF=10% and LVEDP=25 mmHg)   Mild Elevation of Right Heart Pressures:   RA=10 mmHg; RV=36/11 mmHg; PCWP=29 mmHg; PVR=0 RAMIREZ   Mild Isolated Post-capillary Pulmonary Hypertension=33/24=27 mmHg   Reduced CO=3.8 L/min and CI=2.1 L/min/m2   Reduced LEE=0.9   Reduced =0.7W     < end of copied text >  LABS:	 	                       16.1   5.12  )-----------( 185      ( 16 Dec 2023 07:32 )             47.4     12-16    141  |  100  |  25.3<H>  ----------------------------<  97  4.5   |  29.0  |  0.90    Ca    8.2<L>      16 Dec 2023 07:32  Phos  4.3     12-15  Mg     1.9     12-16      proBNP:   Lipid Profile: Date: 12-13 @ 07:18  Total cholesterol 89; Direct LDL: --; HDL: 39; Triglycerides:68    HgA1c:   TSH: Thyroid Stimulating Hormone, Serum: 0.56 uIU/mL        TELEMETRY: Reviewed  with multiforme PVCs  ECG:  Reviewed by me. 	                                                                      [place] CARDIOLOGY-[facility]                                                               Office: [address] [address]                                                              Telephone: [phone]. Fax:[phone]                                                                             PROGRESS NOTE  Reason for follow up: HFrEF  Overnight: No new events.   Update: s/p R/HC yesterday 12/15 via RRA and RBC revealing: coronary arteries with no disease and mild elevation of Right Heart Pressures (RA=10mmHg; RV: 36/11; PCWP: 29; CO/CI: 3.8/2.1). Lasix, Losartan and Aldactone were held at 5 AM for SBP 94/61.  Around 8pm, patient was noted to be 72/52. repeat BP 90/63.     Subjective: " I felt dizzy this AM and i couldn't walk. My breathing is better since i came in." Language Lines  Nba ID [id number] used for Bulgarian interpretation.     Review of symptoms:   Cardiac:  No chest pain. No dyspnea. No palpitations.  Respiratory: +nonproductive cough, patient reports improved breathing since admission  Gastrointestinal: No diarrhea. No abdominal pain. No bleeding.   Neuro: No focal neuro complaints.      Vitals:  T(C): 36.4 (12-16-23 @ 08:44), Max: 36.7 (12-15-23 @ 20:14)  HR: 91 (12-16-23 @ 08:44) (73 - 91)  BP: 90/63 (12-16-23 @ 08:47) (72/52 - 105/69)  RR: 20 (12-16-23 @ 08:44) (14 - 28)  SpO2: 91% (12-16-23 @ 08:44) (91% - 98%)  Wt(kg): --  I&O's Summary    Weight (kg): 79.5 (12-12 @ 14:13)      PHYSICAL EXAM:  Appearance: Laying in bed, no apparent distress  HEENT:  Atraumatic. Normocephalic.  Normal oral mucosa, PERRL, Neck is supple. No carotid bruit.   Neurologic: A & O x 3, no focal deficits. EOMI. left eye with redness.   Cardiovascular: Normal S1 S2, No murmur, rubs/gallops. No JVD, trace b/l LE edema  Respiratory: Lungs clear to auscultation, unlabored   Gastrointestinal:  Soft, Non-tender, + BS  Lower Extremities: trace b/l LE edema  Psychiatry: Patient is calm. No agitation. Mood & affect appropriate  Skin: No rashes/ ecchymoses/cyanosis/ulcers visualized on the face, hands or feet.. RRA/RBV site c/d/i without active bleeding or hematoma. Site soft, no ecchymosis, nontender. RUE/ Right hand remains acyanotic; warm to touch; motor/sensory function intact; 1+ right radial pulse.       CURRENT MEDICATIONS:  furosemide    Tablet 40 milliGRAM(s) Oral two times a day  losartan 25 milliGRAM(s) Oral daily  metoprolol succinate ER 25 milliGRAM(s) Oral at bedtime  spironolactone 25 milliGRAM(s) Oral daily    albuterol    90 MICROgram(s) HFA Inhaler  chlordiazePOXIDE  chlordiazePOXIDE  enoxaparin Injectable  folic acid  multivitamin      DIAGNOSTIC TESTING:  [X ] Echocardiogram: < from: TTE Limited W or WO Ultrasound Enhancing Agent (12.13.23 @ 17:02) >  CONCLUSIONS:      1. The left atrium is severely dilated.   2. The left ventricular volumes are severely increased.   3. Left ventricular cavity is severely dilated. Left ventricular systolic function is severely decreased with an ejection fraction visually estimated at <10 %.   4. There is severe (grade 3) left ventricular diastolic dysfunction, with elevated filling pressure.   5. The right atrium is severely dilated in size.   6. Severely enlarged right ventricular cavity size and severely reduced systolic function.   7. Mild mitral regurgitation.   8. Tricuspid valve leaflets appear normal. Severe tricuspid regurgitation.   9. Estimated pulmonary artery systolic pressure is 46 mmHg, consistent with mild pulmonary hypertension.  10. Trace pericardial effusion.  11. Team aware of the findings.    < end of copied text >    [X ]  Catheterization:  < from: Cardiac Catheterization (12.15.23 @ 12:13) >  Diagnostic Conclusions:     Normal Coronary Arteries   Severely Reduced LV Function (LVEF=10% and LVEDP=25 mmHg)   Mild Elevation of Right Heart Pressures:   RA=10 mmHg; RV=36/11 mmHg; PCWP=29 mmHg; PVR=0 RAMIREZ   Mild Isolated Post-capillary Pulmonary Hypertension=33/24=27 mmHg   Reduced CO=3.8 L/min and CI=2.1 L/min/m2   Reduced LEE=0.9   Reduced =0.7W     < end of copied text >  LABS:	 	                       16.1   5.12  )-----------( 185      ( 16 Dec 2023 07:32 )             47.4     12-16    141  |  100  |  25.3<H>  ----------------------------<  97  4.5   |  29.0  |  0.90    Ca    8.2<L>      16 Dec 2023 07:32  Phos  4.3     12-15  Mg     1.9     12-16      proBNP:   Lipid Profile: Date: 12-13 @ 07:18  Total cholesterol 89; Direct LDL: --; HDL: 39; Triglycerides:68    HgA1c:   TSH: Thyroid Stimulating Hormone, Serum: 0.56 uIU/mL        TELEMETRY: Reviewed  with multiforme PVCs  ECG:  Reviewed by me. 	                                                                      [place] CARDIOLOGY-[facility]                                                               Office: [address] [address]                                                              Telephone: [phone]. Fax:[phone]                                                                             PROGRESS NOTE  Reason for follow up: HFrEF  Overnight: No new events.   Update: s/p R/HC yesterday 12/15 via RRA and RBC revealing: coronary arteries with no disease and mild elevation of Right Heart Pressures (RA=10mmHg; RV: 36/11; PCWP: 29; CO/CI: 3.8/2.1). Lasix, Losartan and Aldactone were held at 5 AM for SBP 94/61.  Around 8pm, patient was noted to be 72/52. repeat BP 90/63.     Subjective: " I felt dizzy this AM and i couldn't walk. My breathing is better since i came in." Language Lines  Nba ID [id number] used for Indonesian interpretation.     Review of symptoms:   Cardiac:  No chest pain. No dyspnea. No palpitations.  Respiratory: +nonproductive cough, patient reports improved breathing since admission  Gastrointestinal: No diarrhea. No abdominal pain. No bleeding.   Neuro: No focal neuro complaints.      Vitals:  T(C): 36.4 (12-16-23 @ 08:44), Max: 36.7 (12-15-23 @ 20:14)  HR: 91 (12-16-23 @ 08:44) (73 - 91)  BP: 90/63 (12-16-23 @ 08:47) (72/52 - 105/69)  RR: 20 (12-16-23 @ 08:44) (14 - 28)  SpO2: 91% (12-16-23 @ 08:44) (91% - 98%)  Wt(kg): --  I&O's Summary    Weight (kg): 79.5 (12-12 @ 14:13)      PHYSICAL EXAM:  Appearance: Laying in bed, no apparent distress  HEENT:  Atraumatic. Normocephalic.  Normal oral mucosa, PERRL, Neck is supple. No carotid bruit.   Neurologic: A & O x 3, no focal deficits. EOMI. left eye with redness.   Cardiovascular: Normal S1 S2, No murmur, rubs/gallops. No JVD, trace b/l LE edema  Respiratory: Lungs clear to auscultation, unlabored   Gastrointestinal:  Soft, Non-tender, + BS  Lower Extremities: trace b/l LE edema  Psychiatry: Patient is calm. No agitation. Mood & affect appropriate  Skin: No rashes/ ecchymoses/cyanosis/ulcers visualized on the face, hands or feet.. RRA/RBV site c/d/i without active bleeding or hematoma. Site soft, no ecchymosis, nontender. RUE/ Right hand remains acyanotic; warm to touch; motor/sensory function intact; 1+ right radial pulse.       CURRENT MEDICATIONS:  furosemide    Tablet 40 milliGRAM(s) Oral two times a day  losartan 25 milliGRAM(s) Oral daily  metoprolol succinate ER 25 milliGRAM(s) Oral at bedtime  spironolactone 25 milliGRAM(s) Oral daily    albuterol    90 MICROgram(s) HFA Inhaler  chlordiazePOXIDE  chlordiazePOXIDE  enoxaparin Injectable  folic acid  multivitamin      DIAGNOSTIC TESTING:  [X ] Echocardiogram: < from: TTE Limited W or WO Ultrasound Enhancing Agent (12.13.23 @ 17:02) >  CONCLUSIONS:      1. The left atrium is severely dilated.   2. The left ventricular volumes are severely increased.   3. Left ventricular cavity is severely dilated. Left ventricular systolic function is severely decreased with an ejection fraction visually estimated at <10 %.   4. There is severe (grade 3) left ventricular diastolic dysfunction, with elevated filling pressure.   5. The right atrium is severely dilated in size.   6. Severely enlarged right ventricular cavity size and severely reduced systolic function.   7. Mild mitral regurgitation.   8. Tricuspid valve leaflets appear normal. Severe tricuspid regurgitation.   9. Estimated pulmonary artery systolic pressure is 46 mmHg, consistent with mild pulmonary hypertension.  10. Trace pericardial effusion.  11. Team aware of the findings.    < end of copied text >    [X ]  Catheterization:  < from: Cardiac Catheterization (12.15.23 @ 12:13) >  Diagnostic Conclusions:     Normal Coronary Arteries   Severely Reduced LV Function (LVEF=10% and LVEDP=25 mmHg)   Mild Elevation of Right Heart Pressures:   RA=10 mmHg; RV=36/11 mmHg; PCWP=29 mmHg; PVR=0 RAMIREZ   Mild Isolated Post-capillary Pulmonary Hypertension=33/24=27 mmHg   Reduced CO=3.8 L/min and CI=2.1 L/min/m2   Reduced LEE=0.9   Reduced =0.7W     < end of copied text >  LABS:	 	                       16.1   5.12  )-----------( 185      ( 16 Dec 2023 07:32 )             47.4     12-16    141  |  100  |  25.3<H>  ----------------------------<  97  4.5   |  29.0  |  0.90    Ca    8.2<L>      16 Dec 2023 07:32  Phos  4.3     12-15  Mg     1.9     12-16      proBNP:   Lipid Profile: Date: 12-13 @ 07:18  Total cholesterol 89; Direct LDL: --; HDL: 39; Triglycerides:68    HgA1c:   TSH: Thyroid Stimulating Hormone, Serum: 0.56 uIU/mL        TELEMETRY: Reviewed  with multiforme PVCs  ECG:  Reviewed by me.

## 2023-12-17 DIAGNOSIS — I48.92 UNSPECIFIED ATRIAL FLUTTER: ICD-10-CM

## 2023-12-17 LAB
ANION GAP SERPL CALC-SCNC: 9 MMOL/L — SIGNIFICANT CHANGE UP (ref 5–17)
ANION GAP SERPL CALC-SCNC: 9 MMOL/L — SIGNIFICANT CHANGE UP (ref 5–17)
BUN SERPL-MCNC: 17.6 MG/DL — SIGNIFICANT CHANGE UP (ref 8–20)
BUN SERPL-MCNC: 17.6 MG/DL — SIGNIFICANT CHANGE UP (ref 8–20)
CALCIUM SERPL-MCNC: 8.3 MG/DL — LOW (ref 8.4–10.5)
CALCIUM SERPL-MCNC: 8.3 MG/DL — LOW (ref 8.4–10.5)
CHLORIDE SERPL-SCNC: 102 MMOL/L — SIGNIFICANT CHANGE UP (ref 96–108)
CHLORIDE SERPL-SCNC: 102 MMOL/L — SIGNIFICANT CHANGE UP (ref 96–108)
CO2 SERPL-SCNC: 27 MMOL/L — SIGNIFICANT CHANGE UP (ref 22–29)
CO2 SERPL-SCNC: 27 MMOL/L — SIGNIFICANT CHANGE UP (ref 22–29)
CREAT SERPL-MCNC: 0.81 MG/DL — SIGNIFICANT CHANGE UP (ref 0.5–1.3)
CREAT SERPL-MCNC: 0.81 MG/DL — SIGNIFICANT CHANGE UP (ref 0.5–1.3)
EGFR: 105 ML/MIN/1.73M2 — SIGNIFICANT CHANGE UP
EGFR: 105 ML/MIN/1.73M2 — SIGNIFICANT CHANGE UP
GLUCOSE SERPL-MCNC: 103 MG/DL — HIGH (ref 70–99)
GLUCOSE SERPL-MCNC: 103 MG/DL — HIGH (ref 70–99)
POTASSIUM SERPL-MCNC: 4.3 MMOL/L — SIGNIFICANT CHANGE UP (ref 3.5–5.3)
POTASSIUM SERPL-MCNC: 4.3 MMOL/L — SIGNIFICANT CHANGE UP (ref 3.5–5.3)
POTASSIUM SERPL-SCNC: 4.3 MMOL/L — SIGNIFICANT CHANGE UP (ref 3.5–5.3)
POTASSIUM SERPL-SCNC: 4.3 MMOL/L — SIGNIFICANT CHANGE UP (ref 3.5–5.3)
SODIUM SERPL-SCNC: 138 MMOL/L — SIGNIFICANT CHANGE UP (ref 135–145)
SODIUM SERPL-SCNC: 138 MMOL/L — SIGNIFICANT CHANGE UP (ref 135–145)

## 2023-12-17 PROCEDURE — 99232 SBSQ HOSP IP/OBS MODERATE 35: CPT

## 2023-12-17 PROCEDURE — 99291 CRITICAL CARE FIRST HOUR: CPT

## 2023-12-17 RX ORDER — FUROSEMIDE 40 MG
40 TABLET ORAL DAILY
Refills: 0 | Status: DISCONTINUED | OUTPATIENT
Start: 2023-12-17 | End: 2023-12-17

## 2023-12-17 RX ORDER — FUROSEMIDE 40 MG
20 TABLET ORAL DAILY
Refills: 0 | Status: DISCONTINUED | OUTPATIENT
Start: 2023-12-17 | End: 2023-12-17

## 2023-12-17 RX ORDER — FUROSEMIDE 40 MG
20 TABLET ORAL
Refills: 0 | Status: DISCONTINUED | OUTPATIENT
Start: 2023-12-17 | End: 2023-12-17

## 2023-12-17 RX ORDER — SPIRONOLACTONE 25 MG/1
12.5 TABLET, FILM COATED ORAL DAILY
Refills: 0 | Status: DISCONTINUED | OUTPATIENT
Start: 2023-12-18 | End: 2023-12-20

## 2023-12-17 RX ORDER — FUROSEMIDE 40 MG
20 TABLET ORAL EVERY 12 HOURS
Refills: 0 | Status: DISCONTINUED | OUTPATIENT
Start: 2023-12-17 | End: 2023-12-18

## 2023-12-17 RX ADMIN — SPIRONOLACTONE 25 MILLIGRAM(S): 25 TABLET, FILM COATED ORAL at 06:04

## 2023-12-17 RX ADMIN — Medication 20 MILLILITER(S): at 22:14

## 2023-12-17 RX ADMIN — ENOXAPARIN SODIUM 40 MILLIGRAM(S): 100 INJECTION SUBCUTANEOUS at 09:26

## 2023-12-17 RX ADMIN — Medication 25 MILLIGRAM(S): at 22:13

## 2023-12-17 RX ADMIN — Medication 1 TABLET(S): at 11:07

## 2023-12-17 RX ADMIN — Medication 1 MILLIGRAM(S): at 11:07

## 2023-12-17 RX ADMIN — ENOXAPARIN SODIUM 40 MILLIGRAM(S): 100 INJECTION SUBCUTANEOUS at 22:14

## 2023-12-17 RX ADMIN — Medication 20 MILLIGRAM(S): at 22:13

## 2023-12-17 RX ADMIN — Medication 20 MILLIGRAM(S): at 11:07

## 2023-12-17 NOTE — PROGRESS NOTE ADULT - ASSESSMENT
This is a 53 y/p male with moderate EtOH use, presented to hospital with BARCENAS and PND x one week  trops negative  Ekg NSR pvc Rbbb Lafb t wave lateral changes  Echo EF <10% bi atrial enlargement  Cath normal coronaries  RA=10 mmHg; RV=36/11 mmHg; PCWP=29 mmHg; PVR=0 RAMIREZ Mild Post-capillary Pulmonary Hypertension=33/24=27 mmHg Reduced CO=3.8 L/min and CI=2.1 L/min/m2  Course complicated by + RSV and Etoh withdrawal requiring clonopin

## 2023-12-17 NOTE — PROGRESS NOTE ADULT - NS ATTEND AMEND GEN_ALL_CORE FT
53M with PMH ETOH dependence admitted with acute HFrEF, newly diagnosed.  pBNP 7600s, EKG with bifascicular block. Troponin negative.  -LVEF <10%, severe biventricular failure  -S/p LHC/RHC with normal coronaries, elevated filling pressures, PCWP 29  -Lasix increased to 40mg po bid  -HF likely related to ETOH use, however labs for causes of NICM in progress. HIV/hepatitis negative, ferritin normal, trypanosoma pending  -Advanced HF team following   -Patient with hypotension overnight; losartan held. Continue beta blocker, aldactone.   -GDMT will be limited as patient is uninsured 53M with PMH ETOH dependence admitted with acute HFrEF, newly diagnosed.  pBNP 7600s, EKG with bifascicular block. Troponin negative.  -LVEF <10%, severe biventricular failure  -S/p LHC/RHC with normal coronaries, elevated filling pressures, PCWP 29  -Lasix was increased to 40mg po bid by HF; however patient has been unable to tolerate GDMT due to borderline BP with symptoms of weakness and dizziness. Was given lasix 20mg this morning; will make lasix 20mg bid and uptitrate as tolerated.    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  pBNP 7600s, EKG with bifascicular block. Troponin negative.  -LVEF <10%, severe biventricular failure  -S/p LHC/RHC with normal coronaries, elevated filling pressures, PCWP 29  -Lasix was increased to 40mg po bid by HF; however patient has been unable to tolerate GDMT due to borderline BP with symptoms of weakness and dizziness. Was given lasix 20mg this morning. Decrease spironolactone to 12.5mg and make lasix 20mg bid with hold parameters.     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

## 2023-12-17 NOTE — PROGRESS NOTE ADULT - SUBJECTIVE AND OBJECTIVE BOX
[facility]                                                         CARDIOLOGY AT [facility]                                                                  [address], [place] [address]                                                         Telephone: [phone]. Fax:[phone]                                                                             PROGRESS NOTE    Reason for follow up:  Non ischemic cardiomyopathy,chf  Update: Interviewed with [employer]  Dizziness has improved  No chest discomfort  Patient has not received any losartan, spirolactone or Lasix yesterday due to dizziness  received spirolactone this am but Lasix was discontinued  I&0 not measured accurately  On isolation due to RSV    Review of symptoms:   Cardiac:  No chest pain. No dyspnea. No palpitations.  Respiratory: no cough. No dyspnea  Gastrointestinal: No diarrhea. No abdominal pain. No bleeding.   Neuro: No focal neuro complaints.      Vitals:  T(C): 36.4 (12-17-23 @ 05:06), Max: 36.7 (12-16-23 @ 16:54)  HR: 85 (12-17-23 @ 05:06) (85 - 91)  BP: 101/61 (12-17-23 @ 05:06) (72/52 - 123/68)  RR: 18 (12-17-23 @ 05:06) (17 - 20)  SpO2: 95% (12-17-23 @ 05:06) (91% - 99%)  Wt(kg): --  I&O's Summary    16 Dec 2023 07:01  -  17 Dec 2023 07:00  --------------------------------------------------------  IN: 340 mL / OUT: 0 mL / NET: 340 mL      Weight (kg): 79.5 (12-12 @ 14:13)      PHYSICAL EXAM:  Appearance: Comfortable. No acute distress  HEENT:  No jvd  Neurologic: A & O x 3, no gross focal deficits.  Cardiovascular: RRR S1 S2, No murmur, no rubs/gallops. No JVD  Respiratory: Lungs No rales  Gastrointestinal:  Soft, Non-tender, + BS  Lower Extremities: No edema  Psychiatry: Patient is calm. No agitation.   Skin: warm and dry.      CURRENT MEDICATIONS:  MEDICATIONS  (STANDING):  albuterol    90 MICROgram(s) HFA Inhaler 2 Puff(s) Inhalation every 6 hours  enoxaparin Injectable 40 milliGRAM(s) SubCutaneous every 12 hours  folic acid 1 milliGRAM(s) Oral daily  metoprolol succinate ER 25 milliGRAM(s) Oral at bedtime  multivitamin 1 Tablet(s) Oral daily  spironolactone 25 milliGRAM(s) Oral daily      LABS:	 	                         16.1   5.12  )-----------( 185      ( 16 Dec 2023 07:32 )             47.4     12-16    141  |  100  |  25.3<H>  ----------------------------<  97  4.5   |  29.0  |  0.90    Ca    8.2<L>      16 Dec 2023 07:32  Mg     1.9     12-16      proBNP:   Lipid Profile: Date: 12-13 @ 07:18  Total cholesterol 89; Direct LDL: --; HDL: 39; Triglycerides:68    HgA1c:   TSH: Thyroid Stimulating Hormone, Serum: 0.56 uIU/mL    TELEMETRY: Sinus with couplets      DIAGNOSTIC TESTING:  [ ] Echocardiogram: < from: TTE Limited W or WO Ultrasound Enhancing Agent (12.13.23 @ 17:02) >  CONCLUSIONS:      1. The left atrium is severely dilated.   2. The left ventricular volumes are severely increased.   3. Left ventricular cavity is severely dilated. Left ventricular systolic function is severely decreased with an ejection fraction visually estimated at <10 %.   4. There is severe (grade 3) left ventricular diastolic dysfunction, with elevated filling pressure.   5. The right atrium is severely dilated in size.   6. Severely enlarged right ventricular cavity size and severely reduced systolic function.   7. Mild mitral regurgitation.   8. Tricuspid valve leaflets appear normal. Severe tricuspid regurgitation.   9. Estimated pulmonary artery systolic pressure is 46 mmHg, consistent with mild pulmonary hypertension.  10. Trace pericardial effusion.  11. Team aware of the findings.    < end of copied text >    [ ]  Catheterization:  < from: Cardiac Catheterization (12.15.23 @ 12:13) >  Coronary Angiography   Left Coronary System:   A catheter was positioned into the vessel ostia under fluoroscopic  guidance. Contrast injections were performed using hand  injection. Right Coronary System:   A catheter was positioned into the vessel ostia under fluoroscopic  guidance. Contrast injections were performed using hand  injection.    Right Heart Cath   Mild Elevation of Right Heart Pressures:   RA=10 mmHg; RV=36/11 mmHg; PCWP=29 mmHg; PVR=0 RAMIREZ   Mild Post-capillary Pulmonary Hypertension=33/24=27 mmHg   Reduced CO=3.8 L/min and CI=2.1 L/min/m2   Reduced LEE=0.9   Reduced =0.7W     Measurements of pressures were obtained.      Diagnostic Findings:     Coronary Angiography   The coronary circulation is right dominant.    LM   Left main artery: The segment is visually normal in size and  structure. The segment is average sized. Angiography shows no  disease.    LAD   Left anterior descending artery: The segment is visually normal in  size and structure. The segment is medium to large sized.  Angiography shows no disease and the vessel wraps around the cardiac  apex.  CX   Circumflex: The segment is visually normal in size and structure. The  segment is medium sized. Angiography shows no  disease.    RCA   Right coronary artery: The segment is visually normal in size and  structure. The segment is large, dominant. Angiography  shows no disease.                                                                    [facility]                                                         CARDIOLOGY AT [facility]                                                                  [address], [place] [address]                                                         Telephone: [phone]. Fax:[phone]                                                                             PROGRESS NOTE    Reason for follow up:  Non ischemic cardiomyopathy,chf  Update: Interviewed with [employer]  Dizziness has improved  No chest discomfort  Patient has not received any losartan, spirolactone or Lasix yesterday due to dizziness  received spirolactone this am but Lasix was discontinued  I&0 not measured accurately  On isolation due to RSV    Review of symptoms:   Cardiac:  No chest pain. No dyspnea. No palpitations.  Respiratory: no cough. No dyspnea  Gastrointestinal: No diarrhea. No abdominal pain. No bleeding.   Neuro: No focal neuro complaints.      Vitals:  T(C): 36.4 (12-17-23 @ 05:06), Max: 36.7 (12-16-23 @ 16:54)  HR: 85 (12-17-23 @ 05:06) (85 - 91)  BP: 101/61 (12-17-23 @ 05:06) (72/52 - 123/68)  RR: 18 (12-17-23 @ 05:06) (17 - 20)  SpO2: 95% (12-17-23 @ 05:06) (91% - 99%)  Wt(kg): --  I&O's Summary    16 Dec 2023 07:01  -  17 Dec 2023 07:00  --------------------------------------------------------  IN: 340 mL / OUT: 0 mL / NET: 340 mL      Weight (kg): 79.5 (12-12 @ 14:13)      PHYSICAL EXAM:  Appearance: Comfortable. No acute distress  HEENT:  No jvd  Neurologic: A & O x 3, no gross focal deficits.  Cardiovascular: RRR S1 S2, No murmur, no rubs/gallops. No JVD  Respiratory: Lungs No rales  Gastrointestinal:  Soft, Non-tender, + BS  Lower Extremities: No edema  Psychiatry: Patient is calm. No agitation.   Skin: warm and dry.      CURRENT MEDICATIONS:  MEDICATIONS  (STANDING):  albuterol    90 MICROgram(s) HFA Inhaler 2 Puff(s) Inhalation every 6 hours  enoxaparin Injectable 40 milliGRAM(s) SubCutaneous every 12 hours  folic acid 1 milliGRAM(s) Oral daily  metoprolol succinate ER 25 milliGRAM(s) Oral at bedtime  multivitamin 1 Tablet(s) Oral daily  spironolactone 25 milliGRAM(s) Oral daily      LABS:	 	                         16.1   5.12  )-----------( 185      ( 16 Dec 2023 07:32 )             47.4     12-16    141  |  100  |  25.3<H>  ----------------------------<  97  4.5   |  29.0  |  0.90    Ca    8.2<L>      16 Dec 2023 07:32  Mg     1.9     12-16      proBNP:   Lipid Profile: Date: 12-13 @ 07:18  Total cholesterol 89; Direct LDL: --; HDL: 39; Triglycerides:68    HgA1c:   TSH: Thyroid Stimulating Hormone, Serum: 0.56 uIU/mL    TELEMETRY: Sinus with couplets      DIAGNOSTIC TESTING:  [ ] Echocardiogram: < from: TTE Limited W or WO Ultrasound Enhancing Agent (12.13.23 @ 17:02) >  CONCLUSIONS:      1. The left atrium is severely dilated.   2. The left ventricular volumes are severely increased.   3. Left ventricular cavity is severely dilated. Left ventricular systolic function is severely decreased with an ejection fraction visually estimated at <10 %.   4. There is severe (grade 3) left ventricular diastolic dysfunction, with elevated filling pressure.   5. The right atrium is severely dilated in size.   6. Severely enlarged right ventricular cavity size and severely reduced systolic function.   7. Mild mitral regurgitation.   8. Tricuspid valve leaflets appear normal. Severe tricuspid regurgitation.   9. Estimated pulmonary artery systolic pressure is 46 mmHg, consistent with mild pulmonary hypertension.  10. Trace pericardial effusion.  11. Team aware of the findings.    < end of copied text >    [ ]  Catheterization:  < from: Cardiac Catheterization (12.15.23 @ 12:13) >  Coronary Angiography   Left Coronary System:   A catheter was positioned into the vessel ostia under fluoroscopic  guidance. Contrast injections were performed using hand  injection. Right Coronary System:   A catheter was positioned into the vessel ostia under fluoroscopic  guidance. Contrast injections were performed using hand  injection.    Right Heart Cath   Mild Elevation of Right Heart Pressures:   RA=10 mmHg; RV=36/11 mmHg; PCWP=29 mmHg; PVR=0 RAMIREZ   Mild Post-capillary Pulmonary Hypertension=33/24=27 mmHg   Reduced CO=3.8 L/min and CI=2.1 L/min/m2   Reduced LEE=0.9   Reduced =0.7W     Measurements of pressures were obtained.      Diagnostic Findings:     Coronary Angiography   The coronary circulation is right dominant.    LM   Left main artery: The segment is visually normal in size and  structure. The segment is average sized. Angiography shows no  disease.    LAD   Left anterior descending artery: The segment is visually normal in  size and structure. The segment is medium to large sized.  Angiography shows no disease and the vessel wraps around the cardiac  apex.  CX   Circumflex: The segment is visually normal in size and structure. The  segment is medium sized. Angiography shows no  disease.    RCA   Right coronary artery: The segment is visually normal in size and  structure. The segment is large, dominant. Angiography  shows no disease.

## 2023-12-17 NOTE — PROGRESS NOTE ADULT - SUBJECTIVE AND OBJECTIVE BOX
[facility] Division of Hospital Medicine    Chief Complaint:      SUBJECTIVE / OVERNIGHT EVENTS:    Patient c/o dizziness this morning regardless of position. his orthostatic vitals were negative.     MEDICATIONS  (STANDING):  albuterol    90 MICROgram(s) HFA Inhaler 2 Puff(s) Inhalation every 6 hours  enoxaparin Injectable 40 milliGRAM(s) SubCutaneous every 12 hours  folic acid 1 milliGRAM(s) Oral daily  furosemide    Tablet 20 milliGRAM(s) Oral every 12 hours  metoprolol succinate ER 25 milliGRAM(s) Oral at bedtime  multivitamin 1 Tablet(s) Oral daily    MEDICATIONS  (PRN):  acetaminophen     Tablet .. 650 milliGRAM(s) Oral every 6 hours PRN Temp greater or equal to 38C (100.4F), Mild Pain (1 - 3)  aluminum hydroxide/magnesium hydroxide/simethicone Suspension 30 milliLiter(s) Oral every 4 hours PRN Dyspepsia  guaifenesin/dextromethorphan Oral Liquid 20 milliLiter(s) Oral four times a day PRN Cough  LORazepam   Injectable 2 milliGRAM(s) IV Push every 1 hour PRN Symptom-triggered: each CIWA -Ar score 8 or GREATER  melatonin 3 milliGRAM(s) Oral at bedtime PRN Insomnia  ondansetron Injectable 4 milliGRAM(s) IV Push every 8 hours PRN Nausea and/or Vomiting        I&O's Summary    16 Dec 2023 07:01  -  17 Dec 2023 07:00  --------------------------------------------------------  IN: 340 mL / OUT: 0 mL / NET: 340 mL        PHYSICAL EXAM:  Vital Signs Last 24 Hrs  T(C): 36.5 (17 Dec 2023 11:00), Max: 36.7 (16 Dec 2023 16:54)  T(F): 97.7 (17 Dec 2023 11:00), Max: 98 (16 Dec 2023 16:54)  HR: 79 (17 Dec 2023 13:50) (79 - 93)  BP: 92/60 (17 Dec 2023 13:50) (91/60 - 123/68)  BP(mean): --  RR: 18 (17 Dec 2023 13:50) (17 - 18)  SpO2: 95% (17 Dec 2023 13:50) (93% - 99%)    Parameters below as of 17 Dec 2023 13:50  Patient On (Oxygen Delivery Method): room air        CONSTITUTIONAL: NAD  ENMT: normocephalic, atraumatic  RESPIRATORY: Normal respiratory effort; lungs are clear to auscultation bilaterally  CARDIOVASCULAR: Regular rate and rhythm, normal S1 and S2, no murmur/rub/gallop.  ABDOMEN: Nontender to palpation, no rebound/guarding; No hepatosplenomegaly  MUSCLOSKELETAL:  No edema  PSYCH: A+O to person, place, and time; affect appropriate  NEUROLOGY: CN 2-12 are intact and symmetric; no gross deficits;   SKIN: No rashes; no palpable lesions  LABS:                        16.1   5.12  )-----------( 185      ( 16 Dec 2023 07:32 )             47.4     12-17    138  |  102  |  17.6  ----------------------------<  103<H>  4.3   |  27.0  |  0.81    Ca    8.3<L>      17 Dec 2023 07:05  Mg     1.9     12-16            Urinalysis Basic - ( 17 Dec 2023 07:05 )    Color: x / Appearance: x / SG: x / pH: x  Gluc: 103 mg/dL / Ketone: x  / Bili: x / Urobili: x   Blood: x / Protein: x / Nitrite: x   Leuk Esterase: x / RBC: x / WBC x   Sq Epi: x / Non Sq Epi: x / Bacteria: x        CAPILLARY BLOOD GLUCOSE            RADIOLOGY & ADDITIONAL TESTS:  Results Reviewed:   Imaging Personally Reviewed:  Electrocardiogram Personally Reviewed:                                           [facility] Division of Hospital Medicine    Chief Complaint:      SUBJECTIVE / OVERNIGHT EVENTS:    Patient c/o dizziness this morning regardless of position. his orthostatic vitals were negative.     MEDICATIONS  (STANDING):  albuterol    90 MICROgram(s) HFA Inhaler 2 Puff(s) Inhalation every 6 hours  enoxaparin Injectable 40 milliGRAM(s) SubCutaneous every 12 hours  folic acid 1 milliGRAM(s) Oral daily  furosemide    Tablet 20 milliGRAM(s) Oral every 12 hours  metoprolol succinate ER 25 milliGRAM(s) Oral at bedtime  multivitamin 1 Tablet(s) Oral daily    MEDICATIONS  (PRN):  acetaminophen     Tablet .. 650 milliGRAM(s) Oral every 6 hours PRN Temp greater or equal to 38C (100.4F), Mild Pain (1 - 3)  aluminum hydroxide/magnesium hydroxide/simethicone Suspension 30 milliLiter(s) Oral every 4 hours PRN Dyspepsia  guaifenesin/dextromethorphan Oral Liquid 20 milliLiter(s) Oral four times a day PRN Cough  LORazepam   Injectable 2 milliGRAM(s) IV Push every 1 hour PRN Symptom-triggered: each CIWA -Ar score 8 or GREATER  melatonin 3 milliGRAM(s) Oral at bedtime PRN Insomnia  ondansetron Injectable 4 milliGRAM(s) IV Push every 8 hours PRN Nausea and/or Vomiting        I&O's Summary    16 Dec 2023 07:01  -  17 Dec 2023 07:00  --------------------------------------------------------  IN: 340 mL / OUT: 0 mL / NET: 340 mL        PHYSICAL EXAM:  Vital Signs Last 24 Hrs  T(C): 36.5 (17 Dec 2023 11:00), Max: 36.7 (16 Dec 2023 16:54)  T(F): 97.7 (17 Dec 2023 11:00), Max: 98 (16 Dec 2023 16:54)  HR: 79 (17 Dec 2023 13:50) (79 - 93)  BP: 92/60 (17 Dec 2023 13:50) (91/60 - 123/68)  BP(mean): --  RR: 18 (17 Dec 2023 13:50) (17 - 18)  SpO2: 95% (17 Dec 2023 13:50) (93% - 99%)    Parameters below as of 17 Dec 2023 13:50  Patient On (Oxygen Delivery Method): room air        CONSTITUTIONAL: NAD  ENMT: normocephalic, atraumatic  RESPIRATORY: Normal respiratory effort; lungs are clear to auscultation bilaterally  CARDIOVASCULAR: Regular rate and rhythm, normal S1 and S2, no murmur/rub/gallop.  ABDOMEN: Nontender to palpation, no rebound/guarding; No hepatosplenomegaly  MUSCLOSKELETAL:  No edema  PSYCH: A+O to person, place, and time; affect appropriate  NEUROLOGY: CN 2-12 are intact and symmetric; no gross deficits;   SKIN: No rashes; no palpable lesions  LABS:                        16.1   5.12  )-----------( 185      ( 16 Dec 2023 07:32 )             47.4     12-17    138  |  102  |  17.6  ----------------------------<  103<H>  4.3   |  27.0  |  0.81    Ca    8.3<L>      17 Dec 2023 07:05  Mg     1.9     12-16            Urinalysis Basic - ( 17 Dec 2023 07:05 )    Color: x / Appearance: x / SG: x / pH: x  Gluc: 103 mg/dL / Ketone: x  / Bili: x / Urobili: x   Blood: x / Protein: x / Nitrite: x   Leuk Esterase: x / RBC: x / WBC x   Sq Epi: x / Non Sq Epi: x / Bacteria: x        CAPILLARY BLOOD GLUCOSE            RADIOLOGY & ADDITIONAL TESTS:  Results Reviewed:   Imaging Personally Reviewed:  Electrocardiogram Personally Reviewed:

## 2023-12-17 NOTE — PROGRESS NOTE ADULT - PROBLEM SELECTOR PLAN 1
Change diet to low sodium  Fluid restrictions  Restart Lasix 40mg qd, aldactone 25mg qd  consider restarting losartan tomorrow if b/p ok  discussed salt restriction and fluid restriction with patient who does not appear to be receptive

## 2023-12-17 NOTE — PROGRESS NOTE ADULT - PROBLEM SELECTOR PLAN 2
No further nsvt  CM  nsr with pvcs      Cardiac meds  metoprolol succinate ER 25 milliGRAM(s) Oral at bedtime  multivitamin 1 Tablet(s) Oral daily  spironolactone 25 milliGRAM(s) Oral   Lasix 40mg to be restated today No further nsvt  CM  nsr with pvcs

## 2023-12-17 NOTE — PROGRESS NOTE ADULT - ASSESSMENT
53/M, alcohol use disorder (2-3 rum shots)/ night, last drink 12/10, no other significant pmhx presented to the ED for complaints of dyspnea on exertion which began approximately 1 week ago. RVP- RSV +. Also found to have acute systolic heart failure, pending cardiac cath.     Chronic systolic heart failure  This is new diagnosis for him, acute element has resolved. TTE with EF <10%. LHC showed normal coronaries. right heart cath with elevated pressures.  has not been tolerating cardiac meds due to low BP. c/w aldactone and metoprolol XL. started on lasix 20 mg twice a day with holding parameters    Dizziness. patient has been c/o dizziness regardless of position. this is not associated with orthostatic hypotension. will get PT evaluation    RSV related URI. resolved.       Alcohol withdrawal  on CIWA and librium taper.    RUQ pain  resolved  - CMP WNL   - RUQ without acute findings    VTE prophylaxis- Lovenox    Dispo: Home pending cardiac meds optimization

## 2023-12-18 PROCEDURE — 99234 HOSP IP/OBS SM DT SF/LOW 45: CPT

## 2023-12-18 PROCEDURE — 99223 1ST HOSP IP/OBS HIGH 75: CPT

## 2023-12-18 PROCEDURE — 99232 SBSQ HOSP IP/OBS MODERATE 35: CPT

## 2023-12-18 RX ORDER — FUROSEMIDE 40 MG
20 TABLET ORAL DAILY
Refills: 0 | Status: DISCONTINUED | OUTPATIENT
Start: 2023-12-18 | End: 2023-12-20

## 2023-12-18 RX ADMIN — ENOXAPARIN SODIUM 40 MILLIGRAM(S): 100 INJECTION SUBCUTANEOUS at 09:42

## 2023-12-18 RX ADMIN — Medication 25 MILLIGRAM(S): at 21:20

## 2023-12-18 RX ADMIN — Medication 20 MILLIGRAM(S): at 09:41

## 2023-12-18 RX ADMIN — SPIRONOLACTONE 12.5 MILLIGRAM(S): 25 TABLET, FILM COATED ORAL at 05:57

## 2023-12-18 RX ADMIN — ENOXAPARIN SODIUM 40 MILLIGRAM(S): 100 INJECTION SUBCUTANEOUS at 21:20

## 2023-12-18 NOTE — CONSULT NOTE ADULT - SUBJECTIVE AND OBJECTIVE BOX
[facility]  INFECTIOUS DISEASES at [place] and [place]  =====================================================       Emmanuel Abraham MD                                                        Diplomates American Board of Internal Medicine & Infectious Diseases                * [place] Office - Appt - Tel  [phone] Fax [phone]                * [place] Office - Appt - Tel [phone] Fax [phone]                                  Hospital Consult line:  [phone]  =====================================================      [id number]  DARION LOUISE        CC: Patient is a 53y old  Male who presents with a chief complaint of Dyspnea on exertion (18 Dec 2023 11:29)    HPI: 53/M, alcohol use disorder (2-3 rum shots)/ night, last drink 12/10, no other significant pmhx presented to the ED for complaints of dyspnea on exertion which began approximately 1 week ago. Reports that he started noticing some dyspnea around a week back which progressively worsened in the past 2-3 days and was associated with cough. Reports some vague pain in the RUQ below his ribs without any hx of inciting injury or relation to food intake which began appx the same time. Pain is sporadic and is apparently better today. On direct questioning denies orthopnea, chest pain, fever or chills, leg swelling or dizziness.  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     ROS limited as patient was somnolent during my exam, and unable to provide relevant history. States [place] in the [place] 5 [place]. He is from [facility]   ______________________________________________________  PAST MEDICAL & SURGICAL HISTORY:  Alcohol abuse    History of repair of laceration      Social History:  From [facility]  Alcohol abuse     FAMILY HISTORY:  No pertinent family history in first degree relatives        ______________________________________________________  Allergies    No Known Allergies    Intolerances        ______________________________________________________  MEDICATIONS:  Antibiotics:    Other medications:  albuterol    90 MICROgram(s) HFA Inhaler 2 Puff(s) Inhalation every 6 hours  enoxaparin Injectable 40 milliGRAM(s) SubCutaneous every 12 hours  furosemide    Tablet 20 milliGRAM(s) Oral daily  metoprolol succinate ER 25 milliGRAM(s) Oral at bedtime  spironolactone 12.5 milliGRAM(s) Oral daily    ______________________________________________________  REVIEW OF SYSTEMS:  Unable to obtain due to medical condition - somnolent, able to answer very simple questions but falls asleep immediately.     _____________________________________________________  PHYSICAL EXAM:  GEN: in NAD, lying flat in bed   HEENT: normocephalic and atraumatic. Anicteric sclerae. Moist mucous membranes. No mucosal lesions. NECK: Supple. No palpable neck masses or LN  LUNGS: eupneic, CTA B/L, no adventitious sounds  HEART: RRR, no m/r/g  ABDOMEN: Soft, NT, ND, no palpable masses.  +BS.    :  no Barr catheter  EXTREMITIES: some edema.  MSK: No joint deformity or swelling  LYMPH: no palpable cervical, supraclavicular lymph nodes  NEUROLOGIC: moving all extrem  SKIN: No rash, wounds or jaundice  LINES: PIV     ______________________________________________________      Vitals:  T(F): 98.7 (18 Dec 2023 17:01), Max: 98.7 (18 Dec 2023 17:01)  HR: 83 (18 Dec 2023 17:01)  BP: 100/64 (18 Dec 2023 17:01)  RR: 16 (18 Dec 2023 17:01)  SpO2: 91% (18 Dec 2023 17:01) (91% - 95%)  temp max in last 48H T(F): , Max: 98.7 (12-18-23 @ 17:01)    Current Antibiotics:    Other medications:  albuterol    90 MICROgram(s) HFA Inhaler 2 Puff(s) Inhalation every 6 hours  enoxaparin Injectable 40 milliGRAM(s) SubCutaneous every 12 hours  furosemide    Tablet 20 milliGRAM(s) Oral daily  metoprolol succinate ER 25 milliGRAM(s) Oral at bedtime  spironolactone 12.5 milliGRAM(s) Oral daily        12-17    138  |  102  |  17.6  ----------------------------<  103<H>  4.3   |  27.0  |  0.81    Ca    8.3<L>      17 Dec 2023 07:05      RECENT CULTURES:  Trypanosoma cruzi Antibodies (12.14.23 @ 06:14)    Trypanosoma cruzi Antibodies:   4.3 INTERPRETIVE INFORMATION: Trypanosoma cruzi Ab, IgG    1.0 IV or less......Negative - No significant level of                        Trypanosoma cruzi IgG antibody detected.    1.1 IV..............Equivocal - Questionable presence of              Trypanosoma cruzi IgG antibody detected.                        Repeat testing in 10-14 days may be                        helpful.    1.2 IV or greater...Positive - IgG antibodies to Trypanosoma                        cruzi detected, which may suggest current                        or past infection.  This assay should not be used for blood donor screening or  associated re-entry protocols, or for screening Human Cell and  Cellular Tissue-Based Products (HCT/Ps). According to the CDC, at  least two different serologic tests should be used to make the  laboratory diagnosis of chronic Chagas Disease, as no single  serologic test is sufficiently sensitive and specific.  Performed By: [facility]  [address] [place] [id number]  : Robson Schultz MD, PhD  CLIA Number: [id number]    12-12 @ 19:22    RVP with SARS-CoV-2   Detected RSV    WBC Count: 5.12 K/uL (12-16-23 @ 07:32)    Creatinine: 0.81 mg/dL (12-17-23 @ 07:05)  Creatinine: 0.90 mg/dL (12-16-23 @ 07:32)  Creatinine: 0.86 mg/dL (12-15-23 @ 07:03)  Creatinine: 1.02 mg/dL (12-14-23 @ 06:14)      Sedimentation Rate, Erythrocyte: 3 mm/hr (12-13-23 @ 07:18)    Procalcitonin, Serum: 0.09 ng/mL (12-13-23 @ 07:18)     SARS-CoV-2: NotDetec (12-12-23 @ 19:22)    ______________________________________________________  CARDIOLOGY  < from: 12 Lead ECG (12.15.23 @ 13:06) >  Diagnosis Line Sinus rhythm withoccasional Premature ventricular complexes  Possible Left atrial enlargement  Right bundle branch block  Left anterior fascicular block  *** Bifascicular block ***  Cannot rule out Inferior infarct (masked by fascicular block?) , age undetermined  T wave abnormality, consider lateral ischemia  Abnormal ECG    Confirmed by JESSICA SY ([id number]) on 12/17/2023 6:34:12 PM    < end of copied text >  < from: TTE Limited W or WO Ultrasound Enhancing Agent (12.13.23 @ 17:02) >  CONCLUSIONS:      1. The left atrium is severely dilated.   2. The left ventricular volumes are severely increased.   3. Left ventricular cavity is severely dilated. Left ventricular systolic function is severely decreased with an ejection fraction visually estimated at <10 %.   4. There is severe (grade 3) left ventricular diastolic dysfunction, with elevated filling pressure.   5. The right atrium is severely dilated in size.   6. Severely enlarged right ventricular cavity size and severely reduced systolic function.   7. Mild mitral regurgitation.   8. Tricuspid valve leaflets appear normal. Severe tricuspid regurgitation.   9. Estimated pulmonary artery systolic pressure is 46 mmHg, consistent with mild pulmonary hypertension.  10. Trace pericardial effusion.  11. Team aware of the findings.    < end of copied text >    ______________________________________________________  RADIOLOGY  < from: US Duplex Venous Lower Ext Complete, Bilateral (12.14.23 @ 11:22) >    FINDINGS:    RIGHT:  Normal compressibility of the RIGHT common femoral, femoral and popliteal   veins.  Doppler examination shows normal spontaneous and phasic flow.  No RIGHT calf vein thrombosis is detected.    LEFT:  Normal compressibility of the LEFT common femoral, femoral and popliteal   veins.  Doppler examination shows normal spontaneous and phasic flow.  No LEFT calf vein thrombosis is detected.    IMPRESSION:  No evidence of deep venous thrombosis in either lower extremity.    < end of copied text >  < from: US Abdomen Upper Quadrant Right (12.13.23 @ 18:00) >    IMPRESSION:  Incompletely distended gallbladder.  Nonspecific, mild gallbladder wall   thickening.  No sonographic evidence of cholelithiasis or gallbladder   sludge.    Right pleural effusion.    < end of copied text >  < from: Xray Chest 1 View- PORTABLE-Urgent (12.12.23 @ 17:39) >  FINDINGS:    Single frontal view of the chest demonstrates the lungs to be clear. The   cardiomediastinal silhouette is enlarged. No acute osseous abnormalities.   Overlying EKG leads and wires are noted    IMPRESSION: No acute cardiopulmonary disease process. Cardiomegaly.    < end of copied text >   [facility]  INFECTIOUS DISEASES at [place] and [place]  =====================================================       Emmanuel Abraham MD                                                        Diplomates American Board of Internal Medicine & Infectious Diseases                * [place] Office - Appt - Tel  [phone] Fax [phone]                * [place] Office - Appt - Tel [phone] Fax [phone]                                  Hospital Consult line:  [phone]  =====================================================      [id number]  DARION LOUISE        CC: Patient is a 53y old  Male who presents with a chief complaint of Dyspnea on exertion (18 Dec 2023 11:29)    HPI: 53/M, alcohol use disorder (2-3 rum shots)/ night, last drink 12/10, no other significant pmhx presented to the ED for complaints of dyspnea on exertion which began approximately 1 week ago. Reports that he started noticing some dyspnea around a week back which progressively worsened in the past 2-3 days and was associated with cough. Reports some vague pain in the RUQ below his ribs without any hx of inciting injury or relation to food intake which began appx the same time. Pain is sporadic and is apparently better today. On direct questioning denies orthopnea, chest pain, fever or chills, leg swelling or dizziness.  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     ROS limited as patient was somnolent during my exam, and unable to provide relevant history. [place]. He is from [facility]   ______________________________________________________  PAST MEDICAL & SURGICAL HISTORY:  Alcohol abuse    History of repair of laceration      Social History:  From [facility]  Alcohol abuse     FAMILY HISTORY:  No pertinent family history in first degree relatives        ______________________________________________________  Allergies    No Known Allergies    Intolerances        ______________________________________________________  MEDICATIONS:  Antibiotics:    Other medications:  albuterol    90 MICROgram(s) HFA Inhaler 2 Puff(s) Inhalation every 6 hours  enoxaparin Injectable 40 milliGRAM(s) SubCutaneous every 12 hours  furosemide    Tablet 20 milliGRAM(s) Oral daily  metoprolol succinate ER 25 milliGRAM(s) Oral at bedtime  spironolactone 12.5 milliGRAM(s) Oral daily    ______________________________________________________  REVIEW OF SYSTEMS:  Unable to obtain due to medical condition - somnolent, able to answer very simple questions but falls asleep immediately.     _____________________________________________________  PHYSICAL EXAM:  GEN: in NAD, lying flat in bed   HEENT: normocephalic and atraumatic. Anicteric sclerae. Moist mucous membranes. No mucosal lesions. NECK: Supple. No palpable neck masses or LN  LUNGS: eupneic, CTA B/L, no adventitious sounds  HEART: RRR, no m/r/g  ABDOMEN: Soft, NT, ND, no palpable masses.  +BS.    :  no Barr catheter  EXTREMITIES: some edema.  MSK: No joint deformity or swelling  LYMPH: no palpable cervical, supraclavicular lymph nodes  NEUROLOGIC: moving all extrem  SKIN: No rash, wounds or jaundice  LINES: PIV     ______________________________________________________      Vitals:  T(F): 98.7 (18 Dec 2023 17:01), Max: 98.7 (18 Dec 2023 17:01)  HR: 83 (18 Dec 2023 17:01)  BP: 100/64 (18 Dec 2023 17:01)  RR: 16 (18 Dec 2023 17:01)  SpO2: 91% (18 Dec 2023 17:01) (91% - 95%)  temp max in last 48H T(F): , Max: 98.7 (12-18-23 @ 17:01)    Current Antibiotics:    Other medications:  albuterol    90 MICROgram(s) HFA Inhaler 2 Puff(s) Inhalation every 6 hours  enoxaparin Injectable 40 milliGRAM(s) SubCutaneous every 12 hours  furosemide    Tablet 20 milliGRAM(s) Oral daily  metoprolol succinate ER 25 milliGRAM(s) Oral at bedtime  spironolactone 12.5 milliGRAM(s) Oral daily        12-17    138  |  102  |  17.6  ----------------------------<  103<H>  4.3   |  27.0  |  0.81    Ca    8.3<L>      17 Dec 2023 07:05      RECENT CULTURES:  Trypanosoma cruzi Antibodies (12.14.23 @ 06:14)    Trypanosoma cruzi Antibodies:   4.3 INTERPRETIVE INFORMATION: Trypanosoma cruzi Ab, IgG    1.0 IV or less......Negative - No significant level of                        Trypanosoma cruzi IgG antibody detected.    1.1 IV..............Equivocal - Questionable presence of              Trypanosoma cruzi IgG antibody detected.                        Repeat testing in 10-14 days may be                        helpful.    1.2 IV or greater...Positive - IgG antibodies to Trypanosoma                        cruzi detected, which may suggest current                        or past infection.  This assay should not be used for blood donor screening or  associated re-entry protocols, or for screening Human Cell and  Cellular Tissue-Based Products (HCT/Ps). According to the CDC, at  least two different serologic tests should be used to make the  laboratory diagnosis of chronic Chagas Disease, as no single  serologic test is sufficiently sensitive and specific.  Performed By: [facility]  [address] [place] [id number]  : Robson Schultz MD, PhD  CLIA Number: [id number]    12-12 @ 19:22    RVP with SARS-CoV-2   Detected RSV    WBC Count: 5.12 K/uL (12-16-23 @ 07:32)    Creatinine: 0.81 mg/dL (12-17-23 @ 07:05)  Creatinine: 0.90 mg/dL (12-16-23 @ 07:32)  Creatinine: 0.86 mg/dL (12-15-23 @ 07:03)  Creatinine: 1.02 mg/dL (12-14-23 @ 06:14)      Sedimentation Rate, Erythrocyte: 3 mm/hr (12-13-23 @ 07:18)    Procalcitonin, Serum: 0.09 ng/mL (12-13-23 @ 07:18)     SARS-CoV-2: NotDetec (12-12-23 @ 19:22)    ______________________________________________________  CARDIOLOGY  < from: 12 Lead ECG (12.15.23 @ 13:06) >  Diagnosis Line Sinus rhythm withoccasional Premature ventricular complexes  Possible Left atrial enlargement  Right bundle branch block  Left anterior fascicular block  *** Bifascicular block ***  Cannot rule out Inferior infarct (masked by fascicular block?) , age undetermined  T wave abnormality, consider lateral ischemia  Abnormal ECG    Confirmed by JESSICA SY ([id number]) on 12/17/2023 6:34:12 PM    < end of copied text >  < from: TTE Limited W or WO Ultrasound Enhancing Agent (12.13.23 @ 17:02) >  CONCLUSIONS:      1. The left atrium is severely dilated.   2. The left ventricular volumes are severely increased.   3. Left ventricular cavity is severely dilated. Left ventricular systolic function is severely decreased with an ejection fraction visually estimated at <10 %.   4. There is severe (grade 3) left ventricular diastolic dysfunction, with elevated filling pressure.   5. The right atrium is severely dilated in size.   6. Severely enlarged right ventricular cavity size and severely reduced systolic function.   7. Mild mitral regurgitation.   8. Tricuspid valve leaflets appear normal. Severe tricuspid regurgitation.   9. Estimated pulmonary artery systolic pressure is 46 mmHg, consistent with mild pulmonary hypertension.  10. Trace pericardial effusion.  11. Team aware of the findings.    < end of copied text >    ______________________________________________________  RADIOLOGY  < from: US Duplex Venous Lower Ext Complete, Bilateral (12.14.23 @ 11:22) >    FINDINGS:    RIGHT:  Normal compressibility of the RIGHT common femoral, femoral and popliteal   veins.  Doppler examination shows normal spontaneous and phasic flow.  No RIGHT calf vein thrombosis is detected.    LEFT:  Normal compressibility of the LEFT common femoral, femoral and popliteal   veins.  Doppler examination shows normal spontaneous and phasic flow.  No LEFT calf vein thrombosis is detected.    IMPRESSION:  No evidence of deep venous thrombosis in either lower extremity.    < end of copied text >  < from: US Abdomen Upper Quadrant Right (12.13.23 @ 18:00) >    IMPRESSION:  Incompletely distended gallbladder.  Nonspecific, mild gallbladder wall   thickening.  No sonographic evidence of cholelithiasis or gallbladder   sludge.    Right pleural effusion.    < end of copied text >  < from: Xray Chest 1 View- PORTABLE-Urgent (12.12.23 @ 17:39) >  FINDINGS:    Single frontal view of the chest demonstrates the lungs to be clear. The   cardiomediastinal silhouette is enlarged. No acute osseous abnormalities.   Overlying EKG leads and wires are noted    IMPRESSION: No acute cardiopulmonary disease process. Cardiomegaly.    < end of copied text >

## 2023-12-18 NOTE — PROGRESS NOTE ADULT - ASSESSMENT
53/M, alcohol use disorder (2-3 rum shots)/ night, last drink 12/10, no other significant pmhx presented to the ED for complaints of dyspnea on exertion which began approximately 1 week ago. RVP- RSV +. Also found to have acute systolic heart failure, pending cardiac cath.     Chronic systolic heart failure  This is new diagnosis for him, acute element has resolved. TTE with EF <10%. LHC showed normal coronaries. right heart cath with elevated pressures.  Low BP limiting GDMT. has tolerated lasix 20 mg twice a day yesterday. c/w aldactone and metoprolol XL. HF team has reduced lasix to once a day to make room for ACEI/ARB to be started likely tomorrow.    Dizziness. patient has been c/o dizziness regardless of position. this is not associated with orthostatic hypotension. no dizziness this morning.    RSV related URI. resolved.       Alcohol withdrawal  on CIWA and librium taper.    RUQ pain  resolved  - CMP WNL   - RUQ without acute findings    VTE prophylaxis- Lovenox    Dispo: Home pending cardiac meds optimization  53/M, alcohol use disorder (2-3 rum shots)/ night, last drink 12/10, no other significant pmhx presented to the ED for complaints of dyspnea on exertion which began approximately 1 week ago. RVP- RSV +. Also found to have acute systolic heart failure, pending cardiac cath.     Chronic systolic heart failure 2/2 possible chronic chagas disease  This is new diagnosis for him, acute element has resolved. TTE with EF <10%. LHC showed normal coronaries. right heart cath with elevated pressures.  Low BP limiting GDMT. has tolerated lasix 20 mg twice a day yesterday. c/w aldactone and metoprolol XL. HF team has reduced lasix to once a day to make room for ACEI/ARB to be started likely tomorrow. trypanosoma cruzi IgG Ab positive. no specific treatment indicated at this point.    Dizziness. patient has been c/o dizziness regardless of position. this is not associated with orthostatic hypotension. no dizziness this morning.    RSV related URI. resolved.       Alcohol withdrawal  on CIWA and librium taper.    RUQ pain  resolved  - CMP WNL   - RUQ without acute findings    VTE prophylaxis- Lovenox    Dispo: Home pending cardiac meds optimization

## 2023-12-18 NOTE — CONSULT NOTE ADULT - ASSESSMENT
53M with alcohol abuse disorder admitted to [facility] on 12/12 for dyspnea. Found to have RSV and newly diagnosed HFrEF.     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   - Definite treatment is transplantation but patient undocumented.   - Management of heart failure and conduction abnormalities as per Cardio/HF     D/w Dr. Talbot 53M with alcohol abuse disorder admitted to [facility] on 12/12 for dyspnea. Found to have RSV and newly diagnosed HFrEF.     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   - Definite treatment is transplantation but patient undocumented.   - Management of heart failure and conduction abnormalities as per Cardio/HF     D/w Dr. Talbot 53M with alcohol abuse disorder admitted to [facility] on 12/12 for dyspnea. Found to have RSV and newly diagnosed HFrEF.     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   - Definite treatment is transplantation but patient undocumented.   - HIV neg   - Management of heart failure and conduction abnormalities as per Cardio/HF     - RVP + RSV     - Treatment is supportive    - Isolation precautions as per Infection Control policies     D/w Dr. Talbot 53M with alcohol abuse disorder admitted to [facility] on 12/12 for dyspnea. Found to have RSV and newly diagnosed HFrEF.     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   - Definite treatment is transplantation but patient undocumented.   - HIV neg   - Management of heart failure and conduction abnormalities as per Cardio/HF     - RVP + RSV     - Treatment is supportive    - Isolation precautions as per Infection Control policies     D/w Dr. Talbot

## 2023-12-18 NOTE — PROGRESS NOTE ADULT - SUBJECTIVE AND OBJECTIVE BOX
[facility] Division of Hospital Medicine    Chief Complaint:      SUBJECTIVE / OVERNIGHT EVENTS:    Patient denies any complaints. no dizziness     MEDICATIONS  (STANDING):  albuterol    90 MICROgram(s) HFA Inhaler 2 Puff(s) Inhalation every 6 hours  enoxaparin Injectable 40 milliGRAM(s) SubCutaneous every 12 hours  furosemide    Tablet 20 milliGRAM(s) Oral daily  metoprolol succinate ER 25 milliGRAM(s) Oral at bedtime  spironolactone 12.5 milliGRAM(s) Oral daily    MEDICATIONS  (PRN):  acetaminophen     Tablet .. 650 milliGRAM(s) Oral every 6 hours PRN Temp greater or equal to 38C (100.4F), Mild Pain (1 - 3)  aluminum hydroxide/magnesium hydroxide/simethicone Suspension 30 milliLiter(s) Oral every 4 hours PRN Dyspepsia  guaifenesin/dextromethorphan Oral Liquid 20 milliLiter(s) Oral four times a day PRN Cough  melatonin 3 milliGRAM(s) Oral at bedtime PRN Insomnia  ondansetron Injectable 4 milliGRAM(s) IV Push every 8 hours PRN Nausea and/or Vomiting        I&O's Summary    17 Dec 2023 07:01  -  18 Dec 2023 07:00  --------------------------------------------------------  IN: 0 mL / OUT: 1100 mL / NET: -1100 mL        PHYSICAL EXAM:  Vital Signs Last 24 Hrs  T(C): 36.9 (18 Dec 2023 08:28), Max: 37 (17 Dec 2023 22:21)  T(F): 98.4 (18 Dec 2023 08:28), Max: 98.6 (17 Dec 2023 22:21)  HR: 83 (18 Dec 2023 08:28) (79 - 102)  BP: 103/66 (18 Dec 2023 08:28) (92/60 - 103/66)  BP(mean): --  RR: 16 (18 Dec 2023 08:28) (16 - 18)  SpO2: 93% (18 Dec 2023 08:28) (93% - 95%)    Parameters below as of 18 Dec 2023 08:28  Patient On (Oxygen Delivery Method): room air            CONSTITUTIONAL: NAD  ENMT: normocephalic, atraumatic  RESPIRATORY: Normal respiratory effort; lungs are clear to auscultation bilaterally  CARDIOVASCULAR: Regular rate and rhythm, normal S1 and S2, no murmur/rub/gallop.  ABDOMEN: Nontender to palpation, no rebound/guarding, No hepatosplenomegaly  MUSCLOSKELETAL:  No edema  PSYCH: A+O to person, place, and time; affect appropriate  NEUROLOGY: CN 2-12 are intact and symmetric; no gross deficits;   SKIN: No rashes; no palpable lesions    LABS:    12-17    138  |  102  |  17.6  ----------------------------<  103<H>  4.3   |  27.0  |  0.81    Ca    8.3<L>      17 Dec 2023 07:05            Urinalysis Basic - ( 17 Dec 2023 07:05 )    Color: x / Appearance: x / SG: x / pH: x  Gluc: 103 mg/dL / Ketone: x  / Bili: x / Urobili: x   Blood: x / Protein: x / Nitrite: x   Leuk Esterase: x / RBC: x / WBC x   Sq Epi: x / Non Sq Epi: x / Bacteria: x        CAPILLARY BLOOD GLUCOSE            RADIOLOGY & ADDITIONAL TESTS:  Results Reviewed:   Imaging Personally Reviewed:  Electrocardiogram Personally Reviewed:                                           [facility] Division of Hospital Medicine    Chief Complaint:      SUBJECTIVE / OVERNIGHT EVENTS:    Patient denies any complaints. no dizziness     MEDICATIONS  (STANDING):  albuterol    90 MICROgram(s) HFA Inhaler 2 Puff(s) Inhalation every 6 hours  enoxaparin Injectable 40 milliGRAM(s) SubCutaneous every 12 hours  furosemide    Tablet 20 milliGRAM(s) Oral daily  metoprolol succinate ER 25 milliGRAM(s) Oral at bedtime  spironolactone 12.5 milliGRAM(s) Oral daily    MEDICATIONS  (PRN):  acetaminophen     Tablet .. 650 milliGRAM(s) Oral every 6 hours PRN Temp greater or equal to 38C (100.4F), Mild Pain (1 - 3)  aluminum hydroxide/magnesium hydroxide/simethicone Suspension 30 milliLiter(s) Oral every 4 hours PRN Dyspepsia  guaifenesin/dextromethorphan Oral Liquid 20 milliLiter(s) Oral four times a day PRN Cough  melatonin 3 milliGRAM(s) Oral at bedtime PRN Insomnia  ondansetron Injectable 4 milliGRAM(s) IV Push every 8 hours PRN Nausea and/or Vomiting        I&O's Summary    17 Dec 2023 07:01  -  18 Dec 2023 07:00  --------------------------------------------------------  IN: 0 mL / OUT: 1100 mL / NET: -1100 mL        PHYSICAL EXAM:  Vital Signs Last 24 Hrs  T(C): 36.9 (18 Dec 2023 08:28), Max: 37 (17 Dec 2023 22:21)  T(F): 98.4 (18 Dec 2023 08:28), Max: 98.6 (17 Dec 2023 22:21)  HR: 83 (18 Dec 2023 08:28) (79 - 102)  BP: 103/66 (18 Dec 2023 08:28) (92/60 - 103/66)  BP(mean): --  RR: 16 (18 Dec 2023 08:28) (16 - 18)  SpO2: 93% (18 Dec 2023 08:28) (93% - 95%)    Parameters below as of 18 Dec 2023 08:28  Patient On (Oxygen Delivery Method): room air            CONSTITUTIONAL: NAD  ENMT: normocephalic, atraumatic  RESPIRATORY: Normal respiratory effort; lungs are clear to auscultation bilaterally  CARDIOVASCULAR: Regular rate and rhythm, normal S1 and S2, no murmur/rub/gallop.  ABDOMEN: Nontender to palpation, no rebound/guarding, No hepatosplenomegaly  MUSCLOSKELETAL:  No edema  PSYCH: A+O to person, place, and time; affect appropriate  NEUROLOGY: CN 2-12 are intact and symmetric; no gross deficits;   SKIN: No rashes; no palpable lesions    LABS:    12-17    138  |  102  |  17.6  ----------------------------<  103<H>  4.3   |  27.0  |  0.81    Ca    8.3<L>      17 Dec 2023 07:05            Urinalysis Basic - ( 17 Dec 2023 07:05 )    Color: x / Appearance: x / SG: x / pH: x  Gluc: 103 mg/dL / Ketone: x  / Bili: x / Urobili: x   Blood: x / Protein: x / Nitrite: x   Leuk Esterase: x / RBC: x / WBC x   Sq Epi: x / Non Sq Epi: x / Bacteria: x        CAPILLARY BLOOD GLUCOSE            RADIOLOGY & ADDITIONAL TESTS:  Results Reviewed:   Imaging Personally Reviewed:  Electrocardiogram Personally Reviewed:

## 2023-12-18 NOTE — PROGRESS NOTE ADULT - SUBJECTIVE AND OBJECTIVE BOX
[facility]                                                         CARDIOLOGY AT [facility]                                                                  [address], [place] [address]                                                         Telephone: [phone]. Fax:[phone]                                                                             PROGRESS NOTE    Reason for follow up: HFrEF / NICM + RSV   Update: "feels ok " denies complaints of chest pain/sob/dizziness/palps       Review of symptoms:   Cardiac:  No chest pain. No dyspnea. No palpitations.  Respiratory: no cough. No dyspnea  Gastrointestinal: No diarrhea. No abdominal pain. No bleeding.   Neuro: No focal neuro complaints.    Vitals:  T(C): 36.9 (12-18-23 @ 08:28), Max: 37 (12-17-23 @ 22:21)  HR: 83 (12-18-23 @ 08:28) (79 - 102)  BP: 103/66 (12-18-23 @ 08:28) (92/60 - 107/66)  RR: 16 (12-18-23 @ 08:28) (16 - 18)  SpO2: 93% (12-18-23 @ 08:28) (93% - 97%)  Wt(kg): --  I&O's Summary    17 Dec 2023 07:01  -  18 Dec 2023 07:00  --------------------------------------------------------  IN: 0 mL / OUT: 1100 mL / NET: -1100 mL          PHYSICAL EXAM:  Appearance: Comfortable. No acute distress  HEENT:  Atraumatic. Normocephalic.  Normal oral mucosa  Neurologic: A & O x 3, no gross focal deficits.  Cardiovascular: RRR S1 S2 82, No murmur, no rubs/gallops. No JVD  Respiratory: , unlabored coarse bs bilat  Gastrointestinal:  Soft, Non-tender, + BS  Lower Extremities: 2+ Peripheral Pulses, No clubbing, cyanosis, or edema  Psychiatry: Patient is calm. No agitation.   Skin: warm and dry.    CURRENT CARDIAC MEDICATIONS:  furosemide    Tablet 20 milliGRAM(s) Oral every 12 hours  metoprolol succinate ER 25 milliGRAM(s) Oral at bedtime  spironolactone 12.5 milliGRAM(s) Oral daily      CURRENT OTHER MEDICATIONS:  albuterol    90 MICROgram(s) HFA Inhaler 2 Puff(s) Inhalation every 6 hours  guaifenesin/dextromethorphan Oral Liquid 20 milliLiter(s) Oral four times a day PRN Cough  acetaminophen     Tablet .. 650 milliGRAM(s) Oral every 6 hours PRN Temp greater or equal to 38C (100.4F), Mild Pain (1 - 3)  melatonin 3 milliGRAM(s) Oral at bedtime PRN Insomnia  ondansetron Injectable 4 milliGRAM(s) IV Push every 8 hours PRN Nausea and/or Vomiting  aluminum hydroxide/magnesium hydroxide/simethicone Suspension 30 milliLiter(s) Oral every 4 hours PRN Dyspepsia  enoxaparin Injectable 40 milliGRAM(s) SubCutaneous every 12 hours      LABS:	 	        12-17    138  |  102  |  17.6  ----------------------------<  103<H>  4.3   |  27.0  |  0.81    Ca    8.3<L>      17 Dec 2023 07:05      PT/INR/PTT ( 13 Dec 2023 07:18 )                       :                       :      13.2         :       30.4                  .        .                   .              .           .       1.20        .                                       Lipid Profile: Date: 12-13 @ 07:18  Total cholesterol 89; Direct LDL: --; HDL: 39; Triglycerides:68    HgA1c:   TSH: Thyroid Stimulating Hormone, Serum: 0.56 uIU/mL      TELEMETRY: RSR 80's no events     DIAGNOSTIC TESTING:  [ Echocardiogram: < from: TTE Limited W or WO Ultrasound Enhancing Agent (12.13.23 @ 17:02) >  CONCLUSIONS:      1. The left atrium is severely dilated.   2. The left ventricular volumes are severely increased.   3. Left ventricular cavity is severely dilated. Left ventricular systolic function is severely decreased with an ejection fraction visually estimated at <10 %.   4. There is severe (grade 3) left ventricular diastolic dysfunction, with elevated filling pressure.   5. The right atrium is severely dilated in size.   6. Severely enlarged right ventricular cavity size and severely reduced systolic function.   7. Mild mitral regurgitation.   8. Tricuspid valve leaflets appear normal. Severe tricuspid regurgitation.   9. Estimated pulmonary artery systolic pressure is 46 mmHg, consistent with mild pulmonary hypertension.  10. Trace pericardial effusion.  11. Team aware of the findings.  Diagnostic Findings:     Coronary Angiography 12/15/23  The coronary circulation is right dominant.    LM   Left main artery: The segment is visually normal in size and  structure. The segment is average sized. Angiography shows no  disease.    LAD   Left anterior descending artery: The segment is visually normal in  size and structure. The segment is medium to large sized.  Angiography shows no disease and the vessel wraps around the cardiac  apex.  CX   Circumflex: The segment is visually normal in size and structure. The  segment is medium sized. Angiography shows no  disease.    RCA   Right coronary artery: The segment is visually normal in size and  structure. The segment is large, dominant. Angiography  shows no disease.                                                              [facility]                                                         CARDIOLOGY AT [facility]                                                                  [address], [place] [address]                                                         Telephone: [phone]. Fax:[phone]                                                                             PROGRESS NOTE    Reason for follow up: HFrEF / NICM + RSV   Update: "feels ok " denies complaints of chest pain/sob/dizziness/palps       Review of symptoms:   Cardiac:  No chest pain. No dyspnea. No palpitations.  Respiratory: no cough. No dyspnea  Gastrointestinal: No diarrhea. No abdominal pain. No bleeding.   Neuro: No focal neuro complaints.    Vitals:  T(C): 36.9 (12-18-23 @ 08:28), Max: 37 (12-17-23 @ 22:21)  HR: 83 (12-18-23 @ 08:28) (79 - 102)  BP: 103/66 (12-18-23 @ 08:28) (92/60 - 107/66)  RR: 16 (12-18-23 @ 08:28) (16 - 18)  SpO2: 93% (12-18-23 @ 08:28) (93% - 97%)  Wt(kg): --  I&O's Summary    17 Dec 2023 07:01  -  18 Dec 2023 07:00  --------------------------------------------------------  IN: 0 mL / OUT: 1100 mL / NET: -1100 mL          PHYSICAL EXAM:  Appearance: Comfortable. No acute distress  HEENT:  Atraumatic. Normocephalic.  Normal oral mucosa  Neurologic: A & O x 3, no gross focal deficits.  Cardiovascular: RRR S1 S2 82, No murmur, no rubs/gallops. No JVD  Respiratory: , unlabored coarse bs bilat  Gastrointestinal:  Soft, Non-tender, + BS  Lower Extremities: 2+ Peripheral Pulses, No clubbing, cyanosis, or edema  Psychiatry: Patient is calm. No agitation.   Skin: warm and dry.    CURRENT CARDIAC MEDICATIONS:  furosemide    Tablet 20 milliGRAM(s) Oral every 12 hours  metoprolol succinate ER 25 milliGRAM(s) Oral at bedtime  spironolactone 12.5 milliGRAM(s) Oral daily      CURRENT OTHER MEDICATIONS:  albuterol    90 MICROgram(s) HFA Inhaler 2 Puff(s) Inhalation every 6 hours  guaifenesin/dextromethorphan Oral Liquid 20 milliLiter(s) Oral four times a day PRN Cough  acetaminophen     Tablet .. 650 milliGRAM(s) Oral every 6 hours PRN Temp greater or equal to 38C (100.4F), Mild Pain (1 - 3)  melatonin 3 milliGRAM(s) Oral at bedtime PRN Insomnia  ondansetron Injectable 4 milliGRAM(s) IV Push every 8 hours PRN Nausea and/or Vomiting  aluminum hydroxide/magnesium hydroxide/simethicone Suspension 30 milliLiter(s) Oral every 4 hours PRN Dyspepsia  enoxaparin Injectable 40 milliGRAM(s) SubCutaneous every 12 hours      LABS:	 	        12-17    138  |  102  |  17.6  ----------------------------<  103<H>  4.3   |  27.0  |  0.81    Ca    8.3<L>      17 Dec 2023 07:05      PT/INR/PTT ( 13 Dec 2023 07:18 )                       :                       :      13.2         :       30.4                  .        .                   .              .           .       1.20        .                                       Lipid Profile: Date: 12-13 @ 07:18  Total cholesterol 89; Direct LDL: --; HDL: 39; Triglycerides:68    HgA1c:   TSH: Thyroid Stimulating Hormone, Serum: 0.56 uIU/mL      TELEMETRY: RSR 80's no events     DIAGNOSTIC TESTING:  [ Echocardiogram: < from: TTE Limited W or WO Ultrasound Enhancing Agent (12.13.23 @ 17:02) >  CONCLUSIONS:      1. The left atrium is severely dilated.   2. The left ventricular volumes are severely increased.   3. Left ventricular cavity is severely dilated. Left ventricular systolic function is severely decreased with an ejection fraction visually estimated at <10 %.   4. There is severe (grade 3) left ventricular diastolic dysfunction, with elevated filling pressure.   5. The right atrium is severely dilated in size.   6. Severely enlarged right ventricular cavity size and severely reduced systolic function.   7. Mild mitral regurgitation.   8. Tricuspid valve leaflets appear normal. Severe tricuspid regurgitation.   9. Estimated pulmonary artery systolic pressure is 46 mmHg, consistent with mild pulmonary hypertension.  10. Trace pericardial effusion.  11. Team aware of the findings.  Diagnostic Findings:     Coronary Angiography 12/15/23  The coronary circulation is right dominant.    LM   Left main artery: The segment is visually normal in size and  structure. The segment is average sized. Angiography shows no  disease.    LAD   Left anterior descending artery: The segment is visually normal in  size and structure. The segment is medium to large sized.  Angiography shows no disease and the vessel wraps around the cardiac  apex.  CX   Circumflex: The segment is visually normal in size and structure. The  segment is medium sized. Angiography shows no  disease.    RCA   Right coronary artery: The segment is visually normal in size and  structure. The segment is large, dominant. Angiography  shows no disease.

## 2023-12-18 NOTE — PROGRESS NOTE ADULT - NS ATTEND AMEND GEN_ALL_CORE FT
seen with above,    53M Venezuelan-speaking history significant for chronic alcohol abuse >30yrs, last drank 2 days ago, works as , only family is a sister here, presents with worsening dyspnea/orthopnea for few days, lab work significant for pBNP 7600s and +RVP, EKG with RBBB + LAFB, Troponin negative, admitted 12/12/23 for ADHF with LV EF 10%, C with NICM, PCWP 29 was on IV Lasix for diuresis and on CIWA protocol for alcohol withdrawal  -baseline low BP limiting GDMT on low dose Toprol, spironolactone 12.5mg and uninsured not able to afford SGLT2i or veraciguat, appears euvolemic on PO Lasix 20mg reduce to once daily per HF team  -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 tomorrow, but chronic alcohol abuse, lack of social support and health insurance status limiting advanced therapies options  -no cardiac contraindication for discharge planning, follow up with HF team in the office with sliding scale  -reconsult if new general cardiology issue arise       Dallas Díaz DO, [facility]  Faculty Non-Invasive Cardiologist  [phone]. seen with above,    53M Gambian-speaking history significant for chronic alcohol abuse >30yrs, last drank 2 days ago, works as , only family is a sister here, presents with worsening dyspnea/orthopnea for few days, lab work significant for pBNP 7600s and +RVP, EKG with RBBB + LAFB, Troponin negative, admitted 12/12/23 for ADHF with LV EF 10%, C with NICM, PCWP 29 was on IV Lasix for diuresis and on CIWA protocol for alcohol withdrawal  -baseline low BP limiting GDMT on low dose Toprol, spironolactone 12.5mg and uninsured not able to afford SGLT2i or veraciguat, appears euvolemic on PO Lasix 20mg reduce to once daily per HF team  -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 tomorrow, but chronic alcohol abuse, lack of social support and health insurance status limiting advanced therapies options  -no cardiac contraindication for discharge planning, follow up with HF team in the office with sliding scale  -reconsult if new general cardiology issue arise       Dallas Díaz DO, [facility]  Faculty Non-Invasive Cardiologist  [phone].

## 2023-12-18 NOTE — PROGRESS NOTE ADULT - NS ATTEND AMEND GEN_ALL_CORE FT
Seen and examined at bedside.  GDMT optimization limited by hypotension. Hypotension is a high risk feature for his heart failure  C/w Toprol 25mg and spironolactone 12.5mg daily  Reduced lasix to 20mg daily  Will attempt low dose losartan tomorrow  Patient in uninsured. Will give him the number to the sliding scale clinic

## 2023-12-18 NOTE — PROGRESS NOTE ADULT - ASSESSMENT
Initial HF Consult: Dr. Hall    52 y/o Micronesian speaking male originally from [facility] with history of Etoh use (2 shots of tequila/day) presented to ED 12/12 with c/o dyspnea for ~1week.   Tested positive for RSV, +cough. CXR revealed clear lungs and cardiomegaly. EKG with bifascicular block, possible old inferolateral infarct and frequent ventricular ectopy. Troponin negative x 3.     Bedside POCUS revealed dilated cardiomyopathy (LVEF 10-15%, LVIDd >6.5cm), BiV dysfunction, tethereed MV with MR and right sided pleural effusion.   ProBNP 7690. Treated with IV Lasix 40mg BID. TSH wdl, HIV/Hep neg. Chagas pending.     He is s/p R/LHC 12/15/23 which showed normal coronary arteries, elevated left sided filling pressures, and CI 2.1. Will continue to optimize his GDMT and follow-up closely with him as an outpatient.    Cardiac Studies:  12/15/23 LHC: Normal coronary arteries. Ao 101/82 (87), RA 10, RV 36/11, PAP 33/24/27, PCWO 29, CO/CI 3.8/2.1, LVEDP 25.  12/15/23 RHC: RA 10, RV 36/11, PA 33/24/27, PCW 29 (v35), PA sat 64.6%, Christophe CO/CI 3.82/2.07.    12/13/23 TTE: LVIDd 6.7cm, LVEF <10%, grade III DD, severe RVE with severe RV dysfunction (TAPSE 1.0cm), severe RODNEY, mild MR, severe TR, mod-severe ND, est PASP 46 mmHg. Initial HF Consult: Dr. Hall    52 y/o Romanian speaking male originally from [facility] with history of Etoh use (2 shots of tequila/day) presented to ED 12/12 with c/o dyspnea for ~1week.   Tested positive for RSV, +cough. CXR revealed clear lungs and cardiomegaly. EKG with bifascicular block, possible old inferolateral infarct and frequent ventricular ectopy. Troponin negative x 3.     Bedside POCUS revealed dilated cardiomyopathy (LVEF 10-15%, LVIDd >6.5cm), BiV dysfunction, tethereed MV with MR and right sided pleural effusion.   ProBNP 7690. Treated with IV Lasix 40mg BID. TSH wdl, HIV/Hep neg. Chagas pending.     He is s/p R/LHC 12/15/23 which showed normal coronary arteries, elevated left sided filling pressures, and CI 2.1. Will continue to optimize his GDMT and follow-up closely with him as an outpatient.    Cardiac Studies:  12/15/23 LHC: Normal coronary arteries. Ao 101/82 (87), RA 10, RV 36/11, PAP 33/24/27, PCWO 29, CO/CI 3.8/2.1, LVEDP 25.  12/15/23 RHC: RA 10, RV 36/11, PA 33/24/27, PCW 29 (v35), PA sat 64.6%, Christophe CO/CI 3.82/2.07.    12/13/23 TTE: LVIDd 6.7cm, LVEF <10%, grade III DD, severe RVE with severe RV dysfunction (TAPSE 1.0cm), severe RODNEY, mild MR, severe TR, mod-severe MS, est PASP 46 mmHg. Initial HF Consult: Dr. Hall    52 y/o Swazi speaking male originally from [facility] with history of Etoh use (2 shots of tequila/day) presented to ED 12/12 with c/o dyspnea for ~1week.   Tested positive for RSV, +cough. CXR revealed clear lungs and cardiomegaly. EKG with bifascicular block, possible old inferolateral infarct and frequent ventricular ectopy. Troponin negative x 3.     Bedside POCUS revealed dilated cardiomyopathy (LVEF 10-15%, LVIDd >6.5cm), BiV dysfunction, tethereed MV with MR and right sided pleural effusion.   ProBNP 7690. Treated with IV Lasix 40mg BID. TSH wdl, HIV/Hep neg. 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  He is s/p R/LHC 12/15/23 which showed normal coronary arteries, elevated left sided filling pressures, and CI 2.1. Will continue to optimize his GDMT and follow-up closely with him as an outpatient.    Cardiac Studies:  12/15/23 LHC: Normal coronary arteries. Ao 101/82 (87), RA 10, RV 36/11, PAP 33/24/27, PCWO 29, CO/CI 3.8/2.1, LVEDP 25.  12/15/23 RHC: RA 10, RV 36/11, PA 33/24/27, PCW 29 (v35), PA sat 64.6%, Christophe CO/CI 3.82/2.07.    12/13/23 TTE: LVIDd 6.7cm, LVEF <10%, grade III DD, severe RVE with severe RV dysfunction (TAPSE 1.0cm), severe RODNEY, mild MR, severe TR, mod-severe PA, est PASP 46 mmHg. Initial HF Consult: Dr. Hall    52 y/o Ukrainian speaking male originally from [facility] with history of Etoh use (2 shots of tequila/day) presented to ED 12/12 with c/o dyspnea for ~1week.   Tested positive for RSV, +cough. CXR revealed clear lungs and cardiomegaly. EKG with bifascicular block, possible old inferolateral infarct and frequent ventricular ectopy. Troponin negative x 3.     Bedside POCUS revealed dilated cardiomyopathy (LVEF 10-15%, LVIDd >6.5cm), BiV dysfunction, tethereed MV with MR and right sided pleural effusion.   ProBNP 7690. Treated with IV Lasix 40mg BID. TSH wdl, HIV/Hep neg. 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  He is s/p R/LHC 12/15/23 which showed normal coronary arteries, elevated left sided filling pressures, and CI 2.1. Will continue to optimize his GDMT and follow-up closely with him as an outpatient.    Cardiac Studies:  12/15/23 LHC: Normal coronary arteries. Ao 101/82 (87), RA 10, RV 36/11, PAP 33/24/27, PCWO 29, CO/CI 3.8/2.1, LVEDP 25.  12/15/23 RHC: RA 10, RV 36/11, PA 33/24/27, PCW 29 (v35), PA sat 64.6%, Christophe CO/CI 3.82/2.07.    12/13/23 TTE: LVIDd 6.7cm, LVEF <10%, grade III DD, severe RVE with severe RV dysfunction (TAPSE 1.0cm), severe RODNEY, mild MR, severe TR, mod-severe AL, est PASP 46 mmHg.

## 2023-12-18 NOTE — PROGRESS NOTE ADULT - ASSESSMENT
This is a 53 y/p male with moderate EtOH use, presented to hospital with BARCENAS and PND x one week  trops negative  Ekg NSR pvc Rbbb Lafb t wave lateral changes  Echo EF <10% bi atrial enlargement    Cath normal coronaries  RA=10 mmHg; RV=36/11 mmHg; PCWP=29 mmHg; PVR=0 RAMIREZ Mild Post-capillary Pulmonary Hypertension=33/24=27 mmHg Reduced CO=3.8 L/min and CI=2.1 L/min/m2    Course complicated by + RSV and Etoh withdrawal requiring clonopin    Trypanosoma Ab +      Problem/Plan - 1:  ·  Problem: Acute HFrEF (heart failure with reduced ejection fraction). 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   appears euvolemic and hemodynamically stable   Continue GDMT: lasix, losartan , aldactone metoprolol / as tolerated   Trypanosoma Ab +/ discussed with ID and HF teams  -pt does not qualify for antiparasitic RX due to advanced HF EF 10%  d/w Dr Díaz      This is a 53 y/p male with moderate EtOH use, presented to hospital with BARCENAS and PND x one week  trops negative  Ekg NSR pvc Rbbb Lafb t wave lateral changes  Echo EF <10% bi atrial enlargement    Cath normal coronaries  RA=10 mmHg; RV=36/11 mmHg; PCWP=29 mmHg; PVR=0 RAMIREZ Mild Post-capillary Pulmonary Hypertension=33/24=27 mmHg Reduced CO=3.8 L/min and CI=2.1 L/min/m2    Course complicated by + RSV and Etoh withdrawal requiring clonopin    Trypanosoma Ab +      Problem/Plan - 1:  ·  Problem: Acute HFrEF (heart failure with reduced ejection fraction). 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   appears euvolemic and hemodynamically stable   Continue GDMT: lasix, losartan , aldactone metoprolol / as tolerated   Trypanosoma Ab +/ discussed with ID and HF teams  -pt does not qualify for antiparasitic RX due to advanced HF EF 10%  d/w Dr Díaz      This is a 53 y/p male with moderate EtOH use, presented to hospital with BARCENAS and PND x one week  trops negative  Ekg NSR pvc Rbbb Lafb t wave lateral changes  Echo EF <10% bi atrial enlargement    Cath normal coronaries  RA=10 mmHg; RV=36/11 mmHg; PCWP=29 mmHg; PVR=0 RAMIREZ Mild Post-capillary Pulmonary Hypertension=33/24=27 mmHg Reduced CO=3.8 L/min and CI=2.1 L/min/m2    Course complicated by + RSV and Etoh withdrawal requiring clonopin    Trypanosoma Ab +      Problem/Plan - 1:  ·  Problem: Acute HFrEF (heart failure with reduced ejection fraction). 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   appears euvolemic and hemodynamically stable   Continue GDMT: lasix, losartan , aldactone metoprolol / as tolerated   Trypanosoma Ab +/ discussed with ID and HF teams  -pt does not qualify for antiparasitic RX due to advanced HF EF 10%  -HF recs noted / will f/u with HF team post d/c   d/w Dr Díaz      This is a 53 y/p male with moderate EtOH use, presented to hospital with BARCENAS and PND x one week  trops negative  Ekg NSR pvc Rbbb Lafb t wave lateral changes  Echo EF <10% bi atrial enlargement    Cath normal coronaries  RA=10 mmHg; RV=36/11 mmHg; PCWP=29 mmHg; PVR=0 RAMIREZ Mild Post-capillary Pulmonary Hypertension=33/24=27 mmHg Reduced CO=3.8 L/min and CI=2.1 L/min/m2    Course complicated by + RSV and Etoh withdrawal requiring clonopin    Trypanosoma Ab +      Problem/Plan - 1:  ·  Problem: Acute HFrEF (heart failure with reduced ejection fraction). 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

## 2023-12-18 NOTE — PROGRESS NOTE ADULT - SUBJECTIVE AND OBJECTIVE BOX
NOTE INCOMPLETE    [facility] Advanced Heart Failure - Progress Note  [address] [place] [address]  Office Phone: [phone]/Fax: [phone]  Service/On Call Phone [phone]    Subjective/Objective:    Medications:  acetaminophen     Tablet .. 650 milliGRAM(s) Oral every 6 hours PRN  albuterol    90 MICROgram(s) HFA Inhaler 2 Puff(s) Inhalation every 6 hours  aluminum hydroxide/magnesium hydroxide/simethicone Suspension 30 milliLiter(s) Oral every 4 hours PRN  enoxaparin Injectable 40 milliGRAM(s) SubCutaneous every 12 hours  furosemide    Tablet 20 milliGRAM(s) Oral daily  guaifenesin/dextromethorphan Oral Liquid 20 milliLiter(s) Oral four times a day PRN  melatonin 3 milliGRAM(s) Oral at bedtime PRN  metoprolol succinate ER 25 milliGRAM(s) Oral at bedtime  ondansetron Injectable 4 milliGRAM(s) IV Push every 8 hours PRN  spironolactone 12.5 milliGRAM(s) Oral daily    Vital Signs Last 24 Hours  T(C): 36.9 (12-18-23 @ 08:28), Max: 37 (12-17-23 @ 22:21)  HR: 83 (12-18-23 @ 08:28) (79 - 102)  BP: 103/66 (12-18-23 @ 08:28) (92/60 - 107/66)  RR: 16 (12-18-23 @ 08:28) (16 - 18)  SpO2: 93% (12-18-23 @ 08:28) (93% - 97%)    I&O's Summary  17 Dec 2023 07:01  -  18 Dec 2023 07:00  --------------------------------------------------------  IN: 0 mL / OUT: 1100 mL / NET: -1100 mL    Tele:    Physical Exam:  General: In no distress.   HEENT: EOMs intact.  Neck: Neck supple. JVP not elevated.   Chest: Clear to auscultation bilaterally.  CV: RRR. Normal S1 and S2. No murmurs, rub, or gallops. Warm peripherally.  PV: No LE edema noted. Pulses full/equal in all four extremities.  Abdomen: Soft, non-distended, non-tender.  Skin: warm, dry, no rash.  Neurology: Alert and oriented times three. Sensation intact.  Psych: Appropriate affect.    Labs:    12-17    138  |  102  |  17.6  ----------------------------<  103<H>  4.3   |  27.0  |  0.81    Ca    8.3<L>      17 Dec 2023 07:05                         NOTE INCOMPLETE    [facility] Advanced Heart Failure - Progress Note  [address] [place] [address]  Office Phone: [phone]/Fax: [phone]  Service/On Call Phone [phone]    Subjective/Objective:    Medications:  acetaminophen     Tablet .. 650 milliGRAM(s) Oral every 6 hours PRN  albuterol    90 MICROgram(s) HFA Inhaler 2 Puff(s) Inhalation every 6 hours  aluminum hydroxide/magnesium hydroxide/simethicone Suspension 30 milliLiter(s) Oral every 4 hours PRN  enoxaparin Injectable 40 milliGRAM(s) SubCutaneous every 12 hours  furosemide    Tablet 20 milliGRAM(s) Oral daily  guaifenesin/dextromethorphan Oral Liquid 20 milliLiter(s) Oral four times a day PRN  melatonin 3 milliGRAM(s) Oral at bedtime PRN  metoprolol succinate ER 25 milliGRAM(s) Oral at bedtime  ondansetron Injectable 4 milliGRAM(s) IV Push every 8 hours PRN  spironolactone 12.5 milliGRAM(s) Oral daily    Vital Signs Last 24 Hours  T(C): 36.9 (12-18-23 @ 08:28), Max: 37 (12-17-23 @ 22:21)  HR: 83 (12-18-23 @ 08:28) (79 - 102)  BP: 103/66 (12-18-23 @ 08:28) (92/60 - 107/66)  RR: 16 (12-18-23 @ 08:28) (16 - 18)  SpO2: 93% (12-18-23 @ 08:28) (93% - 97%)    I&O's Summary  17 Dec 2023 07:01  -  18 Dec 2023 07:00  --------------------------------------------------------  IN: 0 mL / OUT: 1100 mL / NET: -1100 mL    Tele:    Physical Exam:  General: In no distress.   HEENT: EOMs intact.  Neck: Neck supple. JVP not elevated.   Chest: Clear to auscultation bilaterally.  CV: RRR. Normal S1 and S2. No murmurs, rub, or gallops. Warm peripherally.  PV: No LE edema noted. Pulses full/equal in all four extremities.  Abdomen: Soft, non-distended, non-tender.  Skin: warm, dry, no rash.  Neurology: Alert and oriented times three. Sensation intact.  Psych: Appropriate affect.    Labs:    12-17    138  |  102  |  17.6  ----------------------------<  103<H>  4.3   |  27.0  |  0.81    Ca    8.3<L>      17 Dec 2023 07:05                         [facility] Advanced Heart Failure - Progress Note  [address] [place] [address]  Office Phone: [phone]/Fax: [phone]  Service/On Call Phone [phone]    Subjective/Objective: NAEO. Pt. denies overt HF symptoms, denies dizziness/LH.    Medications:  acetaminophen     Tablet .. 650 milliGRAM(s) Oral every 6 hours PRN  albuterol    90 MICROgram(s) HFA Inhaler 2 Puff(s) Inhalation every 6 hours  aluminum hydroxide/magnesium hydroxide/simethicone Suspension 30 milliLiter(s) Oral every 4 hours PRN  enoxaparin Injectable 40 milliGRAM(s) SubCutaneous every 12 hours  furosemide    Tablet 20 milliGRAM(s) Oral daily  guaifenesin/dextromethorphan Oral Liquid 20 milliLiter(s) Oral four times a day PRN  melatonin 3 milliGRAM(s) Oral at bedtime PRN  metoprolol succinate ER 25 milliGRAM(s) Oral at bedtime  ondansetron Injectable 4 milliGRAM(s) IV Push every 8 hours PRN  spironolactone 12.5 milliGRAM(s) Oral daily    Vital Signs Last 24 Hours  T(C): 36.9 (12-18-23 @ 08:28), Max: 37 (12-17-23 @ 22:21)  HR: 83 (12-18-23 @ 08:28) (79 - 102)  BP: 103/66 (12-18-23 @ 08:28) (92/60 - 107/66)  RR: 16 (12-18-23 @ 08:28) (16 - 18)  SpO2: 93% (12-18-23 @ 08:28) (93% - 97%)    I&O's Summary  17 Dec 2023 07:01  -  18 Dec 2023 07:00  --------------------------------------------------------  IN: 0 mL / OUT: 1100 mL / NET: -1100 mL    Physical Exam:  General: In no distress.   HEENT: EOMs intact.  Neck: Neck supple. JVP not elevated.   Chest: Clear to auscultation bilaterally.  CV: RRR. Normal S1 and S2. No murmurs, rub, or gallops. Warm peripherally.  PV: No LE edema noted. Pulses full/equal in all four extremities.  Abdomen: Soft, non-distended, non-tender.  Skin: warm, dry, no rash.  Neurology: Alert and oriented times three. Sensation intact.  Psych: Appropriate affect.    Labs:    12-17    138  |  102  |  17.6  ----------------------------<  103<H>  4.3   |  27.0  |  0.81    Ca    8.3<L>      17 Dec 2023 07:05                         [facility] Advanced Heart Failure - Progress Note  [address] [place] [address]  Office Phone: [phone]/Fax: [phone]  Service/On Call Phone [phone]    Subjective/Objective: NAEO. Pt. denies overt HF symptoms, denies dizziness/LH.    Medications:  acetaminophen     Tablet .. 650 milliGRAM(s) Oral every 6 hours PRN  albuterol    90 MICROgram(s) HFA Inhaler 2 Puff(s) Inhalation every 6 hours  aluminum hydroxide/magnesium hydroxide/simethicone Suspension 30 milliLiter(s) Oral every 4 hours PRN  enoxaparin Injectable 40 milliGRAM(s) SubCutaneous every 12 hours  furosemide    Tablet 20 milliGRAM(s) Oral daily  guaifenesin/dextromethorphan Oral Liquid 20 milliLiter(s) Oral four times a day PRN  melatonin 3 milliGRAM(s) Oral at bedtime PRN  metoprolol succinate ER 25 milliGRAM(s) Oral at bedtime  ondansetron Injectable 4 milliGRAM(s) IV Push every 8 hours PRN  spironolactone 12.5 milliGRAM(s) Oral daily    Vital Signs Last 24 Hours  T(C): 36.9 (12-18-23 @ 08:28), Max: 37 (12-17-23 @ 22:21)  HR: 83 (12-18-23 @ 08:28) (79 - 102)  BP: 103/66 (12-18-23 @ 08:28) (92/60 - 107/66)  RR: 16 (12-18-23 @ 08:28) (16 - 18)  SpO2: 93% (12-18-23 @ 08:28) (93% - 97%)    I&O's Summary  17 Dec 2023 07:01  -  18 Dec 2023 07:00  --------------------------------------------------------  IN: 0 mL / OUT: 1100 mL / NET: -1100 mL    Physical Exam:  General: In no distress.   HEENT: EOMs intact.  Neck: Neck supple. JVP not elevated.   Chest: Clear to auscultation bilaterally.  CV: RRR. Normal S1 and S2. No murmurs, rub, or gallops. Warm peripherally.  PV: No LE edema noted. Pulses full/equal in all four extremities.  Abdomen: Soft, non-distended, non-tender.  Skin: warm, dry, no rash.  Neurology: Alert and oriented times three. Sensation intact.  Psych: Appropriate affect.    Labs:    12-17    138  |  102  |  17.6  ----------------------------<  103<H>  4.3   |  27.0  |  0.81    Ca    8.3<L>      17 Dec 2023 07:05

## 2023-12-18 NOTE — PROGRESS NOTE ADULT - PROBLEM SELECTOR PLAN 1
ACC/AHA Stage C, NYHA class II-III symptoms  Newly diagnosed dilated cardiomyopathy (LVEF <10%, LVIDd 6.7cm, severe RV dysfunction)  [facility] with normal coronaries. 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  Clinically euvolemic w/ warm extremities.  Preserved end organ function.  GDMT: Limited by symptomatic hypotension. Will reduce Lasix in effort to start afterload reduction. Continue reduced dose of Spironolactone 12.5 mg daily and Toprol XL 25 mg daily. SGLT2i will be cost prohibitive given the patient is uninsured.   Diuretics: Reduce Lasix to 20mg daily   Low Na+, FR diet recommended.  Will arrange follow-up with our HF team in [place] prior to discharge (he should also be referred to apply for sliding scale since he has no insurance). ACC/AHA Stage C, NYHA class II-III symptoms  Newly diagnosed dilated cardiomyopathy (LVEF <10%, LVIDd 6.7cm, severe RV dysfunction)  [facility] with normal coronaries. 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  Clinically euvolemic w/ warm extremities.  Preserved end organ function.  GDMT: Limited by symptomatic hypotension. Will reduce Lasix in effort to start afterload reduction. Continue reduced dose of Spironolactone 12.5 mg daily and Toprol XL 25 mg daily. SGLT2i will be cost prohibitive given the patient is uninsured.   Diuretics: Reduce Lasix to 20mg daily   Low Na+, FR diet recommended.  Will arrange follow-up with our HF team in [place] prior to discharge (he should also be referred to apply for sliding scale since he has no insurance). ACC/AHA Stage C, NYHA class II-III symptoms  Newly diagnosed dilated cardiomyopathy (LVEF <10%, LVIDd 6.7cm, severe RV dysfunction)  [facility] with normal coronaries. 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  Clinically euvolemic w/ warm extremities.  Preserved end organ function.  GDMT: Limited by symptomatic hypotension. Orthostatic BP's negative yesterday. Will reduce Lasix in effort to start afterload reduction. Continue reduced dose of Spironolactone 12.5 mg daily and Toprol XL 25 mg daily. SGLT2i will be cost prohibitive given the patient is uninsured.   Diuretics: Reduce Lasix to 20mg daily   Low Na+, FR diet recommended.  Will arrange follow-up with our HF team in [place] prior to discharge (he should also be referred to apply for sliding scale since he has no insurance). ACC/AHA Stage C, NYHA class II-III symptoms  Newly diagnosed dilated cardiomyopathy (LVEF <10%, LVIDd 6.7cm, severe RV dysfunction)  [facility] with normal coronaries. 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  Clinically euvolemic w/ warm extremities.  Preserved end organ function.  GDMT: Limited by symptomatic hypotension. Orthostatic BP's negative yesterday. Will reduce Lasix in effort to start afterload reduction. Continue reduced dose of Spironolactone 12.5 mg daily and Toprol XL 25 mg daily. SGLT2i will be cost prohibitive given the patient is uninsured.   Diuretics: Reduce Lasix to 20mg daily   Low Na+, FR diet recommended.  Will arrange follow-up with our HF team in [place] prior to discharge (he should also be referred to apply for sliding scale since he has no insurance).

## 2023-12-19 LAB
ALBUMIN SERPL ELPH-MCNC: 3.4 G/DL — SIGNIFICANT CHANGE UP (ref 3.3–5.2)
ALBUMIN SERPL ELPH-MCNC: 3.4 G/DL — SIGNIFICANT CHANGE UP (ref 3.3–5.2)
ALP SERPL-CCNC: 113 U/L — SIGNIFICANT CHANGE UP (ref 40–120)
ALP SERPL-CCNC: 113 U/L — SIGNIFICANT CHANGE UP (ref 40–120)
ALT FLD-CCNC: 38 U/L — SIGNIFICANT CHANGE UP
ALT FLD-CCNC: 38 U/L — SIGNIFICANT CHANGE UP
AMMONIA BLD-MCNC: 23 UMOL/L — SIGNIFICANT CHANGE UP (ref 11–55)
AMMONIA BLD-MCNC: 23 UMOL/L — SIGNIFICANT CHANGE UP (ref 11–55)
ANION GAP SERPL CALC-SCNC: 11 MMOL/L — SIGNIFICANT CHANGE UP (ref 5–17)
AST SERPL-CCNC: 50 U/L — HIGH
AST SERPL-CCNC: 50 U/L — HIGH
BILIRUB SERPL-MCNC: 0.6 MG/DL — SIGNIFICANT CHANGE UP (ref 0.4–2)
BILIRUB SERPL-MCNC: 0.6 MG/DL — SIGNIFICANT CHANGE UP (ref 0.4–2)
BUN SERPL-MCNC: 18.9 MG/DL — SIGNIFICANT CHANGE UP (ref 8–20)
BUN SERPL-MCNC: 18.9 MG/DL — SIGNIFICANT CHANGE UP (ref 8–20)
BUN SERPL-MCNC: 21.6 MG/DL — HIGH (ref 8–20)
BUN SERPL-MCNC: 21.6 MG/DL — HIGH (ref 8–20)
CALCIUM SERPL-MCNC: 9.1 MG/DL — SIGNIFICANT CHANGE UP (ref 8.4–10.5)
CHLORIDE SERPL-SCNC: 101 MMOL/L — SIGNIFICANT CHANGE UP (ref 96–108)
CHLORIDE SERPL-SCNC: 101 MMOL/L — SIGNIFICANT CHANGE UP (ref 96–108)
CHLORIDE SERPL-SCNC: 97 MMOL/L — SIGNIFICANT CHANGE UP (ref 96–108)
CHLORIDE SERPL-SCNC: 97 MMOL/L — SIGNIFICANT CHANGE UP (ref 96–108)
CO2 SERPL-SCNC: 25 MMOL/L — SIGNIFICANT CHANGE UP (ref 22–29)
CO2 SERPL-SCNC: 25 MMOL/L — SIGNIFICANT CHANGE UP (ref 22–29)
CO2 SERPL-SCNC: 29 MMOL/L — SIGNIFICANT CHANGE UP (ref 22–29)
CO2 SERPL-SCNC: 29 MMOL/L — SIGNIFICANT CHANGE UP (ref 22–29)
CREAT SERPL-MCNC: 0.8 MG/DL — SIGNIFICANT CHANGE UP (ref 0.5–1.3)
CREAT SERPL-MCNC: 0.8 MG/DL — SIGNIFICANT CHANGE UP (ref 0.5–1.3)
CREAT SERPL-MCNC: 1.16 MG/DL — SIGNIFICANT CHANGE UP (ref 0.5–1.3)
CREAT SERPL-MCNC: 1.16 MG/DL — SIGNIFICANT CHANGE UP (ref 0.5–1.3)
EGFR: 105 ML/MIN/1.73M2 — SIGNIFICANT CHANGE UP
EGFR: 105 ML/MIN/1.73M2 — SIGNIFICANT CHANGE UP
EGFR: 75 ML/MIN/1.73M2 — SIGNIFICANT CHANGE UP
EGFR: 75 ML/MIN/1.73M2 — SIGNIFICANT CHANGE UP
GLUCOSE SERPL-MCNC: 109 MG/DL — HIGH (ref 70–99)
GLUCOSE SERPL-MCNC: 109 MG/DL — HIGH (ref 70–99)
GLUCOSE SERPL-MCNC: 149 MG/DL — HIGH (ref 70–99)
GLUCOSE SERPL-MCNC: 149 MG/DL — HIGH (ref 70–99)
HCT VFR BLD CALC: 50.7 % — HIGH (ref 39–50)
HCT VFR BLD CALC: 50.7 % — HIGH (ref 39–50)
HGB BLD-MCNC: 17.9 G/DL — HIGH (ref 13–17)
HGB BLD-MCNC: 17.9 G/DL — HIGH (ref 13–17)
MAGNESIUM SERPL-MCNC: 2 MG/DL — SIGNIFICANT CHANGE UP (ref 1.6–2.6)
MAGNESIUM SERPL-MCNC: 2 MG/DL — SIGNIFICANT CHANGE UP (ref 1.6–2.6)
MCHC RBC-ENTMCNC: 31.8 PG — SIGNIFICANT CHANGE UP (ref 27–34)
MCHC RBC-ENTMCNC: 31.8 PG — SIGNIFICANT CHANGE UP (ref 27–34)
MCHC RBC-ENTMCNC: 35.3 GM/DL — SIGNIFICANT CHANGE UP (ref 32–36)
MCHC RBC-ENTMCNC: 35.3 GM/DL — SIGNIFICANT CHANGE UP (ref 32–36)
MCV RBC AUTO: 90.1 FL — SIGNIFICANT CHANGE UP (ref 80–100)
MCV RBC AUTO: 90.1 FL — SIGNIFICANT CHANGE UP (ref 80–100)
PHOSPHATE SERPL-MCNC: 4.2 MG/DL — SIGNIFICANT CHANGE UP (ref 2.4–4.7)
PHOSPHATE SERPL-MCNC: 4.2 MG/DL — SIGNIFICANT CHANGE UP (ref 2.4–4.7)
PLATELET # BLD AUTO: 229 K/UL — SIGNIFICANT CHANGE UP (ref 150–400)
PLATELET # BLD AUTO: 229 K/UL — SIGNIFICANT CHANGE UP (ref 150–400)
POTASSIUM SERPL-MCNC: 4.5 MMOL/L — SIGNIFICANT CHANGE UP (ref 3.5–5.3)
POTASSIUM SERPL-MCNC: 4.5 MMOL/L — SIGNIFICANT CHANGE UP (ref 3.5–5.3)
POTASSIUM SERPL-MCNC: 5.6 MMOL/L — HIGH (ref 3.5–5.3)
POTASSIUM SERPL-MCNC: 5.6 MMOL/L — HIGH (ref 3.5–5.3)
POTASSIUM SERPL-SCNC: 4.5 MMOL/L — SIGNIFICANT CHANGE UP (ref 3.5–5.3)
POTASSIUM SERPL-SCNC: 4.5 MMOL/L — SIGNIFICANT CHANGE UP (ref 3.5–5.3)
POTASSIUM SERPL-SCNC: 5.6 MMOL/L — HIGH (ref 3.5–5.3)
POTASSIUM SERPL-SCNC: 5.6 MMOL/L — HIGH (ref 3.5–5.3)
PROT SERPL-MCNC: 7 G/DL — SIGNIFICANT CHANGE UP (ref 6.6–8.7)
PROT SERPL-MCNC: 7 G/DL — SIGNIFICANT CHANGE UP (ref 6.6–8.7)
RBC # BLD: 5.63 M/UL — SIGNIFICANT CHANGE UP (ref 4.2–5.8)
RBC # BLD: 5.63 M/UL — SIGNIFICANT CHANGE UP (ref 4.2–5.8)
RBC # FLD: 12.5 % — SIGNIFICANT CHANGE UP (ref 10.3–14.5)
RBC # FLD: 12.5 % — SIGNIFICANT CHANGE UP (ref 10.3–14.5)
SODIUM SERPL-SCNC: 137 MMOL/L — SIGNIFICANT CHANGE UP (ref 135–145)
WBC # BLD: 4.94 K/UL — SIGNIFICANT CHANGE UP (ref 3.8–10.5)
WBC # BLD: 4.94 K/UL — SIGNIFICANT CHANGE UP (ref 3.8–10.5)
WBC # FLD AUTO: 4.94 K/UL — SIGNIFICANT CHANGE UP (ref 3.8–10.5)
WBC # FLD AUTO: 4.94 K/UL — SIGNIFICANT CHANGE UP (ref 3.8–10.5)

## 2023-12-19 PROCEDURE — 99232 SBSQ HOSP IP/OBS MODERATE 35: CPT

## 2023-12-19 RX ORDER — LOSARTAN POTASSIUM 100 MG/1
12.5 TABLET, FILM COATED ORAL DAILY
Refills: 0 | Status: DISCONTINUED | OUTPATIENT
Start: 2023-12-19 | End: 2023-12-20

## 2023-12-19 RX ADMIN — ENOXAPARIN SODIUM 40 MILLIGRAM(S): 100 INJECTION SUBCUTANEOUS at 23:14

## 2023-12-19 RX ADMIN — LOSARTAN POTASSIUM 12.5 MILLIGRAM(S): 100 TABLET, FILM COATED ORAL at 12:12

## 2023-12-19 RX ADMIN — Medication 25 MILLIGRAM(S): at 23:14

## 2023-12-19 RX ADMIN — ENOXAPARIN SODIUM 40 MILLIGRAM(S): 100 INJECTION SUBCUTANEOUS at 09:42

## 2023-12-19 NOTE — PROGRESS NOTE ADULT - ASSESSMENT
53/M, alcohol use disorder (2-3 rum shots)/ night, last drink 12/10, no other significant pmhx presented to the ED for complaints of dyspnea on exertion which began approximately 1 week ago. RVP- RSV +. Also found to have acute systolic heart failure, pending cardiac cath.     Chronic systolic heart failure 2/2 possible chronic chagas disease  This is new diagnosis for him, acute element has resolved. TTE with EF <10%. LHC showed normal coronaries. right heart cath with elevated pressures.  Low BP limiting GDMT so they are being introduced slowly at a low dose. c/w lasix, aldactone and metoprolol XL. started losartan today. trypanosoma cruzi IgG Ab positive. no specific treatment indicated at this point.    Dizziness. resolved. patient has been c/o dizziness regardless of position. this was not associated with orthostatic hypotension.     RSV related URI. resolved.       Alcohol withdrawal  s/p CIWA and librium taper.    RUQ pain  resolved  - CMP WNL   - RUQ without acute findings    VTE prophylaxis- Lovenox    Dispo: Home pending cardiac meds optimization

## 2023-12-19 NOTE — PHYSICAL THERAPY INITIAL EVALUATION ADULT - GENERAL OBSERVATIONS, REHAB EVAL
awake in chair on room air, +telemonitor. , wayne(#[phone]), in use throughout. awake in chair on room air, +telemonitor. , wayne(#[phone]), in use throughout.

## 2023-12-19 NOTE — PHYSICAL THERAPY INITIAL EVALUATION ADULT - PERTINENT HX OF CURRENT PROBLEM, REHAB EVAL
53/M, alcohol use disorder (2-3 rum shots)/ night, last drink 12/10, no other significant pmhx presented to the ED for complaints of dyspnea on exertion which began approximately 1 week ago. RVP- RSV +. Also found to have acute systolic heart failure, pending cardiac cath.

## 2023-12-19 NOTE — PROGRESS NOTE ADULT - SUBJECTIVE AND OBJECTIVE BOX
[facility] Division of Hospital Medicine    Chief Complaint:      SUBJECTIVE / OVERNIGHT EVENTS:    Patient denies any complaints    MEDICATIONS  (STANDING):  albuterol    90 MICROgram(s) HFA Inhaler 2 Puff(s) Inhalation every 6 hours  enoxaparin Injectable 40 milliGRAM(s) SubCutaneous every 12 hours  furosemide    Tablet 20 milliGRAM(s) Oral daily  losartan 12.5 milliGRAM(s) Oral daily  metoprolol succinate ER 25 milliGRAM(s) Oral at bedtime  spironolactone 12.5 milliGRAM(s) Oral daily    MEDICATIONS  (PRN):  acetaminophen     Tablet .. 650 milliGRAM(s) Oral every 6 hours PRN Temp greater or equal to 38C (100.4F), Mild Pain (1 - 3)  aluminum hydroxide/magnesium hydroxide/simethicone Suspension 30 milliLiter(s) Oral every 4 hours PRN Dyspepsia  guaifenesin/dextromethorphan Oral Liquid 20 milliLiter(s) Oral four times a day PRN Cough  melatonin 3 milliGRAM(s) Oral at bedtime PRN Insomnia  ondansetron Injectable 4 milliGRAM(s) IV Push every 8 hours PRN Nausea and/or Vomiting        I&O's Summary    18 Dec 2023 07:01  -  19 Dec 2023 07:00  --------------------------------------------------------  IN: 600 mL / OUT: 1500 mL / NET: -900 mL        PHYSICAL EXAM:  Vital Signs Last 24 Hrs  T(C): 36.7 (19 Dec 2023 07:45), Max: 37.1 (18 Dec 2023 17:01)  T(F): 98 (19 Dec 2023 07:45), Max: 98.7 (18 Dec 2023 17:01)  HR: 87 (19 Dec 2023 12:14) (76 - 101)  BP: 106/72 (19 Dec 2023 12:14) (84/63 - 125/86)  BP(mean): 69 (18 Dec 2023 17:01) (69 - 69)  RR: 18 (19 Dec 2023 12:14) (16 - 18)  SpO2: 95% (19 Dec 2023 12:14) (91% - 100%)    Parameters below as of 19 Dec 2023 12:37  Patient On (Oxygen Delivery Method): room air            CONSTITUTIONAL: NAD  ENMT: normocephalic, atraumatic  RESPIRATORY: Normal respiratory effort; lungs are clear to auscultation bilaterally  CARDIOVASCULAR: Regular rate and rhythm, normal S1 and S2, no murmur/rub/gallop.  MUSCLOSKELETAL:  No edema  PSYCH: A+O to person, place, and time; affect appropriate  NEUROLOGY: CN 2-12 are intact and symmetric; no gross deficits;   SKIN: No rashes; no palpable lesions    LABS:                        17.9   4.94  )-----------( 229      ( 19 Dec 2023 04:45 )             50.7     12-19    137  |  101  |  18.9  ----------------------------<  109<H>  5.6<H>   |  25.0  |  0.80    Ca    9.1      19 Dec 2023 04:45  Phos  4.2     12-19  Mg     2.0     12-19            Urinalysis Basic - ( 19 Dec 2023 04:45 )    Color: x / Appearance: x / SG: x / pH: x  Gluc: 109 mg/dL / Ketone: x  / Bili: x / Urobili: x   Blood: x / Protein: x / Nitrite: x   Leuk Esterase: x / RBC: x / WBC x   Sq Epi: x / Non Sq Epi: x / Bacteria: x        CAPILLARY BLOOD GLUCOSE            RADIOLOGY & ADDITIONAL TESTS:  Results Reviewed:   Imaging Personally Reviewed:  Electrocardiogram Personally Reviewed:                                           [facility] Division of Hospital Medicine    Chief Complaint:      SUBJECTIVE / OVERNIGHT EVENTS:    Patient denies any complaints    MEDICATIONS  (STANDING):  albuterol    90 MICROgram(s) HFA Inhaler 2 Puff(s) Inhalation every 6 hours  enoxaparin Injectable 40 milliGRAM(s) SubCutaneous every 12 hours  furosemide    Tablet 20 milliGRAM(s) Oral daily  losartan 12.5 milliGRAM(s) Oral daily  metoprolol succinate ER 25 milliGRAM(s) Oral at bedtime  spironolactone 12.5 milliGRAM(s) Oral daily    MEDICATIONS  (PRN):  acetaminophen     Tablet .. 650 milliGRAM(s) Oral every 6 hours PRN Temp greater or equal to 38C (100.4F), Mild Pain (1 - 3)  aluminum hydroxide/magnesium hydroxide/simethicone Suspension 30 milliLiter(s) Oral every 4 hours PRN Dyspepsia  guaifenesin/dextromethorphan Oral Liquid 20 milliLiter(s) Oral four times a day PRN Cough  melatonin 3 milliGRAM(s) Oral at bedtime PRN Insomnia  ondansetron Injectable 4 milliGRAM(s) IV Push every 8 hours PRN Nausea and/or Vomiting        I&O's Summary    18 Dec 2023 07:01  -  19 Dec 2023 07:00  --------------------------------------------------------  IN: 600 mL / OUT: 1500 mL / NET: -900 mL        PHYSICAL EXAM:  Vital Signs Last 24 Hrs  T(C): 36.7 (19 Dec 2023 07:45), Max: 37.1 (18 Dec 2023 17:01)  T(F): 98 (19 Dec 2023 07:45), Max: 98.7 (18 Dec 2023 17:01)  HR: 87 (19 Dec 2023 12:14) (76 - 101)  BP: 106/72 (19 Dec 2023 12:14) (84/63 - 125/86)  BP(mean): 69 (18 Dec 2023 17:01) (69 - 69)  RR: 18 (19 Dec 2023 12:14) (16 - 18)  SpO2: 95% (19 Dec 2023 12:14) (91% - 100%)    Parameters below as of 19 Dec 2023 12:37  Patient On (Oxygen Delivery Method): room air            CONSTITUTIONAL: NAD  ENMT: normocephalic, atraumatic  RESPIRATORY: Normal respiratory effort; lungs are clear to auscultation bilaterally  CARDIOVASCULAR: Regular rate and rhythm, normal S1 and S2, no murmur/rub/gallop.  MUSCLOSKELETAL:  No edema  PSYCH: A+O to person, place, and time; affect appropriate  NEUROLOGY: CN 2-12 are intact and symmetric; no gross deficits;   SKIN: No rashes; no palpable lesions    LABS:                        17.9   4.94  )-----------( 229      ( 19 Dec 2023 04:45 )             50.7     12-19    137  |  101  |  18.9  ----------------------------<  109<H>  5.6<H>   |  25.0  |  0.80    Ca    9.1      19 Dec 2023 04:45  Phos  4.2     12-19  Mg     2.0     12-19            Urinalysis Basic - ( 19 Dec 2023 04:45 )    Color: x / Appearance: x / SG: x / pH: x  Gluc: 109 mg/dL / Ketone: x  / Bili: x / Urobili: x   Blood: x / Protein: x / Nitrite: x   Leuk Esterase: x / RBC: x / WBC x   Sq Epi: x / Non Sq Epi: x / Bacteria: x        CAPILLARY BLOOD GLUCOSE            RADIOLOGY & ADDITIONAL TESTS:  Results Reviewed:   Imaging Personally Reviewed:  Electrocardiogram Personally Reviewed:

## 2023-12-19 NOTE — PROGRESS NOTE ADULT - ASSESSMENT
Initial HF Consult: Dr. Hall    54 y/o Honduran speaking male originally from [facility] with history of Etoh use (2 shots of tequila/day) presented to ED 12/12 with c/o dyspnea for ~1week.   Tested positive for RSV, +cough. CXR revealed clear lungs and cardiomegaly. EKG with bifascicular block, possible old inferolateral infarct and frequent ventricular ectopy. Troponin negative x 3.     Bedside POCUS revealed dilated cardiomyopathy (LVEF 10-15%, LVIDd >6.5cm), BiV dysfunction, tethereed MV with MR and right sided pleural effusion.   ProBNP 7690. Treated with IV Lasix 40mg BID. TSH wdl, HIV/Hep neg. 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  He is s/p R/LHC 12/15/23 which showed normal coronary arteries, elevated left sided filling pressures, and CI 2.1. Will continue to optimize his GDMT and follow-up closely with him as an outpatient.    Cardiac Studies:  12/15/23 LHC: Normal coronary arteries. Ao 101/82 (87), RA 10, RV 36/11, PAP 33/24/27, PCWO 29, CO/CI 3.8/2.1, LVEDP 25.  12/15/23 RHC: RA 10, RV 36/11, PA 33/24/27, PCW 29 (v35), PA sat 64.6%, Chrsitophe CO/CI 3.82/2.07.    12/13/23 TTE: LVIDd 6.7cm, LVEF <10%, grade III DD, severe RVE with severe RV dysfunction (TAPSE 1.0cm), severe RODNEY, mild MR, severe TR, mod-severe ND, est PASP 46 mmHg. Initial HF Consult: Dr. Hall    54 y/o Luxembourger speaking male originally from [facility] with history of Etoh use (2 shots of tequila/day) presented to ED 12/12 with c/o dyspnea for ~1week.   Tested positive for RSV, +cough. CXR revealed clear lungs and cardiomegaly. EKG with bifascicular block, possible old inferolateral infarct and frequent ventricular ectopy. Troponin negative x 3.     Bedside POCUS revealed dilated cardiomyopathy (LVEF 10-15%, LVIDd >6.5cm), BiV dysfunction, tethereed MV with MR and right sided pleural effusion.   ProBNP 7690. Treated with IV Lasix 40mg BID. TSH wdl, HIV/Hep neg. 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  He is s/p R/LHC 12/15/23 which showed normal coronary arteries, elevated left sided filling pressures, and CI 2.1. Will continue to optimize his GDMT and follow-up closely with him as an outpatient.    Cardiac Studies:  12/15/23 LHC: Normal coronary arteries. Ao 101/82 (87), RA 10, RV 36/11, PAP 33/24/27, PCWO 29, CO/CI 3.8/2.1, LVEDP 25.  12/15/23 RHC: RA 10, RV 36/11, PA 33/24/27, PCW 29 (v35), PA sat 64.6%, Christophe CO/CI 3.82/2.07.    12/13/23 TTE: LVIDd 6.7cm, LVEF <10%, grade III DD, severe RVE with severe RV dysfunction (TAPSE 1.0cm), severe RODNEY, mild MR, severe TR, mod-severe VA, est PASP 46 mmHg.

## 2023-12-19 NOTE — PROGRESS NOTE ADULT - NS ATTEND AMEND GEN_ALL_CORE FT
Patient is warm and well perfused on exam  Is much more alert today  Start Losartan 12.5 mg daily, c/w toprol-XL 25 mg daily, Spironolactone 12.5 mg daily and lasix 20mg daily  Will give him the number for sliding scale office since he is not insured

## 2023-12-19 NOTE — PROGRESS NOTE ADULT - PROBLEM SELECTOR PLAN 1
ACC/AHA Stage C, NYHA class II-III symptoms  Newly diagnosed NICM, dilated cardiomyopathy (LVEF <10%, LVIDd 6.7cm, severe RV dysfunction)  [facility] with normal coronaries. Possible etiologies include Etoh abuse, +Trypanosoma AB (chronic).  Clinically euvolemic w/ warm extremities.  Preserved end organ function.  GDMT: Limited by hypotension (orthostatic BP negative) and cost due to uninsured status.     -ACEi/ARB/ARNI - Repeat K+ level today (am lab hemolyzed) with plan to start low dose Losartan 12.5 mg daily (hold for SBP <90).    -BB - Toprol-XL 25 mg daily    -MRA - Spironolactone 12.5 mg daily    -SGLT2i - deferred due to cost.    Diuretics: Lasix to 20mg daily   Low Na+, FR diet recommended.  Follow-up visit has been arranged at the [facility] in [place] on 1/2 with Dr. Odom however given significant cardiomyopathy he should also f/u in our HF clinic. He will need to apply for sliding scale (contact # [phone]. ACC/AHA Stage C, NYHA class II-III symptoms  Newly diagnosed NICM, dilated cardiomyopathy (LVEF <10%, LVIDd 6.7cm, severe RV dysfunction)  [facility] with normal coronaries. Possible etiologies include Etoh abuse, +Trypanosoma AB (chronic).  Clinically euvolemic w/ warm extremities.  Preserved end organ function.  GDMT: Limited by hypotension (orthostatic BP negative) and cost due to uninsured status.     -ACEi/ARB/ARNI - Repeat K+ level today (am lab hemolyzed) with plan to start low dose Losartan 12.5 mg daily (hold for SBP <90).    -BB - Toprol-XL 25 mg daily    -MRA - Spironolactone 12.5 mg daily    -SGLT2i - deferred due to cost.    Diuretics: Lasix to 20mg daily   Low Na+, FR diet recommended.  Follow-up visit has been arranged at the [facility] in [place] on 1/2 with Dr. Odom however given significant cardiomyopathy he should also f/u in our HF clinic. He will need to apply for sliding scale (contact # [phone].

## 2023-12-19 NOTE — PROGRESS NOTE ADULT - SUBJECTIVE AND OBJECTIVE BOX
[facility] Advanced Heart Failure - Progress Note  [address] [place] [address]  Office Phone: [phone]/Fax: [phone]  Service/On Call Phone [phone]    Subjective/Objective: NAEO. Pt. sitting up in chair, more alert than yesterday. Denies overt HF symptoms.     Medications:  acetaminophen     Tablet .. 650 milliGRAM(s) Oral every 6 hours PRN  albuterol    90 MICROgram(s) HFA Inhaler 2 Puff(s) Inhalation every 6 hours  aluminum hydroxide/magnesium hydroxide/simethicone Suspension 30 milliLiter(s) Oral every 4 hours PRN  enoxaparin Injectable 40 milliGRAM(s) SubCutaneous every 12 hours  furosemide    Tablet 20 milliGRAM(s) Oral daily  guaifenesin/dextromethorphan Oral Liquid 20 milliLiter(s) Oral four times a day PRN  losartan 12.5 milliGRAM(s) Oral daily  melatonin 3 milliGRAM(s) Oral at bedtime PRN  metoprolol succinate ER 25 milliGRAM(s) Oral at bedtime  ondansetron Injectable 4 milliGRAM(s) IV Push every 8 hours PRN  spironolactone 12.5 milliGRAM(s) Oral daily    Vital Signs Last 24 Hours  T(C): 36.7 (23 @ 07:45), Max: 37.1 (23 @ 17:01)  HR: 77 (23 @ 07:45) (76 - 101)  BP: 104/71 (23 @ 07:45) (84/63 - 125/86)  RR: 17 (23 @ 07:45) (16 - 17)  SpO2: 91% (23 @ 07:45) (91% - 100%)    Weight in k.4 ( @ 05:37)    EKG: SR, 4 beats NSVT    I&O's Summary  18 Dec 2023 07:01  -  19 Dec 2023 07:00  --------------------------------------------------------  IN: 600 mL / OUT: 1500 mL / NET: -900 mL    Physical Exam:  General: In no distress.   HEENT: EOMs intact.  Neck: Neck supple. JVP not elevated.   Chest: Clear to auscultation bilaterally.  CV: RRR. Normal S1 and S2. No murmurs, rub, or gallops. Warm peripherally.  PV: No LE edema noted. Pulses full/equal in all four extremities.  Abdomen: Soft, non-distended, non-tender.  Skin: warm, dry, no rash.  Neurology: Alert and oriented times three. Sensation intact.  Psych: Appropriate affect.    Labs:                        17.9   4.94  )-----------( 229      ( 19 Dec 2023 04:45 )             50.7     -    137  |  101  |  18.9  ----------------------------<  109<H>  5.6<H>   |  25.0  |  0.80    Ca    9.1      19 Dec 2023 04:45  Phos  4.2       Mg     2.0                              [facility] Advanced Heart Failure - Progress Note  [address] [place] [address]  Office Phone: [phone]/Fax: [phone]  Service/On Call Phone [phone]    Subjective/Objective: NAEO. Pt. sitting up in chair, more alert than yesterday. Denies overt HF symptoms.     Medications:  acetaminophen     Tablet .. 650 milliGRAM(s) Oral every 6 hours PRN  albuterol    90 MICROgram(s) HFA Inhaler 2 Puff(s) Inhalation every 6 hours  aluminum hydroxide/magnesium hydroxide/simethicone Suspension 30 milliLiter(s) Oral every 4 hours PRN  enoxaparin Injectable 40 milliGRAM(s) SubCutaneous every 12 hours  furosemide    Tablet 20 milliGRAM(s) Oral daily  guaifenesin/dextromethorphan Oral Liquid 20 milliLiter(s) Oral four times a day PRN  losartan 12.5 milliGRAM(s) Oral daily  melatonin 3 milliGRAM(s) Oral at bedtime PRN  metoprolol succinate ER 25 milliGRAM(s) Oral at bedtime  ondansetron Injectable 4 milliGRAM(s) IV Push every 8 hours PRN  spironolactone 12.5 milliGRAM(s) Oral daily    Vital Signs Last 24 Hours  T(C): 36.7 (23 @ 07:45), Max: 37.1 (23 @ 17:01)  HR: 77 (23 @ 07:45) (76 - 101)  BP: 104/71 (23 @ 07:45) (84/63 - 125/86)  RR: 17 (23 @ 07:45) (16 - 17)  SpO2: 91% (23 @ 07:45) (91% - 100%)    Weight in k.4 ( @ 05:37)    EKG: SR, 4 beats NSVT    I&O's Summary  18 Dec 2023 07:01  -  19 Dec 2023 07:00  --------------------------------------------------------  IN: 600 mL / OUT: 1500 mL / NET: -900 mL    Physical Exam:  General: In no distress.   HEENT: EOMs intact.  Neck: Neck supple. JVP not elevated.   Chest: Clear to auscultation bilaterally.  CV: RRR. Normal S1 and S2. No murmurs, rub, or gallops. Warm peripherally.  PV: No LE edema noted. Pulses full/equal in all four extremities.  Abdomen: Soft, non-distended, non-tender.  Skin: warm, dry, no rash.  Neurology: Alert and oriented times three. Sensation intact.  Psych: Appropriate affect.    Labs:                        17.9   4.94  )-----------( 229      ( 19 Dec 2023 04:45 )             50.7     -    137  |  101  |  18.9  ----------------------------<  109<H>  5.6<H>   |  25.0  |  0.80    Ca    9.1      19 Dec 2023 04:45  Phos  4.2       Mg     2.0

## 2023-12-19 NOTE — PHYSICAL THERAPY INITIAL EVALUATION ADULT - ADDITIONAL COMMENTS
pt states he lives with his brother in a 2 story house with 4 steps to enter (+rail). no DME. pt and brother both work.

## 2023-12-20 PROCEDURE — 99232 SBSQ HOSP IP/OBS MODERATE 35: CPT

## 2023-12-20 RX ORDER — LOSARTAN POTASSIUM 100 MG/1
12.5 TABLET, FILM COATED ORAL
Refills: 0 | Status: DISCONTINUED | OUTPATIENT
Start: 2023-12-20 | End: 2023-12-21

## 2023-12-20 RX ADMIN — ENOXAPARIN SODIUM 40 MILLIGRAM(S): 100 INJECTION SUBCUTANEOUS at 10:46

## 2023-12-20 RX ADMIN — ENOXAPARIN SODIUM 40 MILLIGRAM(S): 100 INJECTION SUBCUTANEOUS at 21:40

## 2023-12-20 NOTE — DIETITIAN INITIAL EVALUATION ADULT - ORAL INTAKE PTA/DIET HISTORY
Pt reports decreased appetite throughout admission and variable PO intake <25-75% at meals. Low and fluid restricted education provided. Pt reports not following CHF diet prior to admission, as new diagnosis. Provide additional education as feasible.

## 2023-12-20 NOTE — PROGRESS NOTE ADULT - ASSESSMENT
Initial HF Consult: Dr. Hall    54 y/o Kyrgyz speaking male originally from [facility] with history of Etoh use (currently endorses drinking 2 shots of tequila/day which is much less than previously) presented to ED 12/12 with c/o dyspnea for ~1week.   Tested positive for RSV, +cough. CXR revealed clear lungs and cardiomegaly. EKG with bifascicular block, possible old inferolateral infarct and frequent ventricular ectopy. Troponin negative x 3.     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   ProBNP 7690. Treated with IV Lasix 40mg BID. TSH wdl, HIV/Hep neg. 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  He is s/p R/LHC 12/15/23 which showed normal coronary arteries, elevated left sided filling pressures, and CI 2.1. Will continue to optimize his GDMT and follow-up closely with him as an outpatient.    Cardiac Studies:  12/15/23 LHC: Normal coronary arteries. Ao 101/82 (87), RA 10, RV 36/11, PAP 33/24/27, PCWO 29, CO/CI 3.8/2.1, LVEDP 25.  12/15/23 RHC: RA 10, RV 36/11, PA 33/24/27, PCW 29 (v35), PA sat 64.6%, Christophe CO/CI 3.82/2.07.    12/13/23 TTE: LVIDd 6.7cm, LVEF <10%, grade III DD, severe RVE with severe RV dysfunction (TAPSE 1.0cm), severe RODNEY, mild MR, severe TR, mod-severe NV, est PASP 46 mmHg. Initial HF Consult: Dr. Hall    54 y/o Mohawk speaking male originally from [facility] with history of Etoh use (currently endorses drinking 2 shots of tequila/day which is much less than previously) presented to ED 12/12 with c/o dyspnea for ~1week.   Tested positive for RSV, +cough. CXR revealed clear lungs and cardiomegaly. EKG with bifascicular block, possible old inferolateral infarct and frequent ventricular ectopy. Troponin negative x 3.     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   ProBNP 7690. Treated with IV Lasix 40mg BID. TSH wdl, HIV/Hep neg. 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  He is s/p R/LHC 12/15/23 which showed normal coronary arteries, elevated left sided filling pressures, and CI 2.1. Will continue to optimize his GDMT and follow-up closely with him as an outpatient.    Cardiac Studies:  12/15/23 LHC: Normal coronary arteries. Ao 101/82 (87), RA 10, RV 36/11, PAP 33/24/27, PCWO 29, CO/CI 3.8/2.1, LVEDP 25.  12/15/23 RHC: RA 10, RV 36/11, PA 33/24/27, PCW 29 (v35), PA sat 64.6%, Christophe CO/CI 3.82/2.07.    12/13/23 TTE: LVIDd 6.7cm, LVEF <10%, grade III DD, severe RVE with severe RV dysfunction (TAPSE 1.0cm), severe RODNEY, mild MR, severe TR, mod-severe [place], est PASP 46 mmHg.

## 2023-12-20 NOTE — DIETITIAN INITIAL EVALUATION ADULT - PERTINENT MEDS FT
MEDICATIONS  (STANDING):  losartan 12.5 milliGRAM(s) Oral <User Schedule>  metoprolol succinate ER 25 milliGRAM(s) Oral at bedtime    MEDICATIONS  (PRN):  ondansetron Injectable 4 milliGRAM(s) IV Push every 8 hours PRN Nausea and/or Vomiting

## 2023-12-20 NOTE — DIETITIAN INITIAL EVALUATION ADULT - CALCULATED TO (CAL/KG)
Order for discharge received.Discharge instructions explained to pt and voiced a understanding.Calm,cooperative,no si/hi,or psychosis.Pt will be following up with [facility],[address] [place] [address] [place],Elham Martin.[address],telephone [phone].Pt instructed to call here on Monday for follow up date and time.Pt does not use tobacco products.Pt UDS was positive for benzodiazepines but pt is prescribed benzo.AVS was faxed today 11-18-18 at 1010am.Pt will provide ride home.   [phone]

## 2023-12-20 NOTE — DIETITIAN INITIAL EVALUATION ADULT - OTHER INFO
53/M, alcohol use disorder (2-3 rum shots)/ night, last drink 12/10, no other significant pmhx presented to the ED for complaints of dyspnea on exertion which began approximately 1 week ago. RVP- RSV +. Also found to have acute systolic heart failure, pending cardiac cath. Chronic systolic heart failure 2/2 possible chronic chagas disease.

## 2023-12-20 NOTE — PROGRESS NOTE ADULT - SUBJECTIVE AND OBJECTIVE BOX
[facility] Advanced Heart Failure - Progress Note  [address] [place] [address]  Office Phone: [phone]/Fax: [phone]  Service/On Call Phone [phone]    Subjective/Objective: Pt. interviewed/examined with assistance of  (ID# [id number]). NAEO. Pt. currently denies any overt HF symptoms including dizziness.     Medications:  acetaminophen     Tablet .. 650 milliGRAM(s) Oral every 6 hours PRN  albuterol    90 MICROgram(s) HFA Inhaler 2 Puff(s) Inhalation every 6 hours  aluminum hydroxide/magnesium hydroxide/simethicone Suspension 30 milliLiter(s) Oral every 4 hours PRN  enoxaparin Injectable 40 milliGRAM(s) SubCutaneous every 12 hours  guaifenesin/dextromethorphan Oral Liquid 20 milliLiter(s) Oral four times a day PRN  losartan 12.5 milliGRAM(s) Oral <User Schedule>  melatonin 3 milliGRAM(s) Oral at bedtime PRN  metoprolol succinate ER 25 milliGRAM(s) Oral at bedtime  ondansetron Injectable 4 milliGRAM(s) IV Push every 8 hours PRN    Vital Signs Last 24 Hours  T(C): 36.8 (12-20-23 @ 05:55), Max: 36.8 (12-19-23 @ 16:57)  HR: 83 (12-20-23 @ 12:06) (77 - 87)  BP: 93/73 (12-20-23 @ 12:06) (80/45 - 93/73)  RR: 18 (12-20-23 @ 08:30) (17 - 20)  SpO2: 95% (12-20-23 @ 08:30) (92% - 95%)    I&O's Summary  19 Dec 2023 07:01  -  20 Dec 2023 07:00  --------------------------------------------------------  IN: 120 mL / OUT: 950 mL / NET: -830 mL    Tele: SR, BBB, multifocal PVCs, couplet    Physical Exam:  General: Sitting up in chair, in no distress.   HEENT: EOMs intact.  Neck: Neck supple. JVP not elevated.   Chest: Clear to auscultation bilaterally.  CV: RRR. Normal S1 and S2. No murmurs, rub, or gallops. Lukewarm peripherally.  PV: No LE edema noted. Pulses full/equal in all four extremities.  Abdomen: Soft, non-distended, non-tender.  Skin: warm, dry, no rash.  Neurology: Alert and oriented times three. Sensation intact.  Psych: Appropriate affect.    Labs:                        17.9   4.94  )-----------( 229      ( 19 Dec 2023 04:45 )             50.7     12-19    137  |  97  |  21.6<H>  ----------------------------<  149<H>  4.5   |  29.0  |  1.16    Ca    9.1      19 Dec 2023 14:34  Phos  4.2     12-19  Mg     2.0     12-19    TPro  7.0  /  Alb  3.4  /  TBili  0.6  /  DBili  x   /  AST  50<H>  /  ALT  38  /  AlkPhos  113  12-19                       [facility] Advanced Heart Failure - Progress Note  [address] [place] [address]  Office Phone: [phone]/Fax: [phone]  Service/On Call Phone [phone]    Subjective/Objective: Pt. interviewed/examined with assistance of  (ID# [id number]). NAEO. Pt. currently denies any overt HF symptoms including dizziness.     Medications:  acetaminophen     Tablet .. 650 milliGRAM(s) Oral every 6 hours PRN  albuterol    90 MICROgram(s) HFA Inhaler 2 Puff(s) Inhalation every 6 hours  aluminum hydroxide/magnesium hydroxide/simethicone Suspension 30 milliLiter(s) Oral every 4 hours PRN  enoxaparin Injectable 40 milliGRAM(s) SubCutaneous every 12 hours  guaifenesin/dextromethorphan Oral Liquid 20 milliLiter(s) Oral four times a day PRN  losartan 12.5 milliGRAM(s) Oral <User Schedule>  melatonin 3 milliGRAM(s) Oral at bedtime PRN  metoprolol succinate ER 25 milliGRAM(s) Oral at bedtime  ondansetron Injectable 4 milliGRAM(s) IV Push every 8 hours PRN    Vital Signs Last 24 Hours  T(C): 36.8 (12-20-23 @ 05:55), Max: 36.8 (12-19-23 @ 16:57)  HR: 83 (12-20-23 @ 12:06) (77 - 87)  BP: 93/73 (12-20-23 @ 12:06) (80/45 - 93/73)  RR: 18 (12-20-23 @ 08:30) (17 - 20)  SpO2: 95% (12-20-23 @ 08:30) (92% - 95%)    I&O's Summary  19 Dec 2023 07:01  -  20 Dec 2023 07:00  --------------------------------------------------------  IN: 120 mL / OUT: 950 mL / NET: -830 mL    Tele: SR, BBB, multifocal PVCs, couplet    Physical Exam:  General: Sitting up in chair, in no distress.   HEENT: EOMs intact.  Neck: Neck supple. JVP not elevated.   Chest: Clear to auscultation bilaterally.  CV: RRR. Normal S1 and S2. No murmurs, rub, or gallops. Lukewarm peripherally.  PV: No LE edema noted. Pulses full/equal in all four extremities.  Abdomen: Soft, non-distended, non-tender.  Skin: warm, dry, no rash.  Neurology: Alert and oriented times three. Sensation intact.  Psych: Appropriate affect.    Labs:                        17.9   4.94  )-----------( 229      ( 19 Dec 2023 04:45 )             50.7     12-19    137  |  97  |  21.6<H>  ----------------------------<  149<H>  4.5   |  29.0  |  1.16    Ca    9.1      19 Dec 2023 14:34  Phos  4.2     12-19  Mg     2.0     12-19    TPro  7.0  /  Alb  3.4  /  TBili  0.6  /  DBili  x   /  AST  50<H>  /  ALT  38  /  AlkPhos  113  12-19

## 2023-12-20 NOTE — PROGRESS NOTE ADULT - NS ATTEND AMEND GEN_ALL_CORE FT
Patient seen and examined at bedside. The patient's GDMT was held due to hypotension. He was completely asymptmmatic throughout all these epsiodes of hypotension  Recommend spreading out GDMT throughout the day. If it needs to be held, for a low BP reading of less than 90, please recheck 2 hours later and then give the  medications. His GDMT should be given and only be held for true symptomatic  low blood pressure readings.   Give BB at night and Losartan 12.5mg daily at noon

## 2023-12-20 NOTE — DIETITIAN INITIAL EVALUATION ADULT - PERTINENT LABORATORY DATA
12-19    137  |  97  |  21.6<H>  ----------------------------<  149<H>  4.5   |  29.0  |  1.16    Ca    9.1      19 Dec 2023 14:34  Phos  4.2     12-19  Mg     2.0     12-19    TPro  7.0  /  Alb  3.4  /  TBili  0.6  /  DBili  x   /  AST  50<H>  /  ALT  38  /  AlkPhos  113  12-19  A1C with Estimated Average Glucose Result: 6.4 % (12-13-23 @ 07:18)

## 2023-12-20 NOTE — DIETITIAN INITIAL EVALUATION ADULT - NS FNS DIET ORDER
Diet, DASH/TLC:   Sodium & Cholesterol Restricted  1500mL Fluid Restriction ([id number]) (12-17-23 @ 08:13)   Diet, DASH/TLC:   Sodium & Cholesterol Restricted  1500mL Fluid Restriction ([id number]) (12-17-23 @ 08:13)

## 2023-12-20 NOTE — PROGRESS NOTE ADULT - SUBJECTIVE AND OBJECTIVE BOX
[facility] Division of Hospital Medicine    Chief Complaint:      SUBJECTIVE / OVERNIGHT EVENTS:    Patient denies any complaints. PT informed me that he was more unsteady on his feet today.     MEDICATIONS  (STANDING):  albuterol    90 MICROgram(s) HFA Inhaler 2 Puff(s) Inhalation every 6 hours  enoxaparin Injectable 40 milliGRAM(s) SubCutaneous every 12 hours  losartan 12.5 milliGRAM(s) Oral <User Schedule>  metoprolol succinate ER 25 milliGRAM(s) Oral at bedtime    MEDICATIONS  (PRN):  acetaminophen     Tablet .. 650 milliGRAM(s) Oral every 6 hours PRN Temp greater or equal to 38C (100.4F), Mild Pain (1 - 3)  aluminum hydroxide/magnesium hydroxide/simethicone Suspension 30 milliLiter(s) Oral every 4 hours PRN Dyspepsia  guaifenesin/dextromethorphan Oral Liquid 20 milliLiter(s) Oral four times a day PRN Cough  melatonin 3 milliGRAM(s) Oral at bedtime PRN Insomnia  ondansetron Injectable 4 milliGRAM(s) IV Push every 8 hours PRN Nausea and/or Vomiting        I&O's Summary    19 Dec 2023 07:01  -  20 Dec 2023 07:00  --------------------------------------------------------  IN: 120 mL / OUT: 950 mL / NET: -830 mL        PHYSICAL EXAM:  Vital Signs Last 24 Hrs  T(C): 36.8 (20 Dec 2023 05:55), Max: 36.8 (19 Dec 2023 16:57)  T(F): 98.2 (20 Dec 2023 05:55), Max: 98.3 (19 Dec 2023 16:57)  HR: 83 (20 Dec 2023 12:06) (77 - 87)  BP: 93/73 (20 Dec 2023 12:06) (80/45 - 93/73)  BP(mean): 75 (19 Dec 2023 16:57) (75 - 75)  RR: 18 (20 Dec 2023 08:30) (17 - 20)  SpO2: 95% (20 Dec 2023 08:30) (92% - 95%)    Parameters below as of 20 Dec 2023 08:30  Patient On (Oxygen Delivery Method): room air        CONSTITUTIONAL: NAD  ENMT: normocephalic, atraumatic  RESPIRATORY: Normal respiratory effort; lungs are clear to auscultation bilaterally  CARDIOVASCULAR: Regular rate and rhythm, normal S1 and S2, no murmur/rub/gallop.  MUSCLOSKELETAL:  No edema  PSYCH: A+O to person, place, and time; affect appropriate  NEUROLOGY: CN 2-12 are intact and symmetric; no gross deficits;   SKIN: No rashes; no palpable lesions    LABS:                        17.9   4.94  )-----------( 229      ( 19 Dec 2023 04:45 )             50.7     12-19    137  |  97  |  21.6<H>  ----------------------------<  149<H>  4.5   |  29.0  |  1.16    Ca    9.1      19 Dec 2023 14:34  Phos  4.2     12-19  Mg     2.0     12-19    TPro  7.0  /  Alb  3.4  /  TBili  0.6  /  DBili  x   /  AST  50<H>  /  ALT  38  /  AlkPhos  113  12-19          Urinalysis Basic - ( 19 Dec 2023 14:34 )    Color: x / Appearance: x / SG: x / pH: x  Gluc: 149 mg/dL / Ketone: x  / Bili: x / Urobili: x   Blood: x / Protein: x / Nitrite: x   Leuk Esterase: x / RBC: x / WBC x   Sq Epi: x / Non Sq Epi: x / Bacteria: x        CAPILLARY BLOOD GLUCOSE            RADIOLOGY & ADDITIONAL TESTS:  Results Reviewed:   Imaging Personally Reviewed:  Electrocardiogram Personally Reviewed:                                           [facility] Division of Hospital Medicine    Chief Complaint:      SUBJECTIVE / OVERNIGHT EVENTS:    Patient denies any complaints. PT informed me that he was more unsteady on his feet today.     MEDICATIONS  (STANDING):  albuterol    90 MICROgram(s) HFA Inhaler 2 Puff(s) Inhalation every 6 hours  enoxaparin Injectable 40 milliGRAM(s) SubCutaneous every 12 hours  losartan 12.5 milliGRAM(s) Oral <User Schedule>  metoprolol succinate ER 25 milliGRAM(s) Oral at bedtime    MEDICATIONS  (PRN):  acetaminophen     Tablet .. 650 milliGRAM(s) Oral every 6 hours PRN Temp greater or equal to 38C (100.4F), Mild Pain (1 - 3)  aluminum hydroxide/magnesium hydroxide/simethicone Suspension 30 milliLiter(s) Oral every 4 hours PRN Dyspepsia  guaifenesin/dextromethorphan Oral Liquid 20 milliLiter(s) Oral four times a day PRN Cough  melatonin 3 milliGRAM(s) Oral at bedtime PRN Insomnia  ondansetron Injectable 4 milliGRAM(s) IV Push every 8 hours PRN Nausea and/or Vomiting        I&O's Summary    19 Dec 2023 07:01  -  20 Dec 2023 07:00  --------------------------------------------------------  IN: 120 mL / OUT: 950 mL / NET: -830 mL        PHYSICAL EXAM:  Vital Signs Last 24 Hrs  T(C): 36.8 (20 Dec 2023 05:55), Max: 36.8 (19 Dec 2023 16:57)  T(F): 98.2 (20 Dec 2023 05:55), Max: 98.3 (19 Dec 2023 16:57)  HR: 83 (20 Dec 2023 12:06) (77 - 87)  BP: 93/73 (20 Dec 2023 12:06) (80/45 - 93/73)  BP(mean): 75 (19 Dec 2023 16:57) (75 - 75)  RR: 18 (20 Dec 2023 08:30) (17 - 20)  SpO2: 95% (20 Dec 2023 08:30) (92% - 95%)    Parameters below as of 20 Dec 2023 08:30  Patient On (Oxygen Delivery Method): room air        CONSTITUTIONAL: NAD  ENMT: normocephalic, atraumatic  RESPIRATORY: Normal respiratory effort; lungs are clear to auscultation bilaterally  CARDIOVASCULAR: Regular rate and rhythm, normal S1 and S2, no murmur/rub/gallop.  MUSCLOSKELETAL:  No edema  PSYCH: A+O to person, place, and time; affect appropriate  NEUROLOGY: CN 2-12 are intact and symmetric; no gross deficits;   SKIN: No rashes; no palpable lesions    LABS:                        17.9   4.94  )-----------( 229      ( 19 Dec 2023 04:45 )             50.7     12-19    137  |  97  |  21.6<H>  ----------------------------<  149<H>  4.5   |  29.0  |  1.16    Ca    9.1      19 Dec 2023 14:34  Phos  4.2     12-19  Mg     2.0     12-19    TPro  7.0  /  Alb  3.4  /  TBili  0.6  /  DBili  x   /  AST  50<H>  /  ALT  38  /  AlkPhos  113  12-19          Urinalysis Basic - ( 19 Dec 2023 14:34 )    Color: x / Appearance: x / SG: x / pH: x  Gluc: 149 mg/dL / Ketone: x  / Bili: x / Urobili: x   Blood: x / Protein: x / Nitrite: x   Leuk Esterase: x / RBC: x / WBC x   Sq Epi: x / Non Sq Epi: x / Bacteria: x        CAPILLARY BLOOD GLUCOSE            RADIOLOGY & ADDITIONAL TESTS:  Results Reviewed:   Imaging Personally Reviewed:  Electrocardiogram Personally Reviewed:

## 2023-12-20 NOTE — PROGRESS NOTE ADULT - ASSESSMENT
53/M, alcohol use disorder (2-3 rum shots)/ night, last drink 12/10, no other significant pmhx presented to the ED for complaints of dyspnea on exertion which began approximately 1 week ago. RVP- RSV +. Also found to have acute systolic heart failure, pending cardiac cath.     Chronic systolic heart failure 2/2 possible chronic chagas disease  This is new diagnosis for him, acute element has resolved. TTE with EF <10%. LHC showed normal coronaries. right heart cath with elevated pressures.  Low BP limiting GDMT lasix, aldactone and losartan were held this morning. d/w Dr. Hall cardiologist and will reintroduce meds one at a time and will spread them apart during the day. restart losartan 12.5 mg and schedule in for 2pm. already on metoprolol XL at bedtime. trypanosoma cruzi IgG Ab positive. no specific treatment indicated at this point.    Debility. PT informed me that he is more unsteady today then yesterday.    Dizziness. resolved. patient has been c/o dizziness regardless of position. this was not associated with orthostatic hypotension.     RSV related URI. resolved.       Alcohol withdrawal  s/p CIWA and librium taper.    RUQ pain  resolved  - CMP WNL   - RUQ without acute findings    VTE prophylaxis- Lovenox    Dispo: Home pending cardiac meds optimization

## 2023-12-20 NOTE — PROGRESS NOTE ADULT - PROBLEM SELECTOR PLAN 1
ACC/AHA Stage C, NYHA class II-III symptoms  Newly diagnosed NICM, dilated cardiomyopathy (LVEF <10%, LVIDd 6.7cm, severe RV dysfunction)  [facility] with normal coronaries. Possible etiologies include Etoh abuse, +Trypanosoma AB (chronic).  Clinically euvolemic w/ warm extremities.  Preserved end organ function.  GDMT: Limited by hypotension (orthostatic BP negative) and cost due to uninsured status. Please stagger medications throughout the day.    -ACEi/ARB/ARNI -  Losartan 12.5 mg daily (hold for SBP <90).    -BB - Toprol-XL 25 mg nightly    -MRA - can attempt to resume as outpt    -SGLT2i - deferred due to cost.    Diuretics: standing lasix not currently indicated.   Low Na+, FR diet recommended.  Follow-up visit has been arranged at the [facility] in [place] on 1/2 with Dr. Odom however given significant cardiomyopathy he should also f/u in our HF clinic. He will need to apply for sliding scale (contact # [phone]. ACC/AHA Stage C, NYHA class II-III symptoms  Newly diagnosed NICM, dilated cardiomyopathy (LVEF <10%, LVIDd 6.7cm, severe RV dysfunction)  [facility] with normal coronaries. Possible etiologies include Etoh abuse, +Trypanosoma AB (chronic).  Clinically euvolemic w/ warm extremities.  Preserved end organ function.  GDMT: Limited by hypotension (orthostatic BP negative) and cost due to uninsured status. Please stagger medications throughout the day.    -ACEi/ARB/ARNI -  Losartan 12.5 mg daily (hold for SBP <90).    -BB - Toprol-XL 25 mg nightly    -MRA - can attempt to resume as outpt    -SGLT2i - deferred due to cost.    Diuretics: standing lasix not currently indicated.   Low Na+, FR diet recommended.  Follow-up visit has been arranged at the [facility] in [place] on 1/2 with Dr. Odom however given significant cardiomyopathy he should also f/u in our HF clinic. He will need to apply for sliding scale (contact # [phone]. ACC/AHA Stage C, NYHA class II-III symptoms  Newly diagnosed NICM, dilated cardiomyopathy (LVEF <10%, LVIDd 6.7cm, severe RV dysfunction)  [facility] with normal coronaries. Possible etiologies include Etoh abuse, +Trypanosoma AB (chronic).  Clinically euvolemic w/ warm extremities.  Preserved end organ function.  GDMT: Limited by hypotension (orthostatic BP negative) and cost due to uninsured status. Please stagger medications throughout the day.    -ACEi/ARB/ARNI -  Losartan 12.5 mg daily (hold for SBP <90).    -BB - Toprol-XL 25 mg nightly    -MRA - can attempt to resume as outpt    -SGLT2i - deferred due to cost.    Diuretics: standing lasix not currently indicated.   Low Na+, FR diet recommended.  Follow-up visit has been arranged at the [facility] in [place] on 1/3 with Dr. Odom however given significant cardiomyopathy he should also f/u in our HF clinic. He will need to apply for sliding scale (contact # [phone]. ACC/AHA Stage C, NYHA class II-III symptoms  Newly diagnosed NICM, dilated cardiomyopathy (LVEF <10%, LVIDd 6.7cm, severe RV dysfunction)  [facility] with normal coronaries. Possible etiologies include Etoh abuse, +Trypanosoma AB (chronic).  Clinically euvolemic w/ warm extremities.  Preserved end organ function.  GDMT: Limited by hypotension (orthostatic BP negative) and cost due to uninsured status. Please stagger medications throughout the day.    -ACEi/ARB/ARNI -  Losartan 12.5 mg daily (hold for SBP <90).    -BB - Toprol-XL 25 mg nightly    -MRA - can attempt to resume as outpt    -SGLT2i - deferred due to cost.    Diuretics: standing lasix not currently indicated.   Low Na+, FR diet recommended.  Follow-up visit has been arranged at the [facility] in [place] on 1/3 with Dr. Odom however given significant cardiomyopathy he should also f/u in our HF clinic. He will need to apply for sliding scale (contact # [phone].

## 2023-12-21 PROCEDURE — 99232 SBSQ HOSP IP/OBS MODERATE 35: CPT

## 2023-12-21 RX ORDER — LOSARTAN POTASSIUM 100 MG/1
12.5 TABLET, FILM COATED ORAL
Refills: 0 | Status: DISCONTINUED | OUTPATIENT
Start: 2023-12-21 | End: 2023-12-22

## 2023-12-21 RX ORDER — METOPROLOL TARTRATE 50 MG
25 TABLET ORAL AT BEDTIME
Refills: 0 | Status: DISCONTINUED | OUTPATIENT
Start: 2023-12-21 | End: 2023-12-22

## 2023-12-21 RX ADMIN — ENOXAPARIN SODIUM 40 MILLIGRAM(S): 100 INJECTION SUBCUTANEOUS at 09:56

## 2023-12-21 RX ADMIN — LOSARTAN POTASSIUM 12.5 MILLIGRAM(S): 100 TABLET, FILM COATED ORAL at 13:18

## 2023-12-21 RX ADMIN — ENOXAPARIN SODIUM 40 MILLIGRAM(S): 100 INJECTION SUBCUTANEOUS at 21:04

## 2023-12-21 NOTE — PROGRESS NOTE ADULT - NS ATTEND AMEND GEN_ALL_CORE FT
Patient seen and examined at bedside. The patient's GDMT was held due to low BP however he remains asymptomatic   Recommend spreading out GDMT throughout the day. If it needs to be held, for a low BP reading of less than 80, please recheck 2 hours later and then give the  medications. His GDMT should be given and only be held for true symptomatic  low blood pressure readings.   Give BB at night and Losartan 12.5mg daily at noon

## 2023-12-21 NOTE — PROGRESS NOTE ADULT - ASSESSMENT
53/M, alcohol use disorder (2-3 rum shots)/ night, last drink 12/10, no other significant pmhx presented to the ED for complaints of dyspnea on exertion which began approximately 1 week ago. RVP- RSV +. Also found to have acute systolic heart failure, pending cardiac cath.     Chronic systolic heart failure 2/2 possible chronic chagas disease  This is new diagnosis for him, acute element has resolved. TTE with EF <10%. LHC showed normal coronaries. right heart cath with elevated pressures.  Low BP limiting GDMT. will restart losartan 12.5 mg and metoprolol XL at bedtime with holding parameters of SBP<80. trypanosoma cruzi IgG Ab positive. no specific treatment indicated at this point.    Debility. PT following and on last evaluation he was very unsteady    Dizziness. resolved. patient has been c/o dizziness regardless of position. this was not associated with orthostatic hypotension.     RSV related URI. resolved.       Alcohol withdrawal  s/p CIWA and librium taper.    RUQ pain  resolved  - CMP WNL   - RUQ without acute findings    VTE prophylaxis- Lovenox    Dispo: Home pending cardiac meds optimization

## 2023-12-21 NOTE — PROGRESS NOTE ADULT - SUBJECTIVE AND OBJECTIVE BOX
ADVANCED HEART FAILURE - PROGRESS NOTE  [address] [place] [address]  Office Phone: [phone]/Fax: [phone]  Service/On Call Phone [phone]  _______________________________________________________________________________________________________    Subjective:  - NAEO  - OOB to chair with no complaints    Medications:  acetaminophen     Tablet .. 650 milliGRAM(s) Oral every 6 hours PRN  albuterol    90 MICROgram(s) HFA Inhaler 2 Puff(s) Inhalation every 6 hours  aluminum hydroxide/magnesium hydroxide/simethicone Suspension 30 milliLiter(s) Oral every 4 hours PRN  enoxaparin Injectable 40 milliGRAM(s) SubCutaneous every 12 hours  guaifenesin/dextromethorphan Oral Liquid 20 milliLiter(s) Oral four times a day PRN  losartan 12.5 milliGRAM(s) Oral <User Schedule>  melatonin 3 milliGRAM(s) Oral at bedtime PRN  metoprolol succinate ER 25 milliGRAM(s) Oral at bedtime  ondansetron Injectable 4 milliGRAM(s) IV Push every 8 hours PRN      ICU Vital Signs Last 24 Hrs  T(C): 36.7, Max: 36.7 (- @ 07:50)  HR: 76 (76 - 86)  BP: 92/66 (87/48 - 94/57)  BP(mean): --  ABP: --  ABP(mean): --  RR: 18 (18 - 18)  SpO2: 94% (93% - 96%)    Weight in k.4 (12--23)  Weight in k.3 (23)      I&O's Summary Last 24 Hrs    IN: 240 mL / OUT: 800 mL / NET: -560 mL      Tele: SR 80s, PVCs    Physical Exam:    General: No distress. Comfortable.  Neck: JVP not elevated.  Respiratory: Clear to auscultation bilaterally  CV: RRR. Normal S1 and S2. No murmurs, rub, or gallops. Radial pulses normal.  Abdomen: Soft, non-distended, non-tender  Extremities: Warm, no edema  Neurology: Non-focal, alert and oriented times three.   Psych: Affect normal    Labs:      ( 23 @ 14:34 )     137  |  97  |  21.6  ---------------------<  149  4.5  |  29.0  |  1.16    Ca 9.1  Phos x   Mg x     ( 23 @ 14:34 )  TPro  7.0  /  Alb  3.4  /  TBili  0.6  /  DBili  x   /  AST  50  /  ALT  38  /  AlkPhos  113    ( 23 @ 07:18 )  TropHS 37    / CK x     / CKMB x      ( 23 @ 19:22 )  TropHS 37    / CK x     / CKMB x       ADVANCED HEART FAILURE - PROGRESS NOTE  [address] [place] [address]  Office Phone: [phone]/Fax: [phone]  Service/On Call Phone [phone]  _______________________________________________________________________________________________________    Subjective:  - NAEO  - OOB to chair with no complaints    Medications:  acetaminophen     Tablet .. 650 milliGRAM(s) Oral every 6 hours PRN  albuterol    90 MICROgram(s) HFA Inhaler 2 Puff(s) Inhalation every 6 hours  aluminum hydroxide/magnesium hydroxide/simethicone Suspension 30 milliLiter(s) Oral every 4 hours PRN  enoxaparin Injectable 40 milliGRAM(s) SubCutaneous every 12 hours  guaifenesin/dextromethorphan Oral Liquid 20 milliLiter(s) Oral four times a day PRN  losartan 12.5 milliGRAM(s) Oral <User Schedule>  melatonin 3 milliGRAM(s) Oral at bedtime PRN  metoprolol succinate ER 25 milliGRAM(s) Oral at bedtime  ondansetron Injectable 4 milliGRAM(s) IV Push every 8 hours PRN      ICU Vital Signs Last 24 Hrs  T(C): 36.7, Max: 36.7 (- @ 07:50)  HR: 76 (76 - 86)  BP: 92/66 (87/48 - 94/57)  BP(mean): --  ABP: --  ABP(mean): --  RR: 18 (18 - 18)  SpO2: 94% (93% - 96%)    Weight in k.4 (12--23)  Weight in k.3 (23)      I&O's Summary Last 24 Hrs    IN: 240 mL / OUT: 800 mL / NET: -560 mL      Tele: SR 80s, PVCs    Physical Exam:    General: No distress. Comfortable.  Neck: JVP not elevated.  Respiratory: Clear to auscultation bilaterally  CV: RRR. Normal S1 and S2. No murmurs, rub, or gallops. Radial pulses normal.  Abdomen: Soft, non-distended, non-tender  Extremities: Warm, no edema  Neurology: Non-focal, alert and oriented times three.   Psych: Affect normal    Labs:      ( 23 @ 14:34 )     137  |  97  |  21.6  ---------------------<  149  4.5  |  29.0  |  1.16    Ca 9.1  Phos x   Mg x     ( 23 @ 14:34 )  TPro  7.0  /  Alb  3.4  /  TBili  0.6  /  DBili  x   /  AST  50  /  ALT  38  /  AlkPhos  113    ( 23 @ 07:18 )  TropHS 37    / CK x     / CKMB x      ( 23 @ 19:22 )  TropHS 37    / CK x     / CKMB x

## 2023-12-21 NOTE — PROGRESS NOTE ADULT - ASSESSMENT
Initial HF Consult: Dr. Hall    54 y/o Lao speaking male originally from [facility] with history of Etoh use (currently endorses drinking 2 shots of tequila/day which is much less than previously) presented to ED 12/12 with c/o dyspnea for ~1week.   Tested positive for RSV, +cough. CXR revealed clear lungs and cardiomegaly. EKG with bifascicular block, possible old inferolateral infarct and frequent ventricular ectopy. Troponin negative x 3.     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   ProBNP 7690. Treated with IV Lasix 40mg BID. TSH wdl, HIV/Hep neg. 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  He is s/p R/LHC 12/15/23 which showed normal coronary arteries, elevated left sided filling pressures, and CI 2.1. Will continue to optimize his GDMT and follow-up closely with him as an outpatient.    Cardiac Studies:  12/15/23 LHC: Normal coronary arteries. Ao 101/82 (87), RA 10, RV 36/11, PAP 33/24/27, PCWO 29, CO/CI 3.8/2.1, LVEDP 25.  12/15/23 RHC: RA 10, RV 36/11, PA 33/24/27, PCW 29 (v35), PA sat 64.6%, Christophe CO/CI 3.82/2.07.    12/13/23 TTE: LVIDd 6.7cm, LVEF <10%, grade III DD, severe RVE with severe RV dysfunction (TAPSE 1.0cm), severe RODNEY, mild MR, severe TR, mod-severe WV, est PASP 46 mmHg. Initial HF Consult: Dr. Hall    52 y/o Urdu speaking male originally from [facility] with history of Etoh use (currently endorses drinking 2 shots of tequila/day which is much less than previously) presented to ED 12/12 with c/o dyspnea for ~1week.   Tested positive for RSV, +cough. CXR revealed clear lungs and cardiomegaly. EKG with bifascicular block, possible old inferolateral infarct and frequent ventricular ectopy. Troponin negative x 3.     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   ProBNP 7690. Treated with IV Lasix 40mg BID. TSH wdl, HIV/Hep neg. 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  He is s/p R/LHC 12/15/23 which showed normal coronary arteries, elevated left sided filling pressures, and CI 2.1. Will continue to optimize his GDMT and follow-up closely with him as an outpatient.    Cardiac Studies:  12/15/23 LHC: Normal coronary arteries. Ao 101/82 (87), RA 10, RV 36/11, PAP 33/24/27, PCWO 29, CO/CI 3.8/2.1, LVEDP 25.  12/15/23 RHC: RA 10, RV 36/11, PA 33/24/27, PCW 29 (v35), PA sat 64.6%, Christophe CO/CI 3.82/2.07.    12/13/23 TTE: LVIDd 6.7cm, LVEF <10%, grade III DD, severe RVE with severe RV dysfunction (TAPSE 1.0cm), severe RODNEY, mild MR, severe TR, mod-severe NE, est PASP 46 mmHg.

## 2023-12-21 NOTE — PROGRESS NOTE ADULT - PROBLEM SELECTOR PLAN 1
ACC/AHA Stage C, NYHA class II-III symptoms  Newly diagnosed NICM, dilated cardiomyopathy (LVEF <10%, LVIDd 6.7cm, severe RV dysfunction)  [facility] with normal coronaries. Possible etiologies include Etoh abuse, +Trypanosoma AB (chronic).  Clinically euvolemic w/ warm extremities.  Preserved end organ function.  GDMT: Limited by hypotension (orthostatic BP negative) and cost due to uninsured status. Please stagger medications throughout the day.    -ACEi/ARB/ARNI -  Losartan 12.5 mg daily (hold for SBP <80).    -BB - Toprol-XL 25 mg nightly    -MRA - can attempt to resume as outpt    -SGLT2i - deferred due to cost.    Diuretics: standing lasix not currently indicated.   Low Na+, FR diet recommended.  Follow-up visit has been arranged at the [facility] in [place] on 1/3 with Dr. Odom however given significant cardiomyopathy he should also f/u in our HF clinic. He will need to apply for sliding scale; contact # [phone]. ACC/AHA Stage C, NYHA class II-III symptoms  Newly diagnosed NICM, dilated cardiomyopathy (LVEF <10%, LVIDd 6.7cm, severe RV dysfunction)  [facility] with normal coronaries. Possible etiologies include Etoh abuse, +Trypanosoma AB (chronic).  Clinically euvolemic w/ warm extremities.  Preserved end organ function.  GDMT: Limited by hypotension (orthostatic BP negative) and cost due to uninsured status. Please stagger medications throughout the day.    -ACEi/ARB/ARNI -  Losartan 12.5 mg daily (hold for SBP <80).    -BB - Toprol-XL 25 mg nightly    -MRA - can attempt to resume as outpt    -SGLT2i - deferred due to cost.    Diuretics: standing lasix not currently indicated.   Low Na+, FR diet recommended.  Follow-up visit has been arranged at the [facility] in [place] on 1/3 with Dr. Odom however given significant cardiomyopathy he should also f/u in our HF clinic. He will need to apply for sliding scale; contact # [phone].

## 2023-12-21 NOTE — PROGRESS NOTE ADULT - SUBJECTIVE AND OBJECTIVE BOX
[facility] Division of Hospital Medicine    Chief Complaint:      SUBJECTIVE / OVERNIGHT EVENTS:    Patient denies any complaints.    MEDICATIONS  (STANDING):  albuterol    90 MICROgram(s) HFA Inhaler 2 Puff(s) Inhalation every 6 hours  enoxaparin Injectable 40 milliGRAM(s) SubCutaneous every 12 hours  losartan 12.5 milliGRAM(s) Oral <User Schedule>  metoprolol succinate ER 25 milliGRAM(s) Oral at bedtime    MEDICATIONS  (PRN):  acetaminophen     Tablet .. 650 milliGRAM(s) Oral every 6 hours PRN Temp greater or equal to 38C (100.4F), Mild Pain (1 - 3)  aluminum hydroxide/magnesium hydroxide/simethicone Suspension 30 milliLiter(s) Oral every 4 hours PRN Dyspepsia  guaifenesin/dextromethorphan Oral Liquid 20 milliLiter(s) Oral four times a day PRN Cough  melatonin 3 milliGRAM(s) Oral at bedtime PRN Insomnia  ondansetron Injectable 4 milliGRAM(s) IV Push every 8 hours PRN Nausea and/or Vomiting        I&O's Summary    20 Dec 2023 07:01  -  21 Dec 2023 07:00  --------------------------------------------------------  IN: 240 mL / OUT: 800 mL / NET: -560 mL        PHYSICAL EXAM:  Vital Signs Last 24 Hrs  T(C): 36.7 (21 Dec 2023 07:50), Max: 36.7 (21 Dec 2023 07:50)  T(F): 98.1 (21 Dec 2023 07:50), Max: 98.1 (21 Dec 2023 07:50)  HR: 76 (21 Dec 2023 07:50) (76 - 86)  BP: 92/66 (21 Dec 2023 07:50) (87/48 - 94/57)  BP(mean): --  RR: 18 (21 Dec 2023 07:50) (18 - 18)  SpO2: 94% (21 Dec 2023 07:50) (93% - 96%)    Parameters below as of 21 Dec 2023 07:50  Patient On (Oxygen Delivery Method): room air          CONSTITUTIONAL: NAD  ENMT: normocephalic, atraumatic  RESPIRATORY: Normal respiratory effort; lungs are clear to auscultation bilaterally  CARDIOVASCULAR: Regular rate and rhythm, normal S1 and S2, no murmur/rub/gallop.  MUSCLOSKELETAL:  No edema  PSYCH: A+O to person, place, and time; affect appropriate  NEUROLOGY: CN 2-12 are intact and symmetric; no gross deficits;   SKIN: No rashes; no palpable lesions    LABS:                    CAPILLARY BLOOD GLUCOSE            RADIOLOGY & ADDITIONAL TESTS:  Results Reviewed:   Imaging Personally Reviewed:  Electrocardiogram Personally Reviewed:                                           [facility] Division of Hospital Medicine    Chief Complaint:      SUBJECTIVE / OVERNIGHT EVENTS:    Patient denies any complaints.    MEDICATIONS  (STANDING):  albuterol    90 MICROgram(s) HFA Inhaler 2 Puff(s) Inhalation every 6 hours  enoxaparin Injectable 40 milliGRAM(s) SubCutaneous every 12 hours  losartan 12.5 milliGRAM(s) Oral <User Schedule>  metoprolol succinate ER 25 milliGRAM(s) Oral at bedtime    MEDICATIONS  (PRN):  acetaminophen     Tablet .. 650 milliGRAM(s) Oral every 6 hours PRN Temp greater or equal to 38C (100.4F), Mild Pain (1 - 3)  aluminum hydroxide/magnesium hydroxide/simethicone Suspension 30 milliLiter(s) Oral every 4 hours PRN Dyspepsia  guaifenesin/dextromethorphan Oral Liquid 20 milliLiter(s) Oral four times a day PRN Cough  melatonin 3 milliGRAM(s) Oral at bedtime PRN Insomnia  ondansetron Injectable 4 milliGRAM(s) IV Push every 8 hours PRN Nausea and/or Vomiting        I&O's Summary    20 Dec 2023 07:01  -  21 Dec 2023 07:00  --------------------------------------------------------  IN: 240 mL / OUT: 800 mL / NET: -560 mL        PHYSICAL EXAM:  Vital Signs Last 24 Hrs  T(C): 36.7 (21 Dec 2023 07:50), Max: 36.7 (21 Dec 2023 07:50)  T(F): 98.1 (21 Dec 2023 07:50), Max: 98.1 (21 Dec 2023 07:50)  HR: 76 (21 Dec 2023 07:50) (76 - 86)  BP: 92/66 (21 Dec 2023 07:50) (87/48 - 94/57)  BP(mean): --  RR: 18 (21 Dec 2023 07:50) (18 - 18)  SpO2: 94% (21 Dec 2023 07:50) (93% - 96%)    Parameters below as of 21 Dec 2023 07:50  Patient On (Oxygen Delivery Method): room air          CONSTITUTIONAL: NAD  ENMT: normocephalic, atraumatic  RESPIRATORY: Normal respiratory effort; lungs are clear to auscultation bilaterally  CARDIOVASCULAR: Regular rate and rhythm, normal S1 and S2, no murmur/rub/gallop.  MUSCLOSKELETAL:  No edema  PSYCH: A+O to person, place, and time; affect appropriate  NEUROLOGY: CN 2-12 are intact and symmetric; no gross deficits;   SKIN: No rashes; no palpable lesions    LABS:                    CAPILLARY BLOOD GLUCOSE            RADIOLOGY & ADDITIONAL TESTS:  Results Reviewed:   Imaging Personally Reviewed:  Electrocardiogram Personally Reviewed:

## 2023-12-22 VITALS
DIASTOLIC BLOOD PRESSURE: 71 MMHG | HEART RATE: 82 BPM | OXYGEN SATURATION: 95 % | SYSTOLIC BLOOD PRESSURE: 103 MMHG | TEMPERATURE: 97 F | RESPIRATION RATE: 18 BRPM

## 2023-12-22 PROBLEM — F10.10 ALCOHOL ABUSE, UNCOMPLICATED: Chronic | Status: ACTIVE | Noted: 2023-12-12

## 2023-12-22 PROBLEM — Z00.00 ENCOUNTER FOR PREVENTIVE HEALTH EXAMINATION: Status: ACTIVE | Noted: 2023-12-22

## 2023-12-22 PROCEDURE — 96374 THER/PROPH/DIAG INJ IV PUSH: CPT

## 2023-12-22 PROCEDURE — 85610 PROTHROMBIN TIME: CPT

## 2023-12-22 PROCEDURE — 80053 COMPREHEN METABOLIC PANEL: CPT

## 2023-12-22 PROCEDURE — 36415 COLL VENOUS BLD VENIPUNCTURE: CPT

## 2023-12-22 PROCEDURE — C1887: CPT

## 2023-12-22 PROCEDURE — C1889: CPT

## 2023-12-22 PROCEDURE — 71045 X-RAY EXAM CHEST 1 VIEW: CPT

## 2023-12-22 PROCEDURE — 80048 BASIC METABOLIC PNL TOTAL CA: CPT

## 2023-12-22 PROCEDURE — 80061 LIPID PANEL: CPT

## 2023-12-22 PROCEDURE — C1894: CPT

## 2023-12-22 PROCEDURE — 93567 NJX CAR CTH SPRVLV AORTGRPHY: CPT

## 2023-12-22 PROCEDURE — 82728 ASSAY OF FERRITIN: CPT

## 2023-12-22 PROCEDURE — 94640 AIRWAY INHALATION TREATMENT: CPT

## 2023-12-22 PROCEDURE — 96375 TX/PRO/DX INJ NEW DRUG ADDON: CPT

## 2023-12-22 PROCEDURE — 93970 EXTREMITY STUDY: CPT

## 2023-12-22 PROCEDURE — 99285 EMERGENCY DEPT VISIT HI MDM: CPT

## 2023-12-22 PROCEDURE — 85027 COMPLETE CBC AUTOMATED: CPT

## 2023-12-22 PROCEDURE — C8924: CPT

## 2023-12-22 PROCEDURE — 85025 COMPLETE CBC W/AUTO DIFF WBC: CPT

## 2023-12-22 PROCEDURE — C1769: CPT

## 2023-12-22 PROCEDURE — 86706 HEP B SURFACE ANTIBODY: CPT

## 2023-12-22 PROCEDURE — 93005 ELECTROCARDIOGRAM TRACING: CPT

## 2023-12-22 PROCEDURE — 86753 PROTOZOA ANTIBODY NOS: CPT

## 2023-12-22 PROCEDURE — 0225U NFCT DS DNA&RNA 21 SARSCOV2: CPT

## 2023-12-22 PROCEDURE — 97116 GAIT TRAINING THERAPY: CPT

## 2023-12-22 PROCEDURE — 85730 THROMBOPLASTIN TIME PARTIAL: CPT

## 2023-12-22 PROCEDURE — 86703 HIV-1/HIV-2 1 RESULT ANTBDY: CPT

## 2023-12-22 PROCEDURE — 84145 PROCALCITONIN (PCT): CPT

## 2023-12-22 PROCEDURE — 82140 ASSAY OF AMMONIA: CPT

## 2023-12-22 PROCEDURE — 93308 TTE F-UP OR LMTD: CPT

## 2023-12-22 PROCEDURE — 85652 RBC SED RATE AUTOMATED: CPT

## 2023-12-22 PROCEDURE — 84484 ASSAY OF TROPONIN QUANT: CPT

## 2023-12-22 PROCEDURE — 86803 HEPATITIS C AB TEST: CPT

## 2023-12-22 PROCEDURE — 84443 ASSAY THYROID STIM HORMONE: CPT

## 2023-12-22 PROCEDURE — 93460 R&L HRT ART/VENTRICLE ANGIO: CPT

## 2023-12-22 PROCEDURE — 83880 ASSAY OF NATRIURETIC PEPTIDE: CPT

## 2023-12-22 PROCEDURE — 83036 HEMOGLOBIN GLYCOSYLATED A1C: CPT

## 2023-12-22 PROCEDURE — 93321 DOPPLER ECHO F-UP/LMTD STD: CPT

## 2023-12-22 PROCEDURE — 84100 ASSAY OF PHOSPHORUS: CPT

## 2023-12-22 PROCEDURE — 96376 TX/PRO/DX INJ SAME DRUG ADON: CPT

## 2023-12-22 PROCEDURE — 99232 SBSQ HOSP IP/OBS MODERATE 35: CPT

## 2023-12-22 PROCEDURE — 99239 HOSP IP/OBS DSCHRG MGMT >30: CPT

## 2023-12-22 PROCEDURE — 97163 PT EVAL HIGH COMPLEX 45 MIN: CPT

## 2023-12-22 PROCEDURE — 76705 ECHO EXAM OF ABDOMEN: CPT

## 2023-12-22 PROCEDURE — 83735 ASSAY OF MAGNESIUM: CPT

## 2023-12-22 PROCEDURE — 97530 THERAPEUTIC ACTIVITIES: CPT

## 2023-12-22 RX ORDER — METOPROLOL TARTRATE 50 MG
1 TABLET ORAL
Qty: 30 | Refills: 0
Start: 2023-12-22 | End: 2024-01-20

## 2023-12-22 RX ORDER — LOSARTAN POTASSIUM 100 MG/1
1 TABLET, FILM COATED ORAL
Qty: 30 | Refills: 0
Start: 2023-12-22 | End: 2024-02-02

## 2023-12-22 RX ORDER — METOPROLOL TARTRATE 50 MG
1 TABLET ORAL
Qty: 30 | Refills: 0
Start: 2023-12-22 | End: 2024-02-02

## 2023-12-22 RX ORDER — LOSARTAN POTASSIUM 100 MG/1
0.5 TABLET, FILM COATED ORAL
Qty: 15 | Refills: 0
Start: 2023-12-22 | End: 2024-01-20

## 2023-12-22 RX ORDER — METOPROLOL TARTRATE 50 MG
25 TABLET ORAL DAILY
Refills: 0 | Status: DISCONTINUED | OUTPATIENT
Start: 2023-12-22 | End: 2023-12-22

## 2023-12-22 RX ORDER — INFLUENZA VIRUS VACCINE 15; 15; 15; 15 UG/.5ML; UG/.5ML; UG/.5ML; UG/.5ML
0.5 SUSPENSION INTRAMUSCULAR ONCE
Refills: 0 | Status: DISCONTINUED | OUTPATIENT
Start: 2023-12-22 | End: 2023-12-22

## 2023-12-22 RX ADMIN — LOSARTAN POTASSIUM 12.5 MILLIGRAM(S): 100 TABLET, FILM COATED ORAL at 13:28

## 2023-12-22 RX ADMIN — ENOXAPARIN SODIUM 40 MILLIGRAM(S): 100 INJECTION SUBCUTANEOUS at 09:03

## 2023-12-22 RX ADMIN — Medication 25 MILLIGRAM(S): at 09:03

## 2023-12-22 NOTE — PROGRESS NOTE ADULT - PROBLEM SELECTOR PROBLEM 1
Acute HFrEF (heart failure with reduced ejection fraction)

## 2023-12-22 NOTE — PROGRESS NOTE ADULT - PROBLEM SELECTOR PLAN 3
CIWA protocol per primary team  Advised to abstain from drinking alcohol. Pt verbalized understanding.
- Supportive management per primary team.
- Supportive management per primary team.
CIWA protocol per primary team  Advised to abstain from drinking alcohol.
Ideally patient should be cardioverted  but patient is not relievable to take anticoagulin post procedure  rate control for now    Cardiac meds  metoprolol succinate ER 25 milliGRAM(s) Oral at bedtime  multivitamin 1 Tablet(s) Oral daily  spironolactone 25 milliGRAM(s) Oral   Lasix 40mg to be restated today    Prognosis remains guarded due to non compliance with meds and substance abuse

## 2023-12-22 NOTE — PROGRESS NOTE ADULT - REASON FOR ADMISSION
Dyspnea on exertion

## 2023-12-22 NOTE — PROGRESS NOTE ADULT - TIME BILLING
Acute HFrEF, NICM, Chronic alcoholism
- Generation of cardiovascular treatment plan.  - Coordination of care with primary team.
- Generation of cardiovascular treatment plan.  - Coordination of care with primary team.
Acute HFrEF, Dilated cardiomyopathy, Chronic alcohol abuse
- Generation of cardiovascular treatment plan.  - Coordination of care with primary team.

## 2023-12-22 NOTE — PROGRESS NOTE ADULT - SUBJECTIVE AND OBJECTIVE BOX
[facility] Advanced Heart Failure - Progress Note  [address] [place] [address]  Office Phone: [phone]/Fax: [phone]  Service/On Call Phone [phone]    Subjective/Objective: NAEO. ~10 seconds WCT on tele (pt did not receive BB yesterday).    Medications:  acetaminophen     Tablet .. 650 milliGRAM(s) Oral every 6 hours PRN  albuterol    90 MICROgram(s) HFA Inhaler 2 Puff(s) Inhalation every 6 hours  aluminum hydroxide/magnesium hydroxide/simethicone Suspension 30 milliLiter(s) Oral every 4 hours PRN  enoxaparin Injectable 40 milliGRAM(s) SubCutaneous every 12 hours  guaifenesin/dextromethorphan Oral Liquid 20 milliLiter(s) Oral four times a day PRN  losartan 12.5 milliGRAM(s) Oral <User Schedule>  melatonin 3 milliGRAM(s) Oral at bedtime PRN  metoprolol succinate ER 25 milliGRAM(s) Oral daily  ondansetron Injectable 4 milliGRAM(s) IV Push every 8 hours PRN    Vital Signs Last 24 Hours  T(C): 36.7 (12-22-23 @ 05:08), Max: 36.7 (12-22-23 @ 05:08)  HR: 74 (12-22-23 @ 05:55) (74 - 84)  BP: 95/76 (12-22-23 @ 05:55) (95/66 - 107/79)  RR: 18 (12-22-23 @ 05:55) (18 - 18)  SpO2: 95% (12-22-23 @ 05:55) (93% - 95%)    Tele: SR, PVCs, couplets, ~10seconds WCT    Physical Exam:  General: Sitting up in chair, in no distress.   HEENT: EOMs intact.  Neck: Neck supple. JVP not elevated.   Chest: Clear to auscultation bilaterally.  CV: RRR. Normal S1 and S2. No murmurs, rub, or gallops. Lukewarm peripherally.  PV: No LE edema noted. Pulses full/equal in all four extremities.  Abdomen: Soft, non-distended, non-tender.  Skin: warm, dry, no rash.  Neurology: Alert and oriented times three. Sensation intact.  Psych: Appropriate affect.   [facility] Advanced Heart Failure - Progress Note  [address] [place] [address]  Office Phone: [phone]/Fax: [phone]  Service/On Call Phone [phone]    Subjective/Objective: NAEO. ~10 seconds WCT on tele (pt did not receive BB yesterday).    Medications:  acetaminophen     Tablet .. 650 milliGRAM(s) Oral every 6 hours PRN  albuterol    90 MICROgram(s) HFA Inhaler 2 Puff(s) Inhalation every 6 hours  aluminum hydroxide/magnesium hydroxide/simethicone Suspension 30 milliLiter(s) Oral every 4 hours PRN  enoxaparin Injectable 40 milliGRAM(s) SubCutaneous every 12 hours  guaifenesin/dextromethorphan Oral Liquid 20 milliLiter(s) Oral four times a day PRN  losartan 12.5 milliGRAM(s) Oral <User Schedule>  melatonin 3 milliGRAM(s) Oral at bedtime PRN  metoprolol succinate ER 25 milliGRAM(s) Oral daily  ondansetron Injectable 4 milliGRAM(s) IV Push every 8 hours PRN    Vital Signs Last 24 Hours  T(C): 36.7 (12-22-23 @ 05:08), Max: 36.7 (12-22-23 @ 05:08)  HR: 74 (12-22-23 @ 05:55) (74 - 84)  BP: 95/76 (12-22-23 @ 05:55) (95/66 - 107/79)  RR: 18 (12-22-23 @ 05:55) (18 - 18)  SpO2: 95% (12-22-23 @ 05:55) (93% - 95%)    Tele: SR, PVCs, couplets, ~10seconds WCT    Physical Exam:  General: Sitting up in chair, in no distress.   HEENT: EOMs intact.  Neck: Neck supple. JVP not elevated.   Chest: Clear to auscultation bilaterally.  CV: RRR. Normal S1 and S2. No murmurs, rub, or gallops. Lukewarm peripherally.  PV: No LE edema noted. Pulses full/equal in all four extremities.  Abdomen: Soft, non-distended, non-tender.  Skin: warm, dry, no rash.  Neurology: Alert and oriented times three. Sensation intact.  Psych: Appropriate affect.

## 2023-12-22 NOTE — PROGRESS NOTE ADULT - NS ATTEND AMEND GEN_ALL_CORE FT
Patient seen and examined at bedside.   Gave specific instructions to not hold GDMT unless BP less than 85. Today he reiceved Toprol and losartan. In general his BP starting to get higher  Patient states he is committed to not drink and will have close followup with our team  We have made an appointment with our office on January 2nd

## 2023-12-22 NOTE — PROGRESS NOTE ADULT - PROBLEM SELECTOR PLAN 1
ACC/AHA Stage C, NYHA class II-III symptoms  Newly diagnosed NICM, dilated cardiomyopathy (LVEF <10%, LVIDd 6.7cm, severe RV dysfunction)  [facility] with normal coronaries. Possible etiologies include Etoh abuse, +Trypanosoma AB (chronic).  Clinically euvolemic w/ warm extremities.  Preserved end organ function.  GDMT: Limited by hypotension (orthostatic BP negative) and cost due to uninsured status. Please stagger medications throughout the day.    -ACEi/ARB/ARNI -  Losartan 12.5 mg daily (hold for SBP <80).    -BB - Toprol-XL 25 mg nightly    -MRA - can attempt to resume as outpt    -SGLT2i - deferred due to cost.    Diuretics: standing lasix not currently indicated.   Low Na+, FR diet recommended.  Follow-up visit has been arranged at the [facility] in [place] on 1/3 with Dr. Odom however given significant cardiomyopathy he should also f/u in our HF clinic. An appointment has been arranged for 1/2 at 1:45pm with AJAY Johnson. He will need to apply on his own for sliding scale; contact # [phone]. ACC/AHA Stage C, NYHA class II-III symptoms  Newly diagnosed NICM, dilated cardiomyopathy (LVEF <10%, LVIDd 6.7cm, severe RV dysfunction)  [facility] with normal coronaries. Possible etiologies include Etoh abuse, +Trypanosoma AB (chronic).  Clinically euvolemic w/ warm extremities.  Preserved end organ function.  GDMT: Limited by hypotension (orthostatic BP negative) and cost due to uninsured status. Please stagger medications throughout the day.    -ACEi/ARB/ARNI -  Losartan 12.5 mg daily (hold for SBP <80).    -BB - Toprol-XL 25 mg nightly    -MRA - can attempt to resume as outpt    -SGLT2i - deferred due to cost.    Diuretics: standing lasix not currently indicated.   Low Na+, FR diet recommended.  Follow-up visit has been arranged at the [facility] in [place] on 1/3 with Dr. Odom however given significant cardiomyopathy he should also f/u in our HF clinic. An appointment has been arranged for 1/2 at 1:45pm with AJAY Johnson. He will need to apply on his own for sliding scale; contact # [phone].

## 2023-12-22 NOTE — PROGRESS NOTE ADULT - PROBLEM SELECTOR PROBLEM 2
NSVT (nonsustained ventricular tachycardia)
NSVT (nonsustained ventricular tachycardia)
Alcohol abuse
NSVT (nonsustained ventricular tachycardia)
Alcohol abuse
NSVT (nonsustained ventricular tachycardia)
No

## 2023-12-22 NOTE — PROGRESS NOTE ADULT - PROBLEM SELECTOR PROBLEM 3
RSV infection
Alcohol abuse
RSV infection
Alcohol abuse
Atrial flutter
Alcohol abuse

## 2023-12-22 NOTE — PROGRESS NOTE ADULT - PROVIDER SPECIALTY LIST ADULT
Heart Failure
Hospitalist
Cardiology
Hospitalist
Intervent Cardiology
Hospitalist
Cardiology
Hospitalist
Cardiology
Hospitalist
Heart Failure
Cardiology
Cardiology

## 2023-12-22 NOTE — PROGRESS NOTE ADULT - NS_MD_PANP_GEN_ALL_CORE

## 2023-12-22 NOTE — PROGRESS NOTE ADULT - ASSESSMENT
Initial HF Consult: Dr. Hall    52 y/o Sami speaking male originally from [place], [place] with history of Etoh use (currently endorses drinking 2 shots of tequila/day which is much less than previously) presented to ED 12/12 with c/o dyspnea for ~1week.   Tested positive for RSV, +cough. CXR revealed clear lungs and cardiomegaly. EKG with bifascicular block, possible old inferolateral infarct and frequent ventricular ectopy. Troponin negative x 3.     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   ProBNP 7690. Treated with IV Lasix 40mg BID. TSH wdl, HIV/Hep neg. 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  He is s/p R/LHC 12/15/23 which showed normal coronary arteries, elevated left sided filling pressures, and CI 2.1. Will continue to optimize his GDMT as able and follow-up closely with him as an outpatient.    Cardiac Studies:  12/15/23 LHC: Normal coronary arteries. Ao 101/82 (87), RA 10, RV 36/11, PAP 33/24/27, PCWO 29, CO/CI 3.8/2.1, LVEDP 25.  12/15/23 RHC: RA 10, RV 36/11, PA 33/24/27, PCW 29 (v35), PA sat 64.6%, Christophe CO/CI 3.82/2.07.    12/13/23 TTE: LVIDd 6.7cm, LVEF <10%, grade III DD, severe RVE with severe RV dysfunction (TAPSE 1.0cm), severe RODNEY, mild MR, severe TR, mod-severe NH, est PASP 46 mmHg. Initial HF Consult: Dr. Hall    52 y/o Italian speaking male originally from [facility] with history of Etoh use (currently endorses drinking 2 shots of tequila/day which is much less than previously) presented to ED 12/12 with c/o dyspnea for ~1week.   Tested positive for RSV, +cough. CXR revealed clear lungs and cardiomegaly. EKG with bifascicular block, possible old inferolateral infarct and frequent ventricular ectopy. Troponin negative x 3.     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   ProBNP 7690. Treated with IV Lasix 40mg BID. TSH wdl, HIV/Hep neg. 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  He is s/p R/LHC 12/15/23 which showed normal coronary arteries, elevated left sided filling pressures, and CI 2.1. Will continue to optimize his GDMT as able and follow-up closely with him as an outpatient.    Cardiac Studies:  12/15/23 LHC: Normal coronary arteries. Ao 101/82 (87), RA 10, RV 36/11, PAP 33/24/27, PCWO 29, CO/CI 3.8/2.1, LVEDP 25.  12/15/23 RHC: RA 10, RV 36/11, PA 33/24/27, PCW 29 (v35), PA sat 64.6%, Christophe CO/CI 3.82/2.07.    12/13/23 TTE: LVIDd 6.7cm, LVEF <10%, grade III DD, severe RVE with severe RV dysfunction (TAPSE 1.0cm), severe RODNEY, mild MR, severe TR, mod-severe IN, est PASP 46 mmHg.

## 2023-12-22 NOTE — PATIENT PROFILE ADULT - FALL HARM RISK - UNIVERSAL INTERVENTIONS
Bed in lowest position, wheels locked, appropriate side rails in place/Call bell, personal items and telephone in reach/Instruct patient to call for assistance before getting out of bed or chair/Non-slip footwear when patient is out of bed/[place] to call system/Physically safe environment - no spills, clutter or unnecessary equipment/Purposeful Proactive Rounding/Room/bathroom lighting operational, light cord in reach Bed in lowest position, wheels locked, appropriate side rails in place/Call bell, personal items and telephone in reach/Instruct patient to call for assistance before getting out of bed or chair/Non-slip footwear when patient is out of bed/[place] to call system/Physically safe environment - no spills, clutter or unnecessary equipment/Purposeful Proactive Rounding/Room/bathroom lighting operational, light cord in reach

## 2023-12-29 DIAGNOSIS — F10.10 ALCOHOL ABUSE, UNCOMPLICATED: ICD-10-CM

## 2023-12-29 DIAGNOSIS — I47.29 OTHER VENTRICULAR TACHYCARDIA: ICD-10-CM

## 2023-12-29 DIAGNOSIS — Z86.19 PERSONAL HISTORY OF OTHER INFECTIOUS AND PARASITIC DISEASES: ICD-10-CM

## 2023-12-29 DIAGNOSIS — I50.20 UNSPECIFIED SYSTOLIC (CONGESTIVE) HEART FAILURE: ICD-10-CM

## 2023-12-29 RX ORDER — METOPROLOL SUCCINATE 25 MG/1
25 TABLET, EXTENDED RELEASE ORAL
Qty: 90 | Refills: 1 | Status: ACTIVE | COMMUNITY

## 2024-01-02 ENCOUNTER — APPOINTMENT (OUTPATIENT)
Dept: HEART FAILURE | Facility: CLINIC | Age: 54
End: 2024-01-02

## 2024-01-02 DIAGNOSIS — B57.2 CHAGAS' DISEASE (CHRONIC) WITH HEART INVOLVEMENT: ICD-10-CM

## 2024-01-03 ENCOUNTER — EMERGENCY (EMERGENCY)
Facility: HOSPITAL | Age: 54
LOS: 1 days | Discharge: HOME | End: 2024-01-03
Attending: EMERGENCY MEDICINE | Admitting: EMERGENCY MEDICINE
Payer: MEDICAID

## 2024-01-03 VITALS
RESPIRATION RATE: 18 BRPM | HEART RATE: 92 BPM | DIASTOLIC BLOOD PRESSURE: 74 MMHG | TEMPERATURE: 98 F | SYSTOLIC BLOOD PRESSURE: 103 MMHG | HEIGHT: 60 IN | WEIGHT: 165.35 LBS | OXYGEN SATURATION: 98 %

## 2024-01-03 DIAGNOSIS — Z98.890 OTHER SPECIFIED POSTPROCEDURAL STATES: Chronic | ICD-10-CM

## 2024-01-03 LAB
ALBUMIN SERPL ELPH-MCNC: 4 G/DL — SIGNIFICANT CHANGE UP (ref 3.3–5.2)
ALBUMIN SERPL ELPH-MCNC: 4 G/DL — SIGNIFICANT CHANGE UP (ref 3.3–5.2)
ALP SERPL-CCNC: 132 U/L — HIGH (ref 40–120)
ALP SERPL-CCNC: 132 U/L — HIGH (ref 40–120)
ALT FLD-CCNC: 32 U/L — SIGNIFICANT CHANGE UP
ALT FLD-CCNC: 32 U/L — SIGNIFICANT CHANGE UP
ANION GAP SERPL CALC-SCNC: 11 MMOL/L — SIGNIFICANT CHANGE UP (ref 5–17)
ANION GAP SERPL CALC-SCNC: 11 MMOL/L — SIGNIFICANT CHANGE UP (ref 5–17)
AST SERPL-CCNC: 26 U/L — SIGNIFICANT CHANGE UP
AST SERPL-CCNC: 26 U/L — SIGNIFICANT CHANGE UP
BASOPHILS # BLD AUTO: 0.03 K/UL — SIGNIFICANT CHANGE UP (ref 0–0.2)
BASOPHILS # BLD AUTO: 0.03 K/UL — SIGNIFICANT CHANGE UP (ref 0–0.2)
BASOPHILS NFR BLD AUTO: 0.5 % — SIGNIFICANT CHANGE UP (ref 0–2)
BASOPHILS NFR BLD AUTO: 0.5 % — SIGNIFICANT CHANGE UP (ref 0–2)
BILIRUB SERPL-MCNC: 0.9 MG/DL — SIGNIFICANT CHANGE UP (ref 0.4–2)
BILIRUB SERPL-MCNC: 0.9 MG/DL — SIGNIFICANT CHANGE UP (ref 0.4–2)
BUN SERPL-MCNC: 12.2 MG/DL — SIGNIFICANT CHANGE UP (ref 8–20)
BUN SERPL-MCNC: 12.2 MG/DL — SIGNIFICANT CHANGE UP (ref 8–20)
CALCIUM SERPL-MCNC: 9.3 MG/DL — SIGNIFICANT CHANGE UP (ref 8.4–10.5)
CALCIUM SERPL-MCNC: 9.3 MG/DL — SIGNIFICANT CHANGE UP (ref 8.4–10.5)
CHLORIDE SERPL-SCNC: 102 MMOL/L — SIGNIFICANT CHANGE UP (ref 96–108)
CHLORIDE SERPL-SCNC: 102 MMOL/L — SIGNIFICANT CHANGE UP (ref 96–108)
CO2 SERPL-SCNC: 26 MMOL/L — SIGNIFICANT CHANGE UP (ref 22–29)
CO2 SERPL-SCNC: 26 MMOL/L — SIGNIFICANT CHANGE UP (ref 22–29)
CREAT SERPL-MCNC: 1.05 MG/DL — SIGNIFICANT CHANGE UP (ref 0.5–1.3)
CREAT SERPL-MCNC: 1.05 MG/DL — SIGNIFICANT CHANGE UP (ref 0.5–1.3)
EGFR: 84 ML/MIN/1.73M2 — SIGNIFICANT CHANGE UP
EGFR: 84 ML/MIN/1.73M2 — SIGNIFICANT CHANGE UP
EOSINOPHIL # BLD AUTO: 0.01 K/UL — SIGNIFICANT CHANGE UP (ref 0–0.5)
EOSINOPHIL # BLD AUTO: 0.01 K/UL — SIGNIFICANT CHANGE UP (ref 0–0.5)
EOSINOPHIL NFR BLD AUTO: 0.2 % — SIGNIFICANT CHANGE UP (ref 0–6)
EOSINOPHIL NFR BLD AUTO: 0.2 % — SIGNIFICANT CHANGE UP (ref 0–6)
ETHANOL SERPL-MCNC: <10 MG/DL — SIGNIFICANT CHANGE UP (ref 0–9)
ETHANOL SERPL-MCNC: <10 MG/DL — SIGNIFICANT CHANGE UP (ref 0–9)
FLUAV AG NPH QL: SIGNIFICANT CHANGE UP
FLUAV AG NPH QL: SIGNIFICANT CHANGE UP
FLUBV AG NPH QL: SIGNIFICANT CHANGE UP
FLUBV AG NPH QL: SIGNIFICANT CHANGE UP
GLUCOSE SERPL-MCNC: 94 MG/DL — SIGNIFICANT CHANGE UP (ref 70–99)
GLUCOSE SERPL-MCNC: 94 MG/DL — SIGNIFICANT CHANGE UP (ref 70–99)
HCT VFR BLD CALC: 39.9 % — SIGNIFICANT CHANGE UP (ref 39–50)
HCT VFR BLD CALC: 39.9 % — SIGNIFICANT CHANGE UP (ref 39–50)
HGB BLD-MCNC: 14.2 G/DL — SIGNIFICANT CHANGE UP (ref 13–17)
HGB BLD-MCNC: 14.2 G/DL — SIGNIFICANT CHANGE UP (ref 13–17)
IMM GRANULOCYTES NFR BLD AUTO: 0.2 % — SIGNIFICANT CHANGE UP (ref 0–0.9)
IMM GRANULOCYTES NFR BLD AUTO: 0.2 % — SIGNIFICANT CHANGE UP (ref 0–0.9)
LYMPHOCYTES # BLD AUTO: 1.32 K/UL — SIGNIFICANT CHANGE UP (ref 1–3.3)
LYMPHOCYTES # BLD AUTO: 1.32 K/UL — SIGNIFICANT CHANGE UP (ref 1–3.3)
LYMPHOCYTES # BLD AUTO: 23.4 % — SIGNIFICANT CHANGE UP (ref 13–44)
LYMPHOCYTES # BLD AUTO: 23.4 % — SIGNIFICANT CHANGE UP (ref 13–44)
MCHC RBC-ENTMCNC: 32.2 PG — SIGNIFICANT CHANGE UP (ref 27–34)
MCHC RBC-ENTMCNC: 32.2 PG — SIGNIFICANT CHANGE UP (ref 27–34)
MCHC RBC-ENTMCNC: 35.6 GM/DL — SIGNIFICANT CHANGE UP (ref 32–36)
MCHC RBC-ENTMCNC: 35.6 GM/DL — SIGNIFICANT CHANGE UP (ref 32–36)
MCV RBC AUTO: 90.5 FL — SIGNIFICANT CHANGE UP (ref 80–100)
MCV RBC AUTO: 90.5 FL — SIGNIFICANT CHANGE UP (ref 80–100)
MONOCYTES # BLD AUTO: 0.5 K/UL — SIGNIFICANT CHANGE UP (ref 0–0.9)
MONOCYTES # BLD AUTO: 0.5 K/UL — SIGNIFICANT CHANGE UP (ref 0–0.9)
MONOCYTES NFR BLD AUTO: 8.8 % — SIGNIFICANT CHANGE UP (ref 2–14)
MONOCYTES NFR BLD AUTO: 8.8 % — SIGNIFICANT CHANGE UP (ref 2–14)
NEUTROPHILS # BLD AUTO: 3.78 K/UL — SIGNIFICANT CHANGE UP (ref 1.8–7.4)
NEUTROPHILS # BLD AUTO: 3.78 K/UL — SIGNIFICANT CHANGE UP (ref 1.8–7.4)
NEUTROPHILS NFR BLD AUTO: 66.9 % — SIGNIFICANT CHANGE UP (ref 43–77)
NEUTROPHILS NFR BLD AUTO: 66.9 % — SIGNIFICANT CHANGE UP (ref 43–77)
NT-PROBNP SERPL-SCNC: 7314 PG/ML — HIGH (ref 0–300)
NT-PROBNP SERPL-SCNC: 7314 PG/ML — HIGH (ref 0–300)
PLATELET # BLD AUTO: 157 K/UL — SIGNIFICANT CHANGE UP (ref 150–400)
PLATELET # BLD AUTO: 157 K/UL — SIGNIFICANT CHANGE UP (ref 150–400)
POTASSIUM SERPL-MCNC: 4.5 MMOL/L — SIGNIFICANT CHANGE UP (ref 3.5–5.3)
POTASSIUM SERPL-MCNC: 4.5 MMOL/L — SIGNIFICANT CHANGE UP (ref 3.5–5.3)
POTASSIUM SERPL-SCNC: 4.5 MMOL/L — SIGNIFICANT CHANGE UP (ref 3.5–5.3)
POTASSIUM SERPL-SCNC: 4.5 MMOL/L — SIGNIFICANT CHANGE UP (ref 3.5–5.3)
PROT SERPL-MCNC: 7.1 G/DL — SIGNIFICANT CHANGE UP (ref 6.6–8.7)
PROT SERPL-MCNC: 7.1 G/DL — SIGNIFICANT CHANGE UP (ref 6.6–8.7)
RBC # BLD: 4.41 M/UL — SIGNIFICANT CHANGE UP (ref 4.2–5.8)
RBC # BLD: 4.41 M/UL — SIGNIFICANT CHANGE UP (ref 4.2–5.8)
RBC # FLD: 12.6 % — SIGNIFICANT CHANGE UP (ref 10.3–14.5)
RBC # FLD: 12.6 % — SIGNIFICANT CHANGE UP (ref 10.3–14.5)
RSV RNA NPH QL NAA+NON-PROBE: SIGNIFICANT CHANGE UP
RSV RNA NPH QL NAA+NON-PROBE: SIGNIFICANT CHANGE UP
SARS-COV-2 RNA SPEC QL NAA+PROBE: SIGNIFICANT CHANGE UP
SARS-COV-2 RNA SPEC QL NAA+PROBE: SIGNIFICANT CHANGE UP
SODIUM SERPL-SCNC: 139 MMOL/L — SIGNIFICANT CHANGE UP (ref 135–145)
SODIUM SERPL-SCNC: 139 MMOL/L — SIGNIFICANT CHANGE UP (ref 135–145)
TROPONIN T, HIGH SENSITIVITY RESULT: 28 NG/L — SIGNIFICANT CHANGE UP (ref 0–51)
TROPONIN T, HIGH SENSITIVITY RESULT: 28 NG/L — SIGNIFICANT CHANGE UP (ref 0–51)
TROPONIN T, HIGH SENSITIVITY RESULT: 29 NG/L — SIGNIFICANT CHANGE UP (ref 0–51)
TROPONIN T, HIGH SENSITIVITY RESULT: 29 NG/L — SIGNIFICANT CHANGE UP (ref 0–51)
WBC # BLD: 5.65 K/UL — SIGNIFICANT CHANGE UP (ref 3.8–10.5)
WBC # BLD: 5.65 K/UL — SIGNIFICANT CHANGE UP (ref 3.8–10.5)
WBC # FLD AUTO: 5.65 K/UL — SIGNIFICANT CHANGE UP (ref 3.8–10.5)
WBC # FLD AUTO: 5.65 K/UL — SIGNIFICANT CHANGE UP (ref 3.8–10.5)

## 2024-01-03 PROCEDURE — 93010 ELECTROCARDIOGRAM REPORT: CPT

## 2024-01-03 PROCEDURE — 99283 EMERGENCY DEPT VISIT LOW MDM: CPT

## 2024-01-03 PROCEDURE — 99223 1ST HOSP IP/OBS HIGH 75: CPT

## 2024-01-03 PROCEDURE — 71046 X-RAY EXAM CHEST 2 VIEWS: CPT | Mod: 26

## 2024-01-03 RX ORDER — METOPROLOL TARTRATE 50 MG
25 TABLET ORAL AT BEDTIME
Refills: 0 | Status: DISCONTINUED | OUTPATIENT
Start: 2024-01-03 | End: 2024-01-04

## 2024-01-03 RX ORDER — METOPROLOL TARTRATE 50 MG
25 TABLET ORAL DAILY
Refills: 0 | Status: DISCONTINUED | OUTPATIENT
Start: 2024-01-03 | End: 2024-01-04

## 2024-01-03 RX ORDER — LOSARTAN POTASSIUM 100 MG/1
12.5 TABLET, FILM COATED ORAL DAILY
Refills: 0 | Status: DISCONTINUED | OUTPATIENT
Start: 2024-01-04 | End: 2024-01-04

## 2024-01-03 RX ORDER — LOSARTAN POTASSIUM 100 MG/1
12.5 TABLET, FILM COATED ORAL DAILY
Refills: 0 | Status: DISCONTINUED | OUTPATIENT
Start: 2024-01-03 | End: 2024-01-04

## 2024-01-03 NOTE — ED CDU PROVIDER INITIAL DAY NOTE - ATTENDING APP SHARED VISIT CONTRIBUTION OF CARE
recent new diagnosis of heart failure here with exertional sob for a few days. delta troponin negative. no chest pain. consulted [facility] cardiology and recommending observation for heart failure team to see patient in AM.

## 2024-01-03 NOTE — ED ADULT NURSE REASSESSMENT NOTE - NS ED NURSE REASSESS COMMENT FT1
Assumed care of patient at approx 19:30. Patient AxOx4. Patient denies any pain/discomfort. Patient has been updated on the plan of care. Patient offers no complaints at this time. No visible signs of acute distress noted, safety maintained.

## 2024-01-03 NOTE — CONSULT NOTE ADULT - PROBLEM SELECTOR RECOMMENDATION 2
Educated on risks of continued use of alcohol, level <10 tonight  - no need for CIWA coverage, will follow on Tele    case d/w Dr. Carter

## 2024-01-03 NOTE — CONSULT NOTE ADULT - ASSESSMENT
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  Now arrives from home w/ worsening shortness of breath for past three days and so came in for reevaluation.  States he feels significant dyspnea on exertion, otherwise denies chest pain, palpitations, diaphoresis, cough, fever, or leg swelling.  Skipped his Losartan today, otherwise states he's compliant.  Followed by heart failure team.  Cardiology called for BARCENAS.  pro-BNP: 7300  Trop: 28-->29

## 2024-01-03 NOTE — CONSULT NOTE ADULT - SUBJECTIVE AND OBJECTIVE BOX
[facility]                                              CARDIOLOGY AT [place] [address]                                             Telephone: [phone]. Fax:[phone]                                                       CARDIOLOGY CONSULTATION NOTE                                                                                             History obtained by: Patient and medical record  Community Cardiologist: [facility]   obtained: Yes [  ] No [  ]  Reason for Consultation: SOB  Available out pt records reviewed: Yes [x] No [  ]    Chief complaint:  worsening shortness of breath came i    HPI: Patient is a 54yo M w/ PMHx of EtOH abuse (2 shots of tequila/day), dilated cardiomyopathy (LVEF 10-15%, LVIDd >6.5cm), BiV dysfunction, 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  Now arrives from home w/ worsening shortness of breath for past three days and so came in for reevaluation.  States he feels significant dyspnea on exertion, otherwise denies chest pain, palpitations, diaphoresis, cough, fever, or leg swelling.  Skipped his Losartan today, otherwise states he's compliant.  Followed by heart failure team.  Cardiology called for BARCENAS.  pro-BNP: 7300  Trop: 28-->29       CARDIAC TESTING   ECHO: CONCLUSIONS:  1. Limited POCUS bedside Echocardiogram.  2. Left ventricular cavity is severely dilated (LVIDd >6.5 cm). Left ventricular systolic function is severely decreased with an ejection fraction visually estimated at 10-15 %.  3. RV size appears mildly dilated with reduced systolic function.  4. Tethered mitral valve with regurgitation.  5. No pericardial effusion  6. R-pleural effusion noted.  7. No prior echocardiogram is available for comparison.    CATH: Procedures:  1.    Arterial Access - Right Radial   2.    Venous Access - Right Brachial   3.    RHC   4.    Left Heart Cath   5.    Diagnostic Coronary Angiography   Indications:               Congestive heart failure, acute systolic   Diagnostic Conclusions:   Normal Coronary Arteries   Severely Reduced LV Function (LVEF=10% and LVEDP=25 mmHg)   Mild Elevation of Right Heart Pressures:   RA=10 mmHg; RV=36/11 mmHg; PCWP=29 mmHg; PVR=0 RAMIREZ   Mild Isolated Post-capillary Pulmonary Hypertension=33/24=27 mmHg   Reduced CO=3.8 L/min and CI=2.1 L/min/m2   Reduced LEE=0.9   Reduced =0.7W   Recommendations:   Aggressive medical therapy, Close monitoring of BUN and creatinine, Cardiac rehabilitation program, Abstinence from alcohol R/O Chagas disease   Acute complication:    No complications     PAST MEDICAL HISTORY  Alcohol abuse  Heart failure HFrEF     PAST SURGICAL HISTORY  No significant past surgical history  History of repair of laceration    SOCIAL HISTORY:  + smoking/ + alcohol    FAMILY HISTORY: No pertinent family history in first degree relatives    Family History of Cardiovascular Disease:  Yes [  ] No [  ]  Coronary Artery Disease in first degree relative: Yes [  ] No [  ]  Sudden Cardiac Death in First degree relative: Yes [  ] No [  ]    HOME MEDICATIONS:  Losartan 25mg oral daily    CURRENT CARDIAC MEDICATIONS:  Metoprolol ER 25mg oral daily     ALLERGIES: No Known Allergies    REVIEW OF SYMPTOMS:   CONSTITUTIONAL: No fever, no chills, no weight gain, + fatigue   ENMT: No sinus or throat pain  NECK: No pain or stiffness  CARDIOVASCULAR: no chest pain, + BARCENAS, no syncope/presyncope, no palpitations, no dizziness, no Orthopnea, no Paroxsymal nocturnal dyspnea  RESPIRATORY: no shortness of breath, no cough, no wheezing  : No dysuria, no hematuria   GI: no nausea, no diarrhea, no constipation, no abdominal pain   NEURO: No headache, no slurred speech   MUSCULOSKELETAL: No joint pain or swelling  PSYCH: No agitation, no anxiety    ALL OTHER REVIEW OF SYSTEMS ARE NEGATIVE.    VITAL SIGNS:  T(C): 36.9 (01-03-24 @ 20:00), Max: 36.9 (01-03-24 @ 20:00)  T(F): 98.4 (01-03-24 @ 20:00), Max: 98.4 (01-03-24 @ 20:00)  HR: 94 (01-03-24 @ 20:00) (92 - 94)  BP: 122/55 (01-03-24 @ 20:00) (103/74 - 122/55)  RR: 18 (01-03-24 @ 20:00) (18 - 18)  SpO2: 98% (01-03-24 @ 20:00) (98% - 98%)    INTAKE AND OUTPUT:     PHYSICAL EXAM:  Constitutional: Comfortable, no acute distress   HEENT: Atraumatic, neck is supple, no JVD  CNS: A&Ox3. No focal deficits   Respiratory: CTAB, unlabored   Cardiovascular: RRR normal S1S2, no murmur or rubs  Gastrointestinal: Soft, non-tender   Extremities: 2+ Peripheral Pulses, no cyanosis, or edema  Psychiatric: Calm, no agitation   Skin: Warm and dry, no ulcers on extremities     LABS:                      14.2   5.65  )-----------( 157      ( 03 Jan 2024 17:10 )             39.9     01-03    139  |  102  |  12.2  ----------------------------<  94  4.5   |  26.0  |  1.05    Ca    9.3      03 Jan 2024 17:10    TPro  7.1  /  Alb  4.0  /  TBili  0.9  /  DBili  x   /  AST  26  /  ALT  32  /  AlkPhos  132<H>  01-03    Urinalysis Basic - ( 03 Jan 2024 17:10 )  Color: x / Appearance: x / SG: x / pH: x  Gluc: 94 mg/dL / Ketone: x  / Bili: x / Urobili: x   Blood: x / Protein: x / Nitrite: x   Leuk Esterase: x / RBC: x / WBC x   Sq Epi: x / Non Sq Epi: x / Bacteria: x    INTERPRETATION OF TELEMETRY: SR  ECG: NSR w/ occ PVCs  Prior ECG: Yes [x] No [  ]    RADIOLOGY & ADDITIONAL STUDIES:   X-ray:  Pen                                              [facility]                                              CARDIOLOGY AT [place] [address]                                             Telephone: [phone]. Fax:[phone]                                                       CARDIOLOGY CONSULTATION NOTE                                                                                             History obtained by: Patient and medical record  Community Cardiologist: [facility]   obtained: Yes [  ] No [  ]  Reason for Consultation: SOB  Available out pt records reviewed: Yes [x] No [  ]    Chief complaint:  worsening shortness of breath came i    HPI: Patient is a 54yo M w/ PMHx of EtOH abuse (2 shots of tequila/day), dilated cardiomyopathy (LVEF 10-15%, LVIDd >6.5cm), BiV dysfunction, 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  Now arrives from home w/ worsening shortness of breath for past three days and so came in for reevaluation.  States he feels significant dyspnea on exertion, otherwise denies chest pain, palpitations, diaphoresis, cough, fever, or leg swelling.  Skipped his Losartan today, otherwise states he's compliant.  Followed by heart failure team.  Cardiology called for BARCENAS.  pro-BNP: 7300  Trop: 28-->29       CARDIAC TESTING   ECHO: CONCLUSIONS:  1. Limited POCUS bedside Echocardiogram.  2. Left ventricular cavity is severely dilated (LVIDd >6.5 cm). Left ventricular systolic function is severely decreased with an ejection fraction visually estimated at 10-15 %.  3. RV size appears mildly dilated with reduced systolic function.  4. Tethered mitral valve with regurgitation.  5. No pericardial effusion  6. R-pleural effusion noted.  7. No prior echocardiogram is available for comparison.    CATH: Procedures:  1.    Arterial Access - Right Radial   2.    Venous Access - Right Brachial   3.    RHC   4.    Left Heart Cath   5.    Diagnostic Coronary Angiography   Indications:               Congestive heart failure, acute systolic   Diagnostic Conclusions:   Normal Coronary Arteries   Severely Reduced LV Function (LVEF=10% and LVEDP=25 mmHg)   Mild Elevation of Right Heart Pressures:   RA=10 mmHg; RV=36/11 mmHg; PCWP=29 mmHg; PVR=0 RAMIREZ   Mild Isolated Post-capillary Pulmonary Hypertension=33/24=27 mmHg   Reduced CO=3.8 L/min and CI=2.1 L/min/m2   Reduced LEE=0.9   Reduced =0.7W   Recommendations:   Aggressive medical therapy, Close monitoring of BUN and creatinine, Cardiac rehabilitation program, Abstinence from alcohol R/O Chagas disease   Acute complication:    No complications     PAST MEDICAL HISTORY  Alcohol abuse  Heart failure HFrEF     PAST SURGICAL HISTORY  No significant past surgical history  History of repair of laceration    SOCIAL HISTORY:  + smoking/ + alcohol    FAMILY HISTORY: No pertinent family history in first degree relatives    Family History of Cardiovascular Disease:  Yes [  ] No [  ]  Coronary Artery Disease in first degree relative: Yes [  ] No [  ]  Sudden Cardiac Death in First degree relative: Yes [  ] No [  ]    HOME MEDICATIONS:  Losartan 25mg oral daily    CURRENT CARDIAC MEDICATIONS:  Metoprolol ER 25mg oral daily     ALLERGIES: No Known Allergies    REVIEW OF SYMPTOMS:   CONSTITUTIONAL: No fever, no chills, no weight gain, + fatigue   ENMT: No sinus or throat pain  NECK: No pain or stiffness  CARDIOVASCULAR: no chest pain, + BARCENAS, no syncope/presyncope, no palpitations, no dizziness, no Orthopnea, no Paroxsymal nocturnal dyspnea  RESPIRATORY: no shortness of breath, no cough, no wheezing  : No dysuria, no hematuria   GI: no nausea, no diarrhea, no constipation, no abdominal pain   NEURO: No headache, no slurred speech   MUSCULOSKELETAL: No joint pain or swelling  PSYCH: No agitation, no anxiety    ALL OTHER REVIEW OF SYSTEMS ARE NEGATIVE.    VITAL SIGNS:  T(C): 36.9 (01-03-24 @ 20:00), Max: 36.9 (01-03-24 @ 20:00)  T(F): 98.4 (01-03-24 @ 20:00), Max: 98.4 (01-03-24 @ 20:00)  HR: 94 (01-03-24 @ 20:00) (92 - 94)  BP: 122/55 (01-03-24 @ 20:00) (103/74 - 122/55)  RR: 18 (01-03-24 @ 20:00) (18 - 18)  SpO2: 98% (01-03-24 @ 20:00) (98% - 98%)    INTAKE AND OUTPUT:     PHYSICAL EXAM:  Constitutional: Comfortable, no acute distress   HEENT: Atraumatic, neck is supple, no JVD  CNS: A&Ox3. No focal deficits   Respiratory: CTAB, unlabored   Cardiovascular: RRR normal S1S2, no murmur or rubs  Gastrointestinal: Soft, non-tender   Extremities: 2+ Peripheral Pulses, no cyanosis, or edema  Psychiatric: Calm, no agitation   Skin: Warm and dry, no ulcers on extremities     LABS:                      14.2   5.65  )-----------( 157      ( 03 Jan 2024 17:10 )             39.9     01-03    139  |  102  |  12.2  ----------------------------<  94  4.5   |  26.0  |  1.05    Ca    9.3      03 Jan 2024 17:10    TPro  7.1  /  Alb  4.0  /  TBili  0.9  /  DBili  x   /  AST  26  /  ALT  32  /  AlkPhos  132<H>  01-03    Urinalysis Basic - ( 03 Jan 2024 17:10 )  Color: x / Appearance: x / SG: x / pH: x  Gluc: 94 mg/dL / Ketone: x  / Bili: x / Urobili: x   Blood: x / Protein: x / Nitrite: x   Leuk Esterase: x / RBC: x / WBC x   Sq Epi: x / Non Sq Epi: x / Bacteria: x    INTERPRETATION OF TELEMETRY: SR  ECG: NSR w/ occ PVCs  Prior ECG: Yes [x] No [  ]    RADIOLOGY & ADDITIONAL STUDIES:   X-ray:  Pen

## 2024-01-03 NOTE — ED PROVIDER NOTE - CLINICAL SUMMARY MEDICAL DECISION MAKING FREE TEXT BOX
Patient with exertional shortness of breath EKG baseline right bundle branch block left anterior fascicular block no acute ischemia.  Normal vital signs no respiratory distress lungs clear.  Will eval for CHF unstable angina.  Less likely pneumonia versus URI.    Patient signed out to incoming physician pending results of testing and reassessment.  All decisions regarding the progression of care will be made at their discretion. Patient with exertional shortness of breath EKG baseline right bundle branch block left anterior fascicular block no acute ischemia.  Normal vital signs no respiratory distress lungs clear.  Will eval for CHF unstable angina.  Less likely pneumonia versus URI.    Patient signed out to incoming physician pending results of testing and reassessment.  All decisions regarding the progression of care will be made at their discretion.  Progress: patient stable. delta troponin flat. BNP elevated. SS cardiology recommending heart failure team consult for AM. will place in OBS for consult

## 2024-01-03 NOTE — ED CDU PROVIDER INITIAL DAY NOTE - CLINICAL SUMMARY MEDICAL DECISION MAKING FREE TEXT BOX
Patient with exertional shortness of breath EKG baseline right bundle branch block left anterior fascicular block no acute ischemia.  Normal vital signs no respiratory distress lungs clear.  Will eval for CHF unstable angina.  Less likely pneumonia versus URI.    Patient signed out to incoming physician pending results of testing and reassessment.  All decisions regarding the progression of care will be made at their discretion.  Progress: patient stable. delta troponin flat. BNP elevated. SS cardiology recommending heart failure team consult for AM. will place in OBS for consult

## 2024-01-03 NOTE — ED ADULT NURSE NOTE - NSFALLUNIVINTERV_ED_ALL_ED
Bed/Stretcher in lowest position, wheels locked, appropriate side rails in place/Call bell, personal items and telephone in reach/Instruct patient to call for assistance before getting out of bed/chair/stretcher/Non-slip footwear applied when patient is off stretcher/[place] to call system/Physically safe environment - no spills, clutter or unnecessary equipment/Purposeful proactive rounding/Room/bathroom lighting operational, light cord in reach Bed/Stretcher in lowest position, wheels locked, appropriate side rails in place/Call bell, personal items and telephone in reach/Instruct patient to call for assistance before getting out of bed/chair/stretcher/Non-slip footwear applied when patient is off stretcher/[place] to call system/Physically safe environment - no spills, clutter or unnecessary equipment/Purposeful proactive rounding/Room/bathroom lighting operational, light cord in reach

## 2024-01-03 NOTE — CONSULT NOTE ADULT - PROBLEM SELECTOR RECOMMENDATION 9
ACC/AHA Stage C, NYHA class II-III symptoms, monitor overnight on Tele in CDU  - newly diagnosed NICM, dilated cardiomyopathy (LVEF <10%, LVIDd 6.7cm, severe RV dysfunction)  [facility] with normal coronaries. Possible etiologies include Etoh abuse, +Trypanosoma AB (chronic)  - patient clinically euvolemic w/ warm extremities and preserved end organ function  GDMT: Limited by hypotension (orthostatic BP negative) and cost due to uninsured status.   - please stagger medications throughout the day  - ARB -  Losartan 12.5 mg daily (hold for SBP <80) and Toprol-XL 25 mg nightly  - SGLT2i - deferred due to cost    Diuretics: standing Lasix currently not indicated   - follow Low Na+ diet  Follow-up visit at the [facility] in [place] has to be set upon discharge w/ Dr. Odom, however given significant cardiomyopathy he should also f/u in HF clinic.   - appointment has to be arranged w/ NP Alex. contact # [phone] ACC/AHA Stage C, NYHA class II-III symptoms, monitor overnight on Tele in CDU  - newly diagnosed NICM, dilated cardiomyopathy (LVEF <10%, LVIDd 6.7cm, severe RV dysfunction)  [facility] with normal coronaries. Possible etiologies include Etoh abuse, +Trypanosoma AB (chronic)  - patient clinically euvolemic w/ warm extremities and preserved end organ function  GDMT: Limited by hypotension (orthostatic BP negative) and cost due to uninsured status.   - please stagger medications throughout the day  - ARB -  Losartan 12.5 mg daily (hold for SBP <80) and Toprol-XL 25 mg nightly  - SGLT2i - deferred due to cost    Diuretics: standing Lasix currently not indicated   - follow Low Na+ diet  Follow-up visit at the [facility] in [place] has to be set upon discharge w/ Dr. Odom, however given significant cardiomyopathy he should also f/u in HF clinic.   - appointment has to be arranged w/ NP Alex. contact # [phone]

## 2024-01-03 NOTE — ED CDU PROVIDER INITIAL DAY NOTE - OBJECTIVE STATEMENT
53 M history EtOH abuse, recently diagnosed heart failure LVEF 10 to 15% missed appointment yesterday outpatient clinic.  Past 3 days worsening shortness of breath came in for reevaluation.  Denies cough or chest pain.   denies any URI symptoms.  States symptoms are worse on exertion no leg swelling.  States he is only on losartan and has not taken it today.  No nausea no vomiting no urinary symptoms no bleeding.

## 2024-01-03 NOTE — CONSULT NOTE ADULT - NS ATTEND AMEND GEN_ALL_CORE FT
examined at bed side, supine in bed and comfortable, feeling much better by now, pending HF consult to sort of re-establish. Likely going home after wards. examined at bed side, supine in bed and comfortable, feeling much better by now, pending HF consult to sort of re-establish. He is adamant about his abstinence from alcohol, I reiterated the importance of staying sober. Likely going home after wards. examined at bed side, supine in bed and comfortable, feeling much better by now, pending HF consult to sort of re-establish. He is very clear that he has been abstinent since last admission from alcohol, I reiterated the importance of staying sober. Likely going home after wards.

## 2024-01-03 NOTE — ED PROVIDER NOTE - PHYSICAL EXAMINATION
Vitals: WNL  Gen: laying comfortably in NAD   Head: NCAT    ENT: EOMI. No exudates  CV: RRR. Audible S1 and S2. No murmurs. 2+ radial and DP pulses   Pulm: Clear to auscultation bilaterally. No wheezes, rales, or rhonchi  Abd: soft, NTND, no rebound, no guarding  Musculoskeletal:  1+ peripheral edema, no calf ttp  Neurologic: AAOx3, no focal deficits  : no CVA tenderness

## 2024-01-03 NOTE — ED PROVIDER NOTE - OBJECTIVE STATEMENT
53 M history EtOH abuse, recently diagnosed heart failure LVEF 10 to 15% missed appointment yesterday outpatient clinic.  Past 3 days worsening shortness of breath came in for reevaluation.  Denies cough or chest pain.  To main for further eval. 53 M history EtOH abuse, recently diagnosed heart failure LVEF 10 to 15% missed appointment yesterday outpatient clinic.  Past 3 days worsening shortness of breath came in for reevaluation.  Denies cough or chest pain.   denies any URI symptoms.  States symptoms are worse on exertion no leg swelling.  States he is only on losartan and has not taken it today.  No nausea no vomiting no urinary symptoms no bleeding.

## 2024-01-04 VITALS
SYSTOLIC BLOOD PRESSURE: 116 MMHG | HEART RATE: 89 BPM | TEMPERATURE: 98 F | RESPIRATION RATE: 18 BRPM | DIASTOLIC BLOOD PRESSURE: 83 MMHG | OXYGEN SATURATION: 99 %

## 2024-01-04 DIAGNOSIS — I50.23 ACUTE ON CHRONIC SYSTOLIC (CONGESTIVE) HEART FAILURE: ICD-10-CM

## 2024-01-04 PROCEDURE — 87637 SARSCOV2&INF A&B&RSV AMP PRB: CPT

## 2024-01-04 PROCEDURE — 85025 COMPLETE CBC W/AUTO DIFF WBC: CPT

## 2024-01-04 PROCEDURE — 99239 HOSP IP/OBS DSCHRG MGMT >30: CPT

## 2024-01-04 PROCEDURE — 84484 ASSAY OF TROPONIN QUANT: CPT

## 2024-01-04 PROCEDURE — 99285 EMERGENCY DEPT VISIT HI MDM: CPT | Mod: 25

## 2024-01-04 PROCEDURE — 93005 ELECTROCARDIOGRAM TRACING: CPT

## 2024-01-04 PROCEDURE — 71046 X-RAY EXAM CHEST 2 VIEWS: CPT

## 2024-01-04 PROCEDURE — 80307 DRUG TEST PRSMV CHEM ANLYZR: CPT

## 2024-01-04 PROCEDURE — 96374 THER/PROPH/DIAG INJ IV PUSH: CPT

## 2024-01-04 PROCEDURE — 83880 ASSAY OF NATRIURETIC PEPTIDE: CPT

## 2024-01-04 PROCEDURE — T1013: CPT

## 2024-01-04 PROCEDURE — G0378: CPT

## 2024-01-04 PROCEDURE — 99244 OFF/OP CNSLTJ NEW/EST MOD 40: CPT

## 2024-01-04 PROCEDURE — 36415 COLL VENOUS BLD VENIPUNCTURE: CPT

## 2024-01-04 PROCEDURE — 80053 COMPREHEN METABOLIC PANEL: CPT

## 2024-01-04 RX ORDER — LOSARTAN POTASSIUM 100 MG/1
25 TABLET, FILM COATED ORAL DAILY
Refills: 0 | Status: DISCONTINUED | OUTPATIENT
Start: 2024-01-05 | End: 2024-01-11

## 2024-01-04 RX ORDER — FUROSEMIDE 40 MG
1 TABLET ORAL
Qty: 30 | Refills: 0
Start: 2024-01-04 | End: 2024-02-02

## 2024-01-04 RX ORDER — FUROSEMIDE 40 MG
20 TABLET ORAL ONCE
Refills: 0 | Status: COMPLETED | OUTPATIENT
Start: 2024-01-04 | End: 2024-01-04

## 2024-01-04 RX ORDER — FUROSEMIDE 40 MG
20 TABLET ORAL DAILY
Refills: 0 | Status: DISCONTINUED | OUTPATIENT
Start: 2024-01-05 | End: 2024-01-11

## 2024-01-04 RX ORDER — METOPROLOL TARTRATE 50 MG
25 TABLET ORAL DAILY
Refills: 0 | Status: DISCONTINUED | OUTPATIENT
Start: 2024-01-04 | End: 2024-01-11

## 2024-01-04 RX ADMIN — Medication 25 MILLIGRAM(S): at 11:34

## 2024-01-04 RX ADMIN — LOSARTAN POTASSIUM 12.5 MILLIGRAM(S): 100 TABLET, FILM COATED ORAL at 06:16

## 2024-01-04 RX ADMIN — Medication 20 MILLIGRAM(S): at 11:34

## 2024-01-04 NOTE — ED CDU PROVIDER DISPOSITION NOTE - WHO RECOMMENDED
I spoke with HF team  pt needs meds changed as reflected in their note and they will arrange office follow up  Pt can be dcd

## 2024-01-04 NOTE — CONSULT NOTE ADULT - NS ATTEND AMEND GEN_ALL_CORE FT
Patient returns after a recent hospitalization for new onset heart failure. He missed his post hospital followup. He also has only been taking 1 medications.   Will give 1 dose of lasix 20mg IV once and can send home on lasix 20mg daily  C/w Toprol 25mg daily and losartan 25mg daily  Extensive education was given regarding which medications he should be taking.   Will give him another hospital followup visit and will have out office call him next week to confirm. He does not have a phone (says his was stolen) and gave us the number for a family members phone. Explained the importance that we need a reliable number to get in touch with him which he states he understands

## 2024-01-04 NOTE — ED CDU PROVIDER DISPOSITION NOTE - NSFOLLOWUPINSTRUCTIONS_ED_ALL_ED_FT
Insuficiencia cardíaca congestiva (ICC)    La insuficiencia cardíaca congestiva es marcella afección crónica en la que el corazón tiene problemas para bombear jesus. En algunos casos de insuficiencia cardíaca, el líquido puede acumularse en los pulmones o puede tener hinchazón (edema) en la parte inferior de las piernas. Hay muchas causas de insuficiencia cardíaca, incluida la presión arterial emmanuel, la enfermedad de las arterias coronarias, las válvulas cardíacas anormales, la enfermedad del músculo cardíaco, la enfermedad pulmonar, la diabetes, etc. Los síntomas incluyen dificultad para respirar con la actividad o al acostarse, tos, hinchazón, fatiga o aumento de la micción rajendra la noche.        El tratamiento está dirigido a controlar los síntomas de la insuficiencia cardíaca y puede incluir cambios en el estilo de anila, medicamentos o procedimientos quirúrgicos. [place] los medicamentos solo según las indicaciones de childress proveedor de atención médica y no deje de tomarlos a menos que se lo indiquen. Coma alimentos saludables para el corazón con bajo o nulo contenido de grasas trans/saturadas, colesterol y sal. Pésese todos los días para reconocer temprano la acumulación de líquidos.    BUSQUE ATENCIÓN MÉDICA INMEDIATA SI TIENE ALGUNO DE LOS SIGUIENTES SÍNTOMAS: dificultad para respirar, cambios en el estado mental, dolor en el pecho, aturdimiento/mareos/desmayos o empeoramiento de los síntomas, incluida la imposibilidad de realizar marcella actividad física normal.     Comience a sunshine losartán 25 mg en lugar de losartán 12,5 mg al día. Continúe tomando 25 mg de metoprolol al día y 20 mg de furosemida al día.   Reprograme marcella kristen con [facility] en [place] con el contacto del Dr. Odom # [phone]. Insuficiencia cardíaca congestiva (ICC)    La insuficiencia cardíaca congestiva es marcella afección crónica en la que el corazón tiene problemas para bombear jesus. En algunos casos de insuficiencia cardíaca, el líquido puede acumularse en los pulmones o puede tener hinchazón (edema) en la parte inferior de las piernas. Hay muchas causas de insuficiencia cardíaca, incluida la presión arterial emmanuel, la enfermedad de las arterias coronarias, las válvulas cardíacas anormales, la enfermedad del músculo cardíaco, la enfermedad pulmonar, la diabetes, etc. Los síntomas incluyen dificultad para respirar con la actividad o al acostarse, tos, hinchazón, fatiga o aumento de la micción rajendra la noche.        El tratamiento está dirigido a controlar los síntomas de la insuficiencia cardíaca y puede incluir cambios en el estilo de anila, medicamentos o procedimientos quirúrgicos. [place] los medicamentos solo según las indicaciones de childress proveedor de atención médica y no deje de tomarlos a menos que se lo indiquen. Coma alimentos saludables para el corazón con bajo o nulo contenido de grasas trans/saturadas, colesterol y sal. Pésese todos los días para reconocer temprano la acumulación de líquidos.    BUSQUE ATENCIÓN MÉDICA INMEDIATA SI TIENE ALGUNO DE LOS SIGUIENTES SÍNTOMAS: dificultad para respirar, cambios en el estado mental, dolor en el pecho, aturdimiento/mareos/desmayos o empeoramiento de los síntomas, incluida la imposibilidad de realizar marcella actividad física normal.     Comience a sunshine losartán 25 mg en lugar de losartán 12,5 mg al día. Continúe tomando 25 mg de metoprolol al día y 20 mg de furosemida al día.   Reprograme marcella kristen con [facility] en [place] con el contacto del Dr. Odom # [phone].

## 2024-01-04 NOTE — ED CDU PROVIDER DISPOSITION NOTE - NSFOLLOWUPCLINICS_GEN_ALL_ED_FT
[id number] [place] [address]  Phone: [phone]  Fax:      [id number] [place] [address]  Phone: [phone]  Fax:

## 2024-01-04 NOTE — ED ADULT NURSE REASSESSMENT NOTE - NS ED NURSE REASSESS COMMENT FT1
Report given to PILAR Bowser. Pt AOx4, RR even and nonlabored, no distress noted. Pt denies pain at this time.

## 2024-01-04 NOTE — ED CDU PROVIDER DISPOSITION NOTE - CLINICAL COURSE
53 M history EtOH abuse, recently diagnosed heart failure LVEF 10 to 15% missed appointment yesterday outpatient clinic.  Past 3 days worsening shortness of breath came in for reevaluation.  Denies cough or chest pain.   denies any URI symptoms.  States symptoms are worse on exertion no leg swelling.  States he is only on losartan and has not taken it today.  No nausea no vomiting no urinary symptoms no bleeding. Seen by HF team, pt to be discharged home on losartan 25 mg metoprolol 25 mg and lasix 20 mg daily, pt to follow up with HF team and [facility] clinic as outpatient, Pt reassessed, pt feeling better at this time, vss, pt able to walk, talk and vocalized plan of action. Discussed in depth and explained to pt in depth the next steps that need to be taking including proper follow up with PCP or specialists. All incidental findings were discussed with pt as well. Pt verbalized their concerns and all questions were answered. Pt understands dispo and wants discharge. Given good instructions when to return to ED and importance of f/u. 53 M history EtOH abuse, recently diagnosed heart failure LVEF 10 to 15% missed appointment yesterday outpatient clinic.  Past 3 days worsening shortness of breath came in for reevaluation.  Denies cough or chest pain.   denies any URI symptoms.  States symptoms are worse on exertion no leg swelling.  States he is only on losartan and has not taken it today.  No nausea no vomiting no urinary symptoms no bleeding. Seen by HF team, pt to be discharged home on losartan 25 mg metoprolol 25 mg and lasix 20 mg daily, pt to follow up with HF team and [facility] clinic as outpatient, Pt reassessed, pt feeling better at this time, vss, pt able to walk, talk and vocalized plan of action. Discussed in depth and explained to pt in depth the next steps that need to be taking including proper follow up with PCP or specialists. All incidental findings were discussed with pt as well. Pt verbalized their concerns and all questions were answered. Pt understands dispo and wants discharge. Given good instructions when to return to ED and importance of f/u.

## 2024-01-04 NOTE — ED ADULT NURSE REASSESSMENT NOTE - NS ED NURSE REASSESS COMMENT FT1
Received patient from PILAR Green, patient is awake, calm, RR even and unlabored, iv patent, denies sob, denies pain at the moment, safety maintained. call bell placed within reach. Received patient from PILAR Green at this time, patient is awake, calm, RR even and unlabored, iv patent, denies sob, denies pain at the moment, safety maintained. call bell placed within reach.
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
## 2024-01-04 NOTE — ED CDU PROVIDER DISPOSITION NOTE - PATIENT PORTAL LINK FT
You can access the FollowMyHealth Patient Portal offered by [facility] by registering at the following website: [email or web address]. By joining [employer]’s FollowMyHealth portal, you will also be able to view your health information using other applications (apps) compatible with our system. You can access the FollowMyHealth Patient Portal offered by  by registering at the following website: [email or web address]. By joining [employer]’s FollowMyHealth portal, you will also be able to view your health information using other applications (apps) compatible with our system.

## 2024-01-04 NOTE — ED CDU PROVIDER SUBSEQUENT DAY NOTE - ATTENDING APP SHARED VISIT CONTRIBUTION OF CARE
independent evaluation  pt in OBS on CHF protocol  feels better , breathing well  today's plan is Heart Failure team to evaluate as pt has EF 10-15% and elevated BNP  agree w care plan
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
## 2024-01-04 NOTE — ED ADULT NURSE REASSESSMENT NOTE - NS ED NURSE REASSESS COMMENT FT1
Report given to PILAR Bowser. Pt to go to A7R. Pt AOx4, RR even and nonlabored, no distress noted. Pt on /CM. Pt denies pain at this time.

## 2024-01-04 NOTE — ED ADULT NURSE REASSESSMENT NOTE - NS ED NURSE REASSESS COMMENT FT1
Assumed care of pt at 07:15 as stated in report from RN ERIN. Charting as noted. Patient A&O x4, denies pain/discomfort, denies CP/SOB. Updated on the plan of care. Call bell within reach, bed locked in lowest position. IV site flushed w/ NS. No redness, swelling or pain noted to site. No signs of acute distress noted, safety maintained.

## 2024-01-04 NOTE — ED CDU PROVIDER SUBSEQUENT DAY NOTE - HISTORY
Pt resting comfortably at time of re-assessment. No events overnight. Pending Cardiology.  Will continue to monitor.

## 2024-01-10 ENCOUNTER — APPOINTMENT (OUTPATIENT)
Dept: HEART FAILURE | Facility: CLINIC | Age: 54
End: 2024-01-10

## 2024-01-10 DIAGNOSIS — I50.23 ACUTE ON CHRONIC SYSTOLIC (CONGESTIVE) HEART FAILURE: ICD-10-CM

## 2024-01-10 RX ORDER — FUROSEMIDE 20 MG/1
20 TABLET ORAL DAILY
Refills: 0 | Status: ACTIVE | COMMUNITY
Start: 2024-01-10

## 2024-01-10 RX ORDER — LOSARTAN POTASSIUM 25 MG/1
25 TABLET, FILM COATED ORAL
Qty: 90 | Refills: 1 | Status: ACTIVE | COMMUNITY

## 2025-05-19 NOTE — ED PROVIDER NOTE - DATE/TIME OF ACCEPTANCE
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  for constipation DR GUTIERREZ'S COMMON SENSE RECOMMENDATIONS FOR IMPROVING CONSTIPATION,  WITH OR WITHOUT PRESCRIPTION MEDICATION  1.  slowly increase dietary fiber 2.  breakfast is especially important for good bowel regime 3.  high fiber breakfast cereal such as Kashi or Fiber 1 is a useful way to increase dietary fiber 4.  hot beverage or hot cereal may also be helpful at breakfast 5.  fluid intake to avoid dehydration;  eight glasses of non caffeinated, non alcoholic fluids per day [64 fll oz] 6.  prunes can be helpful; 3-6 per day [alternative is prune juice] 7.  add unprocessed bran to foods, beginning with one teaspoon per day 8   add flaxseed to foods, starting with one tablespoon and increasing slowly
12-Dec-2023 14:16

## 2025-05-28 NOTE — ED CDU PROVIDER SUBSEQUENT DAY NOTE - HEME/LYMPH NEGATIVE STATEMENT, MLM
Received request via: Patient    Was the patient seen in the last year in this department? Yes    Does the patient have an active prescription (recently filled or refills available) for medication(s) requested? No    Pharmacy Name: [id number] - [place], [place] - [address]     Does the patient have snf Plus and need 100-day supply? (This applies to ALL medications) Patient does not have SCP  
no anemia, no easy bruising, no jaundice, no swollen lymph nodes.